# Patient Record
Sex: FEMALE | Race: WHITE | NOT HISPANIC OR LATINO | Employment: OTHER | URBAN - NONMETROPOLITAN AREA
[De-identification: names, ages, dates, MRNs, and addresses within clinical notes are randomized per-mention and may not be internally consistent; named-entity substitution may affect disease eponyms.]

---

## 2017-11-18 ENCOUNTER — HOSPITAL ENCOUNTER (INPATIENT)
Facility: HOSPITAL | Age: 62
LOS: 6 days | Discharge: NURSING, REHAB OR LONG-TERM CARE | End: 2017-11-24
Attending: EMERGENCY MEDICINE | Admitting: FAMILY MEDICINE

## 2017-11-18 ENCOUNTER — APPOINTMENT (OUTPATIENT)
Dept: GENERAL RADIOLOGY | Facility: HOSPITAL | Age: 62
End: 2017-11-18

## 2017-11-18 ENCOUNTER — ANESTHESIA (OUTPATIENT)
Dept: PERIOP | Facility: HOSPITAL | Age: 62
End: 2017-11-18

## 2017-11-18 ENCOUNTER — ANESTHESIA EVENT (OUTPATIENT)
Dept: PERIOP | Facility: HOSPITAL | Age: 62
End: 2017-11-18

## 2017-11-18 DIAGNOSIS — S72.321A CLOSED DISPLACED TRANSVERSE FRACTURE OF SHAFT OF RIGHT FEMUR, INITIAL ENCOUNTER (HCC): Primary | ICD-10-CM

## 2017-11-18 PROBLEM — S72.91XA FEMUR FRACTURE, RIGHT (HCC): Status: ACTIVE | Noted: 2017-11-18

## 2017-11-18 LAB
ALBUMIN SERPL-MCNC: 4.4 G/DL (ref 3.4–4.8)
ALBUMIN/GLOB SERPL: 1.8 G/DL (ref 1.5–2.5)
ALP SERPL-CCNC: 77 U/L (ref 35–104)
ALT SERPL W P-5'-P-CCNC: 17 U/L (ref 10–36)
ANION GAP SERPL CALCULATED.3IONS-SCNC: 6.1 MMOL/L (ref 3.6–11.2)
APTT PPP: 26.8 SECONDS (ref 23.8–36.1)
AST SERPL-CCNC: 25 U/L (ref 10–30)
BASOPHILS # BLD AUTO: 0.02 10*3/MM3 (ref 0–0.3)
BASOPHILS NFR BLD AUTO: 0.2 % (ref 0–2)
BILIRUB SERPL-MCNC: 0.5 MG/DL (ref 0.2–1.8)
BUN BLD-MCNC: 19 MG/DL (ref 7–21)
BUN/CREAT SERPL: 23.8 (ref 7–25)
CALCIUM SPEC-SCNC: 9.4 MG/DL (ref 7.7–10)
CHLORIDE SERPL-SCNC: 107 MMOL/L (ref 99–112)
CO2 SERPL-SCNC: 23.9 MMOL/L (ref 24.3–31.9)
CREAT BLD-MCNC: 0.8 MG/DL (ref 0.43–1.29)
DEPRECATED RDW RBC AUTO: 43.7 FL (ref 37–54)
EOSINOPHIL # BLD AUTO: 0.04 10*3/MM3 (ref 0–0.7)
EOSINOPHIL NFR BLD AUTO: 0.4 % (ref 0–5)
ERYTHROCYTE [DISTWIDTH] IN BLOOD BY AUTOMATED COUNT: 13.3 % (ref 11.5–14.5)
GFR SERPL CREATININE-BSD FRML MDRD: 72 ML/MIN/1.73
GLOBULIN UR ELPH-MCNC: 2.5 GM/DL
GLUCOSE BLD-MCNC: 114 MG/DL (ref 70–110)
HCT VFR BLD AUTO: 42.3 % (ref 37–47)
HGB BLD-MCNC: 13.7 G/DL (ref 12–16)
IMM GRANULOCYTES # BLD: 0.04 10*3/MM3 (ref 0–0.03)
IMM GRANULOCYTES NFR BLD: 0.4 % (ref 0–0.5)
INR PPP: 0.95 (ref 0.9–1.1)
LYMPHOCYTES # BLD AUTO: 1.19 10*3/MM3 (ref 1–3)
LYMPHOCYTES NFR BLD AUTO: 10.7 % (ref 21–51)
MCH RBC QN AUTO: 28.9 PG (ref 27–33)
MCHC RBC AUTO-ENTMCNC: 32.4 G/DL (ref 33–37)
MCV RBC AUTO: 89.2 FL (ref 80–94)
MONOCYTES # BLD AUTO: 0.46 10*3/MM3 (ref 0.1–0.9)
MONOCYTES NFR BLD AUTO: 4.1 % (ref 0–10)
NEUTROPHILS # BLD AUTO: 9.35 10*3/MM3 (ref 1.4–6.5)
NEUTROPHILS NFR BLD AUTO: 84.2 % (ref 30–70)
OSMOLALITY SERPL CALC.SUM OF ELEC: 276.9 MOSM/KG (ref 273–305)
PLATELET # BLD AUTO: 257 10*3/MM3 (ref 130–400)
PMV BLD AUTO: 11.3 FL (ref 6–10)
POTASSIUM BLD-SCNC: 4 MMOL/L (ref 3.5–5.3)
PROT SERPL-MCNC: 6.9 G/DL (ref 6–8)
PROTHROMBIN TIME: 12.8 SECONDS (ref 11–15.4)
RBC # BLD AUTO: 4.74 10*6/MM3 (ref 4.2–5.4)
SODIUM BLD-SCNC: 137 MMOL/L (ref 135–153)
WBC NRBC COR # BLD: 11.1 10*3/MM3 (ref 4.5–12.5)

## 2017-11-18 PROCEDURE — 71010 XR CHEST 1 VW: CPT | Performed by: RADIOLOGY

## 2017-11-18 PROCEDURE — 25010000002 ONDANSETRON PER 1 MG: Performed by: NURSE ANESTHETIST, CERTIFIED REGISTERED

## 2017-11-18 PROCEDURE — 85025 COMPLETE CBC W/AUTO DIFF WBC: CPT | Performed by: PHYSICIAN ASSISTANT

## 2017-11-18 PROCEDURE — C1713 ANCHOR/SCREW BN/BN,TIS/BN: HCPCS | Performed by: ORTHOPAEDIC SURGERY

## 2017-11-18 PROCEDURE — 25010000002 MIDAZOLAM PER 1 MG: Performed by: NURSE ANESTHETIST, CERTIFIED REGISTERED

## 2017-11-18 PROCEDURE — 25010000002 HYDROMORPHONE PER 4 MG: Performed by: EMERGENCY MEDICINE

## 2017-11-18 PROCEDURE — 0QS834Z REPOSITION RIGHT FEMORAL SHAFT WITH INTERNAL FIXATION DEVICE, PERCUTANEOUS APPROACH: ICD-10-PCS | Performed by: ORTHOPAEDIC SURGERY

## 2017-11-18 PROCEDURE — 93005 ELECTROCARDIOGRAM TRACING: CPT | Performed by: PHYSICIAN ASSISTANT

## 2017-11-18 PROCEDURE — 73552 X-RAY EXAM OF FEMUR 2/>: CPT

## 2017-11-18 PROCEDURE — 25010000002 HYDROMORPHONE PER 4 MG: Performed by: FAMILY MEDICINE

## 2017-11-18 PROCEDURE — 73502 X-RAY EXAM HIP UNI 2-3 VIEWS: CPT

## 2017-11-18 PROCEDURE — 76000 FLUOROSCOPY <1 HR PHYS/QHP: CPT | Performed by: RADIOLOGY

## 2017-11-18 PROCEDURE — 76000 FLUOROSCOPY <1 HR PHYS/QHP: CPT

## 2017-11-18 PROCEDURE — 99284 EMERGENCY DEPT VISIT MOD MDM: CPT

## 2017-11-18 PROCEDURE — 25010000002 HYDROMORPHONE PER 4 MG

## 2017-11-18 PROCEDURE — 25010000002 PROPOFOL 10 MG/ML EMULSION: Performed by: NURSE ANESTHETIST, CERTIFIED REGISTERED

## 2017-11-18 PROCEDURE — 87040 BLOOD CULTURE FOR BACTERIA: CPT | Performed by: ORTHOPAEDIC SURGERY

## 2017-11-18 PROCEDURE — 71010 HC CHEST PA OR AP: CPT

## 2017-11-18 PROCEDURE — 25010000002 FENTANYL CITRATE (PF) 100 MCG/2ML SOLUTION: Performed by: NURSE ANESTHETIST, CERTIFIED REGISTERED

## 2017-11-18 PROCEDURE — 73552 X-RAY EXAM OF FEMUR 2/>: CPT | Performed by: RADIOLOGY

## 2017-11-18 PROCEDURE — 85730 THROMBOPLASTIN TIME PARTIAL: CPT | Performed by: FAMILY MEDICINE

## 2017-11-18 PROCEDURE — 25010000003 CEFAZOLIN PER 500 MG: Performed by: ORTHOPAEDIC SURGERY

## 2017-11-18 PROCEDURE — 73502 X-RAY EXAM HIP UNI 2-3 VIEWS: CPT | Performed by: RADIOLOGY

## 2017-11-18 PROCEDURE — 80053 COMPREHEN METABOLIC PANEL: CPT | Performed by: PHYSICIAN ASSISTANT

## 2017-11-18 PROCEDURE — 85610 PROTHROMBIN TIME: CPT | Performed by: FAMILY MEDICINE

## 2017-11-18 PROCEDURE — 25010000003 CEFAZOLIN PER 500 MG: Performed by: NURSE ANESTHETIST, CERTIFIED REGISTERED

## 2017-11-18 PROCEDURE — 25010000002 DEXAMETHASONE PER 1 MG: Performed by: NURSE ANESTHETIST, CERTIFIED REGISTERED

## 2017-11-18 DEVICE — SCRW LK STRDRV TI 5X38M STRL: Type: IMPLANTABLE DEVICE | Status: FUNCTIONAL

## 2017-11-18 DEVICE — SCRW LK STRDRV TI 5X48MM STRL: Type: IMPLANTABLE DEVICE | Status: FUNCTIONAL

## 2017-11-18 DEVICE — BLD FEM FIX HELI TFN ADV 85MM STRL: Type: IMPLANTABLE DEVICE | Status: FUNCTIONAL

## 2017-11-18 DEVICE — IMPLANTABLE DEVICE: Type: IMPLANTABLE DEVICE | Status: FUNCTIONAL

## 2017-11-18 RX ORDER — OXYCODONE HYDROCHLORIDE AND ACETAMINOPHEN 5; 325 MG/1; MG/1
1 TABLET ORAL ONCE AS NEEDED
Status: DISCONTINUED | OUTPATIENT
Start: 2017-11-18 | End: 2017-11-19

## 2017-11-18 RX ORDER — SODIUM CHLORIDE 9 MG/ML
50 INJECTION, SOLUTION INTRAVENOUS CONTINUOUS
Status: DISCONTINUED | OUTPATIENT
Start: 2017-11-18 | End: 2017-11-24 | Stop reason: HOSPADM

## 2017-11-18 RX ORDER — HYDROMORPHONE HYDROCHLORIDE 1 MG/ML
0.5 INJECTION, SOLUTION INTRAMUSCULAR; INTRAVENOUS; SUBCUTANEOUS
Status: DISCONTINUED | OUTPATIENT
Start: 2017-11-18 | End: 2017-11-24 | Stop reason: HOSPADM

## 2017-11-18 RX ORDER — MEPERIDINE HYDROCHLORIDE 25 MG/ML
12.5 INJECTION INTRAMUSCULAR; INTRAVENOUS; SUBCUTANEOUS
Status: DISCONTINUED | OUTPATIENT
Start: 2017-11-18 | End: 2017-11-18 | Stop reason: HOSPADM

## 2017-11-18 RX ORDER — HYDROMORPHONE HCL 110MG/55ML
PATIENT CONTROLLED ANALGESIA SYRINGE INTRAVENOUS
Status: COMPLETED
Start: 2017-11-18 | End: 2017-11-18

## 2017-11-18 RX ORDER — NAPROXEN 250 MG/1
250 TABLET ORAL DAILY
Status: DISCONTINUED | OUTPATIENT
Start: 2017-11-19 | End: 2017-11-24 | Stop reason: HOSPADM

## 2017-11-18 RX ORDER — FENTANYL CITRATE 50 UG/ML
INJECTION, SOLUTION INTRAMUSCULAR; INTRAVENOUS AS NEEDED
Status: DISCONTINUED | OUTPATIENT
Start: 2017-11-18 | End: 2017-11-18 | Stop reason: SURG

## 2017-11-18 RX ORDER — LIDOCAINE HYDROCHLORIDE 20 MG/ML
INJECTION, SOLUTION EPIDURAL; INFILTRATION; INTRACAUDAL; PERINEURAL AS NEEDED
Status: DISCONTINUED | OUTPATIENT
Start: 2017-11-18 | End: 2017-11-18 | Stop reason: SURG

## 2017-11-18 RX ORDER — IPRATROPIUM BROMIDE AND ALBUTEROL SULFATE 2.5; .5 MG/3ML; MG/3ML
3 SOLUTION RESPIRATORY (INHALATION) ONCE AS NEEDED
Status: DISCONTINUED | OUTPATIENT
Start: 2017-11-18 | End: 2017-11-18 | Stop reason: HOSPADM

## 2017-11-18 RX ORDER — LISINOPRIL 10 MG/1
20 TABLET ORAL DAILY
Status: DISCONTINUED | OUTPATIENT
Start: 2017-11-19 | End: 2017-11-24 | Stop reason: HOSPADM

## 2017-11-18 RX ORDER — LISINOPRIL 20 MG/1
20 TABLET ORAL DAILY
COMMUNITY
End: 2017-11-30 | Stop reason: HOSPADM

## 2017-11-18 RX ORDER — SODIUM CHLORIDE, SODIUM LACTATE, POTASSIUM CHLORIDE, CALCIUM CHLORIDE 600; 310; 30; 20 MG/100ML; MG/100ML; MG/100ML; MG/100ML
125 INJECTION, SOLUTION INTRAVENOUS CONTINUOUS
Status: DISCONTINUED | OUTPATIENT
Start: 2017-11-18 | End: 2017-11-24 | Stop reason: HOSPADM

## 2017-11-18 RX ORDER — NAPROXEN SODIUM 220 MG
220 TABLET ORAL DAILY
COMMUNITY
End: 2022-01-01

## 2017-11-18 RX ORDER — LEVOTHYROXINE SODIUM 0.03 MG/1
25 TABLET ORAL DAILY
COMMUNITY

## 2017-11-18 RX ORDER — PROPOFOL 10 MG/ML
VIAL (ML) INTRAVENOUS AS NEEDED
Status: DISCONTINUED | OUTPATIENT
Start: 2017-11-18 | End: 2017-11-18 | Stop reason: SURG

## 2017-11-18 RX ORDER — ONDANSETRON 2 MG/ML
4 INJECTION INTRAMUSCULAR; INTRAVENOUS ONCE AS NEEDED
Status: DISCONTINUED | OUTPATIENT
Start: 2017-11-18 | End: 2017-11-18 | Stop reason: HOSPADM

## 2017-11-18 RX ORDER — MIDAZOLAM HYDROCHLORIDE 1 MG/ML
INJECTION INTRAMUSCULAR; INTRAVENOUS AS NEEDED
Status: DISCONTINUED | OUTPATIENT
Start: 2017-11-18 | End: 2017-11-18 | Stop reason: SURG

## 2017-11-18 RX ORDER — SODIUM CHLORIDE 9 MG/ML
125 INJECTION, SOLUTION INTRAVENOUS CONTINUOUS
Status: DISCONTINUED | OUTPATIENT
Start: 2017-11-18 | End: 2017-11-24 | Stop reason: HOSPADM

## 2017-11-18 RX ORDER — ACETAMINOPHEN 325 MG/1
325 TABLET ORAL EVERY 4 HOURS PRN
Status: DISCONTINUED | OUTPATIENT
Start: 2017-11-18 | End: 2017-11-24 | Stop reason: HOSPADM

## 2017-11-18 RX ORDER — SODIUM CHLORIDE 0.9 % (FLUSH) 0.9 %
1-10 SYRINGE (ML) INJECTION AS NEEDED
Status: DISCONTINUED | OUTPATIENT
Start: 2017-11-18 | End: 2017-11-18 | Stop reason: HOSPADM

## 2017-11-18 RX ORDER — DEXAMETHASONE SODIUM PHOSPHATE 4 MG/ML
INJECTION, SOLUTION INTRA-ARTICULAR; INTRALESIONAL; INTRAMUSCULAR; INTRAVENOUS; SOFT TISSUE AS NEEDED
Status: DISCONTINUED | OUTPATIENT
Start: 2017-11-18 | End: 2017-11-18 | Stop reason: SURG

## 2017-11-18 RX ORDER — LEVOTHYROXINE SODIUM 0.03 MG/1
25 TABLET ORAL DAILY
Status: DISCONTINUED | OUTPATIENT
Start: 2017-11-19 | End: 2017-11-24 | Stop reason: HOSPADM

## 2017-11-18 RX ORDER — FENTANYL CITRATE 50 UG/ML
50 INJECTION, SOLUTION INTRAMUSCULAR; INTRAVENOUS
Status: DISCONTINUED | OUTPATIENT
Start: 2017-11-18 | End: 2017-11-18 | Stop reason: HOSPADM

## 2017-11-18 RX ORDER — ACETAMINOPHEN 325 MG/1
650 TABLET ORAL EVERY 4 HOURS PRN
Status: DISCONTINUED | OUTPATIENT
Start: 2017-11-18 | End: 2017-11-24 | Stop reason: HOSPADM

## 2017-11-18 RX ORDER — ONDANSETRON 2 MG/ML
INJECTION INTRAMUSCULAR; INTRAVENOUS AS NEEDED
Status: DISCONTINUED | OUTPATIENT
Start: 2017-11-18 | End: 2017-11-18 | Stop reason: SURG

## 2017-11-18 RX ADMIN — ONDANSETRON 4 MG: 2 INJECTION, SOLUTION INTRAMUSCULAR; INTRAVENOUS at 16:24

## 2017-11-18 RX ADMIN — SODIUM CHLORIDE 50 ML/HR: 9 INJECTION, SOLUTION INTRAVENOUS at 14:14

## 2017-11-18 RX ADMIN — FENTANYL CITRATE 50 MCG: 50 INJECTION INTRAMUSCULAR; INTRAVENOUS at 15:20

## 2017-11-18 RX ADMIN — HYDROMORPHONE HYDROCHLORIDE 0.5 MG: 1 INJECTION, SOLUTION INTRAMUSCULAR; INTRAVENOUS; SUBCUTANEOUS at 14:14

## 2017-11-18 RX ADMIN — SODIUM CHLORIDE 125 ML/HR: 9 INJECTION, SOLUTION INTRAVENOUS at 11:38

## 2017-11-18 RX ADMIN — HYDROMORPHONE HYDROCHLORIDE 0.5 MG: 1 INJECTION, SOLUTION INTRAMUSCULAR; INTRAVENOUS; SUBCUTANEOUS at 23:04

## 2017-11-18 RX ADMIN — DEXAMETHASONE SODIUM PHOSPHATE 4 MG: 4 INJECTION, SOLUTION INTRAMUSCULAR; INTRAVENOUS at 16:24

## 2017-11-18 RX ADMIN — CEFAZOLIN SODIUM 2 G: 2 SOLUTION INTRAVENOUS at 14:57

## 2017-11-18 RX ADMIN — FENTANYL CITRATE 50 MCG: 50 INJECTION INTRAMUSCULAR; INTRAVENOUS at 16:54

## 2017-11-18 RX ADMIN — HYDROMORPHONE HYDROCHLORIDE 1 MG: 2 INJECTION INTRAMUSCULAR; INTRAVENOUS; SUBCUTANEOUS at 10:11

## 2017-11-18 RX ADMIN — MIDAZOLAM HYDROCHLORIDE 2 MG: 1 INJECTION, SOLUTION INTRAMUSCULAR; INTRAVENOUS at 14:57

## 2017-11-18 RX ADMIN — LIDOCAINE HYDROCHLORIDE 3 ML: 20 INJECTION, SOLUTION EPIDURAL; INFILTRATION; INTRACAUDAL; PERINEURAL at 14:58

## 2017-11-18 RX ADMIN — PROPOFOL 150 MG: 10 INJECTION, EMULSION INTRAVENOUS at 14:59

## 2017-11-18 RX ADMIN — FENTANYL CITRATE 50 MCG: 50 INJECTION INTRAMUSCULAR; INTRAVENOUS at 16:17

## 2017-11-18 RX ADMIN — CEFAZOLIN SODIUM 2 G: 2 SOLUTION INTRAVENOUS at 23:04

## 2017-11-18 RX ADMIN — FENTANYL CITRATE 50 MCG: 50 INJECTION INTRAMUSCULAR; INTRAVENOUS at 15:23

## 2017-11-18 RX ADMIN — HYDROMORPHONE HYDROCHLORIDE 1 MG: 1 INJECTION, SOLUTION INTRAMUSCULAR; INTRAVENOUS; SUBCUTANEOUS at 11:40

## 2017-11-18 RX ADMIN — SODIUM CHLORIDE, POTASSIUM CHLORIDE, SODIUM LACTATE AND CALCIUM CHLORIDE: 600; 310; 30; 20 INJECTION, SOLUTION INTRAVENOUS at 14:48

## 2017-11-18 NOTE — PLAN OF CARE
Problem: Orthopaedic Fracture (Adult)  Goal: Signs and Symptoms of Listed Potential Problems Will be Absent or Manageable (Orthopaedic Fracture)  Outcome: Ongoing (interventions implemented as appropriate)    Problem: Pain, Chronic (Adult)  Goal: Identify Related Risk Factors and Signs and Symptoms  Outcome: Ongoing (interventions implemented as appropriate)  Goal: Acceptable Pain Control/Comfort Level  Outcome: Ongoing (interventions implemented as appropriate)    Problem: Activity Intolerance (Adult)  Goal: Identify Related Risk Factors and Signs and Symptoms  Outcome: Ongoing (interventions implemented as appropriate)  Goal: Activity Tolerance  Outcome: Ongoing (interventions implemented as appropriate)  Goal: Effective Energy Conservation Techniques  Outcome: Ongoing (interventions implemented as appropriate)

## 2017-11-18 NOTE — ANESTHESIA POSTPROCEDURE EVALUATION
Patient: Gilda Galdamez    Procedure Summary     Date Anesthesia Start Anesthesia Stop Room / Location    11/18/17 8154 1236 [facility] OR 04 / [facility]       Procedure Diagnosis Surgeon Provider    FEMUR OPEN REDUCTION INTERNAL FIXATION (Right Thigh) Closed displaced transverse fracture of shaft of right femur, initial encounter  (Closed displaced transverse fracture of shaft of right femur, initial encounter [S72.321A]) MD Daniel Lyons MD          Anesthesia Type: general  Last vitals  BP   133/63 (11/18/17 1735)   Temp   98.6 °F (37 °C) (11/18/17 1721)   Pulse   79 (11/18/17 1735)   Resp   12 (11/18/17 1735)     SpO2   95 % (11/18/17 1735)     Post Anesthesia Care and Evaluation    Patient location during evaluation: PACU  Patient participation: complete - patient participated  Level of consciousness: awake and alert  Pain score: 1  Pain management: adequate  Airway patency: patent  Anesthetic complications: No anesthetic complications  PONV Status: controlled  Cardiovascular status: acceptable  Respiratory status: acceptable  Hydration status: acceptable

## 2017-11-18 NOTE — ANESTHESIA PREPROCEDURE EVALUATION
Anesthesia Evaluation     Patient summary reviewed and Nursing notes reviewed   no history of anesthetic complications:  NPO Solid Status: > 8 hours  NPO Liquid Status: > 8 hours     Airway   Mallampati: III  TM distance: >3 FB  Neck ROM: full  no difficulty expected  Dental - normal exam   (+) lower dentures and upper dentures    Pulmonary - normal exam   (+) a smoker Former, COPD mild,   Cardiovascular - normal exam  Exercise tolerance: good (4-7 METS)    NYHA Classification: II    (+) hypertension,       Neuro/Psych- negative ROS  GI/Hepatic/Renal/Endo    (+)  hypothyroidism,     Musculoskeletal (-) negative ROS    Abdominal  - normal exam    Bowel sounds: normal.   Substance History - negative use     OB/GYN negative ob/gyn ROS         Other - negative ROS                                       Anesthesia Plan    ASA 2     general     intravenous induction   Anesthetic plan and risks discussed with patient.    Plan discussed with CRNA.

## 2017-11-18 NOTE — PROGRESS NOTES
Discharge Planning Assessment  YADIEL Cox     Patient Name: Gilda Galdamez  MRN: [id number]  Today's Date: 11/18/2017    Admit Date: 11/18/2017          Discharge Needs Assessment       11/18/17 1756    Living Environment    Lives With spouse    Living Arrangements house    Transportation Available family or friend will provide    Discharge Needs Assessment    Concerns To Be Addressed discharge planning concerns    Readmission Within The Last 30 Days no previous admission in last 30 days    Equipment Currently Used at Home none    Discharge Planning Comments SOCIAL SERVICE CONSULT PLACED RT DISCHARGE PLANNING. PT IS BEING ADMITTED TO [facility] TO DR FALCON FOR RIGHT FEMUR FRACTURE.  VS: 97.6, 78, 18, 183/75, 97%  CO: FALL AT WORK  ER TX: DILAUDID IV  XRAY RIGHT FEMUR: DISPLACED AND OVERLAPPED MID FEMUR FX   HX: HTN  FLOOR ORDERS: ORTHO CONSULT, PERDUE'S TRACTION. PT TO SURGERY FOR ORIF ON RIGHT FEMUR, BMP/CBC IN AM, DIET AS TOLERATED, ANCEF IV Q 8 HRS, LOVENOX SQ QD, NEURO CHECKS Q 4 HRS,  ML/HR              Discharge Plan             Discharge Placement                     Demographic Summary       11/18/17 1755    Referral Information    Admission Type inpatient    Arrived From home or self-care    Referral Source admission list;emergency department    Reason For Consult discharge planning    Record Reviewed history and physical;medical record;patient profile    Primary Care Physician Information    Name DR FALCON            Functional Status       11/18/17 1756    Functional Status Current    Ambulation 3-->assistive equipment and person    Transferring 3-->assistive equipment and person    Toileting 3-->assistive equipment and person    Bathing 3-->assistive equipment and person    Dressing 3-->assistive equipment and person    Eating 0-->independent    Communication 0-->understands/communicates without difficulty    Functional Status Prior    Ambulation 0-->independent    Transferring 0-->independent    Toileting  0-->independent    Bathing 0-->independent    Dressing 0-->independent    Eating 0-->independent    Communication 0-->understands/communicates without difficulty            Psychosocial                 Abuse/Neglect                 Legal       11/18/17 9529    Legal    Legal Comments PT DOES NOT HAVE POA, LIVING WILL OR ADVANCED DIRECTIVES.            Substance Abuse                 Patient Forms               Aurea Osorio, RN

## 2017-11-18 NOTE — ANESTHESIA PROCEDURE NOTES
Airway  Urgency: elective    Date/Time: 11/18/2017 3:00 PM  End Time:11/18/2017 3:00 PM    General Information and Staff    Patient location during procedure: OR  Anesthesiologist: HOWARD BURTON  CRNA: CHARITO PALACIO    Indications and Patient Condition  Indications for airway management: airway protection    Preoxygenated: yes  MILS maintained throughout  Mask difficulty assessment: 0 - not attempted    Final Airway Details  Final airway type: supraglottic airway      Successful airway: unique  Size 3  Airway Seal Pressure (cm H2O): 20    Number of attempts at approach: 1    Additional Comments  Atraumatic

## 2017-11-18 NOTE — ED NOTES
Pt waiting on transport to floor. Fall precautions maintained.     Scarlet Steward RN  11/18/17 9706

## 2017-11-18 NOTE — OP NOTE
Preoperative diagnostic: Displaced fracture midshaft right femur closed.    Postoperative diagnostic: Same    Procedure: Open reduction and internal fixation with long femoral locking nail.    Patient under general anesthesia lying supine on the fracture table, I first proceeded with a closed reduction under C-arm guidance and then I placed her right foot in traction to maintain the reduction.  Prepping and draping of her right hip and right thigh as usual.  A 6 cm incision above the greater trochanter.  Opening of the fascia ade.  Insertion of a cannulated curved awl in the piriformis fossa that is pushed into the proximal femur under C-arm guidance.  Placement of a long guide pin through that awl that I anchor in the distal femur.  Progressive reaming of the femur up to 12.5 mm.  Insertion of a long femoral locking nail that measured 40 cm.  Diameter 11 mm.  I locked the nail proximally with an helicoidal blade of 85 mm.  Distally tthe nail his locked with 2 bicortical screws.  The proximal wound is closed with Vicryl No. 1 and staples on the skin. The other incisions for locking are closed with staples only.  Sterile dressing with Adaptics, 4 x 4, surgical pad and tape.  Estimated blood loss 150 cc.  Patient is transfer on her back in her bed  in good stable condition.  She tolerated the procedure well.  We saved images with the C-arm.

## 2017-11-19 LAB
ANION GAP SERPL CALCULATED.3IONS-SCNC: 4.8 MMOL/L (ref 3.6–11.2)
BASOPHILS # BLD AUTO: 0.01 10*3/MM3 (ref 0–0.3)
BASOPHILS NFR BLD AUTO: 0.1 % (ref 0–2)
BUN BLD-MCNC: 14 MG/DL (ref 7–21)
BUN/CREAT SERPL: 17.1 (ref 7–25)
CALCIUM SPEC-SCNC: 8.5 MG/DL (ref 7.7–10)
CHLORIDE SERPL-SCNC: 110 MMOL/L (ref 99–112)
CO2 SERPL-SCNC: 23.2 MMOL/L (ref 24.3–31.9)
CREAT BLD-MCNC: 0.82 MG/DL (ref 0.43–1.29)
DEPRECATED RDW RBC AUTO: 43.2 FL (ref 37–54)
EOSINOPHIL # BLD AUTO: 0 10*3/MM3 (ref 0–0.7)
EOSINOPHIL NFR BLD AUTO: 0 % (ref 0–5)
ERYTHROCYTE [DISTWIDTH] IN BLOOD BY AUTOMATED COUNT: 13.3 % (ref 11.5–14.5)
GFR SERPL CREATININE-BSD FRML MDRD: 70 ML/MIN/1.73
GLUCOSE BLD-MCNC: 144 MG/DL (ref 70–110)
HCT VFR BLD AUTO: 36.8 % (ref 37–47)
HGB BLD-MCNC: 11.7 G/DL (ref 12–16)
IMM GRANULOCYTES # BLD: 0.01 10*3/MM3 (ref 0–0.03)
IMM GRANULOCYTES NFR BLD: 0.1 % (ref 0–0.5)
LYMPHOCYTES # BLD AUTO: 0.97 10*3/MM3 (ref 1–3)
LYMPHOCYTES NFR BLD AUTO: 10.9 % (ref 21–51)
MCH RBC QN AUTO: 28.9 PG (ref 27–33)
MCHC RBC AUTO-ENTMCNC: 31.8 G/DL (ref 33–37)
MCV RBC AUTO: 90.9 FL (ref 80–94)
MONOCYTES # BLD AUTO: 0.65 10*3/MM3 (ref 0.1–0.9)
MONOCYTES NFR BLD AUTO: 7.3 % (ref 0–10)
NEUTROPHILS # BLD AUTO: 7.26 10*3/MM3 (ref 1.4–6.5)
NEUTROPHILS NFR BLD AUTO: 81.6 % (ref 30–70)
OSMOLALITY SERPL CALC.SUM OF ELEC: 278.7 MOSM/KG (ref 273–305)
PLATELET # BLD AUTO: 247 10*3/MM3 (ref 130–400)
PMV BLD AUTO: 11.3 FL (ref 6–10)
POTASSIUM BLD-SCNC: 4.5 MMOL/L (ref 3.5–5.3)
RBC # BLD AUTO: 4.05 10*6/MM3 (ref 4.2–5.4)
SODIUM BLD-SCNC: 138 MMOL/L (ref 135–153)
WBC NRBC COR # BLD: 8.9 10*3/MM3 (ref 4.5–12.5)

## 2017-11-19 PROCEDURE — 25010000003 CEFAZOLIN PER 500 MG: Performed by: ORTHOPAEDIC SURGERY

## 2017-11-19 PROCEDURE — 80048 BASIC METABOLIC PNL TOTAL CA: CPT | Performed by: ORTHOPAEDIC SURGERY

## 2017-11-19 PROCEDURE — 25010000002 ENOXAPARIN PER 10 MG: Performed by: ORTHOPAEDIC SURGERY

## 2017-11-19 PROCEDURE — 85025 COMPLETE CBC W/AUTO DIFF WBC: CPT | Performed by: ORTHOPAEDIC SURGERY

## 2017-11-19 PROCEDURE — 25010000002 HYDROMORPHONE PER 4 MG: Performed by: FAMILY MEDICINE

## 2017-11-19 RX ORDER — OXYCODONE HYDROCHLORIDE AND ACETAMINOPHEN 5; 325 MG/1; MG/1
1 TABLET ORAL EVERY 6 HOURS PRN
Status: DISCONTINUED | OUTPATIENT
Start: 2017-11-19 | End: 2017-11-24 | Stop reason: HOSPADM

## 2017-11-19 RX ADMIN — CEFAZOLIN SODIUM 2 G: 2 SOLUTION INTRAVENOUS at 06:20

## 2017-11-19 RX ADMIN — SODIUM CHLORIDE 50 ML/HR: 9 INJECTION, SOLUTION INTRAVENOUS at 21:01

## 2017-11-19 RX ADMIN — OXYCODONE HYDROCHLORIDE AND ACETAMINOPHEN 1 TABLET: 5; 325 TABLET ORAL at 09:36

## 2017-11-19 RX ADMIN — LISINOPRIL 20 MG: 10 TABLET ORAL at 09:36

## 2017-11-19 RX ADMIN — CEFAZOLIN SODIUM 2 G: 2 SOLUTION INTRAVENOUS at 15:01

## 2017-11-19 RX ADMIN — NAPROXEN 250 MG: 250 TABLET ORAL at 09:37

## 2017-11-19 RX ADMIN — ENOXAPARIN SODIUM 40 MG: 40 INJECTION SUBCUTANEOUS at 09:36

## 2017-11-19 RX ADMIN — HYDROMORPHONE HYDROCHLORIDE 0.5 MG: 1 INJECTION, SOLUTION INTRAMUSCULAR; INTRAVENOUS; SUBCUTANEOUS at 03:41

## 2017-11-19 RX ADMIN — LEVOTHYROXINE SODIUM 25 MCG: 75 TABLET ORAL at 09:37

## 2017-11-19 RX ADMIN — OXYCODONE HYDROCHLORIDE AND ACETAMINOPHEN 1 TABLET: 5; 325 TABLET ORAL at 16:53

## 2017-11-19 NOTE — PLAN OF CARE
Problem: Orthopaedic Fracture (Adult)  Goal: Signs and Symptoms of Listed Potential Problems Will be Absent or Manageable (Orthopaedic Fracture)  Outcome: Ongoing (interventions implemented as appropriate)    Problem: Pain, Chronic (Adult)  Goal: Identify Related Risk Factors and Signs and Symptoms  Outcome: Ongoing (interventions implemented as appropriate)  Goal: Acceptable Pain Control/Comfort Level  Outcome: Ongoing (interventions implemented as appropriate)    Problem: Activity Intolerance (Adult)  Goal: Identify Related Risk Factors and Signs and Symptoms  Outcome: Ongoing (interventions implemented as appropriate)  Goal: Activity Tolerance  Outcome: Ongoing (interventions implemented as appropriate)  Goal: Effective Energy Conservation Techniques  Outcome: Ongoing (interventions implemented as appropriate)    Problem: Patient Care Overview (Adult)  Goal: Plan of Care Review  Outcome: Ongoing (interventions implemented as appropriate)    11/19/17 0019 11/19/17 0936   Patient Care Overview   Progress no change --    Coping/Psychosocial Response Interventions   Plan Of Care Reviewed With --  patient

## 2017-11-19 NOTE — PROGRESS NOTES
LOS: 1 day     Chief Complaint:  Right femur fracture    Subjective  lying in bed NAD.     Interval History: POD 1 right femur ORIF due to work related fracture.  Doing well this AM, pain controlled.     Patient Complaints: none  History taken from: patient      Objective POD 1 right femur ORIF, dressing CDI, neuro intact.     Vital Signs  Temp:  [97.6 °F (36.4 °C)-99.5 °F (37.5 °C)] 98.6 °F (37 °C)  Heart Rate:  [61-87] 80  Resp:  [9-24] 16  BP: (130-183)/(53-90) 138/68    Labs & Rads  Lab Results (last 72 hours)     Procedure Component Value Units Date/Time    CBC & Differential [[id number]] Collected:  11/18/17 1125    Specimen:  Blood Updated:  11/18/17 1143    Narrative:       The following orders were created for panel order CBC & Differential.  Procedure                               Abnormality         Status                     ---------                               -----------         ------                     CBC Auto Differential[[id number]]        Abnormal            Final result                 Please view results for these tests on the individual orders.    CBC Auto Differential [[id number]]  (Abnormal) Collected:  11/18/17 1125    Specimen:  Blood from Arm, Right Updated:  11/18/17 1143     WBC 11.10 10*3/mm3      RBC 4.74 10*6/mm3      Hemoglobin 13.7 g/dL      Hematocrit 42.3 %      MCV 89.2 fL      MCH 28.9 pg      MCHC 32.4 (L) g/dL      RDW 13.3 %      RDW-SD 43.7 fl      MPV 11.3 (H) fL      Platelets 257 10*3/mm3      Neutrophil % 84.2 (H) %      Lymphocyte % 10.7 (L) %      Monocyte % 4.1 %      Eosinophil % 0.4 %      Basophil % 0.2 %      Immature Grans % 0.4 %      Neutrophils, Absolute 9.35 (H) 10*3/mm3      Lymphocytes, Absolute 1.19 10*3/mm3      Monocytes, Absolute 0.46 10*3/mm3      Eosinophils, Absolute 0.04 10*3/mm3      Basophils, Absolute 0.02 10*3/mm3      Immature Grans, Absolute 0.04 (H) 10*3/mm3     Comprehensive Metabolic Panel [[id number]]  (Abnormal) Collected:  11/18/17  1125    Specimen:  Blood from Arm, Right Updated:  11/18/17 1210     Glucose 114 (H) mg/dL      BUN 19 mg/dL      Creatinine 0.80 mg/dL      Sodium 137 mmol/L      Potassium 4.0 mmol/L      Chloride 107 mmol/L      CO2 23.9 (L) mmol/L      Calcium 9.4 mg/dL      Total Protein 6.9 g/dL      Albumin 4.40 g/dL      ALT (SGPT) 17 U/L      AST (SGOT) 25 U/L      Alkaline Phosphatase 77 U/L       Note New Reference Ranges        Total Bilirubin 0.5 mg/dL      eGFR Non African Amer 72 mL/min/1.73      Globulin 2.5 gm/dL      A/G Ratio 1.8 g/dL      BUN/Creatinine Ratio 23.8     Anion Gap 6.1 mmol/L     Osmolality, Calculated [[id number]]  (Normal) Collected:  11/18/17 1125    Specimen:  Blood from Arm, Right Updated:  11/18/17 1210     Osmolality Calc 276.9 mOsm/kg     aPTT [[id number]]  (Normal) Collected:  11/18/17 1328    Specimen:  Blood Updated:  11/18/17 1352     PTT 26.8 seconds       Note new Reference Range       Narrative:       PTT Heparin Therapeutic Range:  59 - 95 seconds    Protime-INR [[id number]]  (Normal) Collected:  11/18/17 1328    Specimen:  Blood Updated:  11/18/17 1352     Protime 12.8 Seconds       Note new Reference Range        INR 0.95    Narrative:       Suggested INR therapeutic range for stable oral anticoagulant therapy:    Low Intensity therapy:   1.5-2.0  Moderate Intensity therapy:   2.0-3.0  High Intensity therapy:   2.5-4.0    Blood Culture - Blood, [[id number]] Collected:  11/18/17 1910    Specimen:  Blood from Arm, Left Updated:  11/18/17 1936    CBC & Differential [[id number]] Collected:  11/19/17 0052    Specimen:  Blood Updated:  11/19/17 0141    Narrative:       The following orders were created for panel order CBC & Differential.  Procedure                               Abnormality         Status                     ---------                               -----------         ------                     CBC Auto Differential[[id number]]        Abnormal            Final result                  Please view results for these tests on the individual orders.    CBC Auto Differential [[id number]]  (Abnormal) Collected:  11/19/17 0052    Specimen:  Blood Updated:  11/19/17 0141     WBC 8.90 10*3/mm3      RBC 4.05 (L) 10*6/mm3      Hemoglobin 11.7 (L) g/dL      Hematocrit 36.8 (L) %      MCV 90.9 fL      MCH 28.9 pg      MCHC 31.8 (L) g/dL      RDW 13.3 %      RDW-SD 43.2 fl      MPV 11.3 (H) fL      Platelets 247 10*3/mm3      Neutrophil % 81.6 (H) %      Lymphocyte % 10.9 (L) %      Monocyte % 7.3 %      Eosinophil % 0.0 %      Basophil % 0.1 %      Immature Grans % 0.1 %      Neutrophils, Absolute 7.26 (H) 10*3/mm3      Lymphocytes, Absolute 0.97 (L) 10*3/mm3      Monocytes, Absolute 0.65 10*3/mm3      Eosinophils, Absolute 0.00 10*3/mm3      Basophils, Absolute 0.01 10*3/mm3      Immature Grans, Absolute 0.01 10*3/mm3     Basic Metabolic Panel [[id number]]  (Abnormal) Collected:  11/19/17 0052    Specimen:  Blood Updated:  11/19/17 0155     Glucose 144 (H) mg/dL      BUN 14 mg/dL      Creatinine 0.82 mg/dL      Sodium 138 mmol/L      Potassium 4.5 mmol/L      Chloride 110 mmol/L      CO2 23.2 (L) mmol/L      Calcium 8.5 mg/dL      eGFR Non African Amer 70 mL/min/1.73      BUN/Creatinine Ratio 17.1     Anion Gap 4.8 mmol/L     Narrative:       GFR Normal >60  Chronic Kidney Disease <60  Kidney Failure <15    Osmolality, Calculated [[id number]]  (Normal) Collected:  11/19/17 0052    Specimen:  Blood Updated:  11/19/17 0155     Osmolality Calc 278.7 mOsm/kg         Imaging Results (last 72 hours)     Procedure Component Value Units Date/Time    XR Hip With or Without Pelvis 2 - 3 View Right [[id number]] Collected:  11/18/17 1109     Updated:  11/18/17 1111    Narrative:       EXAMINATION: XR HIP W OR WO PELVIS 2-3 VIEW RIGHT-      TECHNIQUE: XR HIP W OR WO PELVIS 2-3 VIEW RIGHT-        INDICATION: fall      COMPARISON: NONE     FINDINGS:          BONES: No acute fracture. No blastic or lytic lesions.           JOINT: No dislocation.          SOFT TISSUES:  No soft tissue mass.       Impression:       No acute or destructive bony abnormality.     COMMUNICATION: Per this written report.     This report was finalized on 11/18/2017 11:09 AM by Dr. Rufus Washington MD.       XR Femur 2 View Right [[id number]] Collected:  11/18/17 1109     Updated:  11/18/17 1112    Narrative:       EXAMINATION: XR FEMUR 2 VW RIGHT-      TECHNIQUE: XR FEMUR 2 VW RIGHT-        INDICATION: fall      COMPARISON: NONE     FINDINGS:          BONES: Moderately displaced and overlapped mid femur fracture.          JOINT: No dislocation.          SOFT TISSUES:  No soft tissue mass.       Impression:       Moderately displaced and overlapped mid femur fracture.     COMMUNICATION: Per this written report.     This report was finalized on 11/18/2017 11:10 AM by Dr. Rufus Washington MD.       XR Chest 1 View [[id number]] Updated:  11/18/17 1249    FL Surgery Fluoro [[id number]] Updated:  11/18/17 1654          Physical Exam:   Physical Exam   Constitutional: She is oriented to person, place, and time. She appears well-developed and well-nourished. No distress.   HENT:   Head: Normocephalic.   Pulmonary/Chest: Effort normal.   Musculoskeletal:   Right femur dressing CDI, + dorsi/planter flexion, +pp.    Neurological: She is alert and oriented to person, place, and time.   Skin: Skin is warm and dry. She is not diaphoretic.   Psychiatric: She has a normal mood and affect. Her behavior is normal.   Vitals reviewed.       Results Review:     I reviewed the patient's new clinical results.      Assessment/Plan     Principal Problem:    Closed displaced transverse fracture of shaft of right femur  Active Problems:    Femur fracture, right s/p ORIF, begin dressing changes on POD 2, begin P.T.           Plan for disposition:follow up ortho in 2 weeks.     GUERLINE Poe  11/19/17  7:32 AM

## 2017-11-19 NOTE — PLAN OF CARE
Problem: Orthopaedic Fracture (Adult)  Goal: Signs and Symptoms of Listed Potential Problems Will be Absent or Manageable (Orthopaedic Fracture)    11/18/17 7159   Orthopaedic Fracture   Problems Assessed (Orthopaedic Fracture) pain   Problems Present (Orthopaedic Fracture) pain

## 2017-11-19 NOTE — CONSULTS
Referring Provider: GUERLINE Poe  Reason for Consultation: right femur fracture    Patient Care Team:  Sukhdev Ramirez MD as PCP - General (Family Medicine)    Chief complaint right femur fracture    Subjective . 62 year old female s/p fall at work, seen in ED for a right femur fracture.     History of present illness: History of Present Illness 62 year old female s/p work related injury.  Patient states she tripped and fell due to a chair, injuring the right leg.      Review of Systems  Review of Systems   Constitutional: Negative for chills, diaphoresis and fever.   HENT: Negative.    Eyes: Negative.    Respiratory: Negative.    Cardiovascular: Negative.    Endocrine: Negative.    Genitourinary: Negative for difficulty urinating and dysuria.   Musculoskeletal: Negative for neck pain and neck stiffness.        Endorses right leg pain.   Skin: Negative.    Allergic/Immunologic: Negative.    Neurological: Negative.    Hematological: Negative.    Psychiatric/Behavioral: Negative.         History  Past Medical History:   Diagnosis Date   • Disease of thyroid gland    • Hypertension  COPD    , Past Surgical History:   Procedure Laterality Date   • TONSILLECTOMY      Family history  Mother alive 80s no known medical disease  Father  Cancer of unknown type.   Social History:    Substance Use Topics   • Smoking status: Former Smoker 2ppd since [place],  Uses Vapor x 4 years.    • Smokeless tobacco: Never Used   • Alcohol use No   , Prescriptions Prior to Admission   Medication Sig Dispense Refill Last Dose   • levothyroxine (SYNTHROID, LEVOTHROID) 25 MCG tablet Take 25 mcg by mouth Daily.   2017 at AM   • lisinopril (PRINIVIL,ZESTRIL) 20 MG tablet Take 20 mg by mouth Daily.   2017 at AM   • naproxen sodium (ALEVE) 220 MG tablet Take 220 mg by mouth Daily.   2017 at AM   , Scheduled Meds:    ceFAZolin 2 g Intravenous Q8H   enoxaparin 40 mg Subcutaneous Daily   levothyroxine 25 mcg  Oral Daily   lisinopril 20 mg Oral Daily   naproxen 250 mg Oral Daily   , Continuous Infusions:    lactated ringers 125 mL/hr    sodium chloride 125 mL/hr Last Rate: 125 mL/hr (11/18/17 1138)   sodium chloride 50 mL/hr Last Rate: 50 mL/hr (11/18/17 1414)   , PRN Meds:  •  acetaminophen  •  acetaminophen  •  HYDROmorphone  •  oxyCODONE-acetaminophen and Allergies:  Review of patient's allergies indicates no known allergies.    Objective      Vital Signs   Temp:  [97.6 °F (36.4 °C)-99.5 °F (37.5 °C)] 97.9 °F (36.6 °C)  Heart Rate:  [61-87] 67  Resp:  [9-24] 18  BP: (130-183)/(53-90) 161/57    Physical Exam:   Physical Exam   Constitutional: She is oriented to person, place, and time. She appears well-developed and well-nourished. No distress.   HENT:   Head: Normocephalic.   Eyes: Pupils are equal, round, and reactive to light.   Neck: Normal range of motion.   Cardiovascular: Normal rate.    Pulmonary/Chest: Effort normal and breath sounds normal.   Abdominal: Soft. Bowel sounds are normal.   Musculoskeletal: She exhibits tenderness.   Right femur pain with motion.  Dressing CDI currently.   Neurological: She is alert and oriented to person, place, and time.   Skin: Skin is warm and dry. She is not diaphoretic.   Psychiatric: She has a normal mood and affect. Her behavior is normal.   Vitals reviewed.      Results Review:   I reviewed the patient's new clinical results.  X ray:  Displaced transverse right femur shaft fracture.     Assessment/Plan   Patient previously seen and examined per Dr Cifuentes, recommendation for traction and surgical intervention for right femur ORIF scheduled for 11/18/17.     Principal Problem:    Closed displaced transverse fracture of shaft of right femur  Active Problems:    Femur fracture, right        Mary Lou Lopez, GUERLINE  11/19/17  7:43 AM

## 2017-11-19 NOTE — H&P
Patient Identification:  Name:  Gilda Galdamez  Age:  62 y.o.  Sex:  female  :  1955  MRN:  [id number]   Visit Number:  [id number]  Primary Care Physician:  Sukhdev Ramirez MD       Chief complaint: Right femur fracture    History of presenting illness:  62 y.o. female was at work at [employer] and [employer] and fractured her right femur spontaneously.  She is brought to emergency room x-rayed and then sent to the OR where she had disrepair.  ---------------------------------------------------------------------------------------------------------------------   Review Of Systems:  See HPI  ---------------------------------------------------------------------------------------------------------------------   Past Medical History:   Diagnosis Date   • Disease of thyroid gland    • Hypertension      Past Surgical History:   Procedure Laterality Date   • TONSILLECTOMY       History reviewed. No pertinent family history.  Social History     Social History   • Marital status:      Spouse name: N/A   • Number of children: N/A   • Years of education: N/A     Social History Main Topics   • Smoking status: Former Smoker   • Smokeless tobacco: Never Used   • Alcohol use No   • Drug use: No   • Sexual activity: Not Asked     Other Topics Concern   • None     Social History Narrative   • None     ---------------------------------------------------------------------------------------------------------------------   Allergies:  Review of patient's allergies indicates no known allergies.  ---------------------------------------------------------------------------------------------------------------------   Prior to Admission Medications     Prescriptions Last Dose Informant Patient Reported? Taking?    levothyroxine (SYNTHROID, LEVOTHROID) 25 MCG tablet 2017 Self Yes Yes    Take 25 mcg by mouth Daily.    lisinopril (PRINIVIL,ZESTRIL) 20 MG tablet 2017 Self Yes Yes    Take 20 mg by mouth Daily.     naproxen sodium (ALEVE) 220 MG tablet 11/18/2017 Self Yes Yes    Take 220 mg by mouth Daily.        Hospital Scheduled Meds:    ceFAZolin 2 g Intravenous Q8H   enoxaparin 40 mg Subcutaneous Daily   levothyroxine 25 mcg Oral Daily   lisinopril 20 mg Oral Daily   naproxen 250 mg Oral Daily       lactated ringers 125 mL/hr    sodium chloride 125 mL/hr Last Rate: 125 mL/hr (11/18/17 1138)   sodium chloride 50 mL/hr Last Rate: 50 mL/hr (11/18/17 1414)     ---------------------------------------------------------------------------------------------------------------------   Vital Signs:  Temp:  [97.6 °F (36.4 °C)-99.5 °F (37.5 °C)] 97.9 °F (36.6 °C)  Heart Rate:  [61-87] 67  Resp:  [9-24] 18  BP: (130-183)/(53-90) 161/57  Last 3 weights    11/18/17  0921 11/18/17  1243   Weight: 140 lb (63.5 kg) 164 lb 7 oz (74.6 kg)     Body mass index is 25.75 kg/(m^2).  ---------------------------------------------------------------------------------------------------------------------   Physical Exam:  Constitutional: Patient alert oriented no acute distress    HENT:  Pupils equal reactive extra movements intact no jaundice neck supple   Eyes:    Neck:      Cardiovascular:  S1 and S2 without murmur  Pulmonary/Chest:  Lungs clear  Abdominal:  Soft nontender   Musculoskeletal:  Right femur dressed good pulses    Neurological:   Skin:    Psychiatric:      ---------------------------------------------------------------------------------------------------------------------      ---------------------------------------------------------------------------------------------------------------------     Results from last 7 days  Lab Units 11/19/17  0052 11/18/17  1328 11/18/17  1125   WBC 10*3/mm3 8.90  --  11.10   HEMOGLOBIN g/dL 11.7*  --  13.7   HEMATOCRIT % 36.8*  --  42.3   MCV fL 90.9  --  89.2   MCHC g/dL 31.8*  --  32.4*   PLATELETS 10*3/mm3 247  --  257   INR   --  0.95  --            Results from last 7 days  Lab Units 11/19/17 0052  11/18/17  1125   SODIUM mmol/L 138 137   POTASSIUM mmol/L 4.5 4.0   CHLORIDE mmol/L 110 107   CO2 mmol/L 23.2* 23.9*   BUN mg/dL 14 19   CREATININE mg/dL 0.82 0.80   EGFR IF NONAFRICN AM mL/min/1.73 70 72   CALCIUM mg/dL 8.5 9.4   GLUCOSE mg/dL 144* 114*   ALBUMIN g/dL  --  4.40   BILIRUBIN mg/dL  --  0.5   ALK PHOS U/L  --  77   AST (SGOT) U/L  --  25   ALT (SGPT) U/L  --  17   Estimated Creatinine Clearance: 74.1 mL/min (by C-G formula based on Cr of 0.82).  No results found for: AMMONIA          No results found for: HGBA1C  No results found for: TSH, FREET4  No results found for: PREGTESTUR, PREGSERUM, HCG, HCGQUANT  Pain Management Panel     There is no flowsheet data to display.        Blood Culture   Date Value Ref Range Status   11/18/2017 No growth at less than 24 hours  Preliminary                  ---------------------------------------------------------------------------------------------------------------------  Imaging Results (last 7 days)     Procedure Component Value Units Date/Time    XR Hip With or Without Pelvis 2 - 3 View Right [[id number]] Collected:  11/18/17 1109     Updated:  11/18/17 1111    Narrative:       EXAMINATION: XR HIP W OR WO PELVIS 2-3 VIEW RIGHT-      TECHNIQUE: XR HIP W OR WO PELVIS 2-3 VIEW RIGHT-        INDICATION: fall      COMPARISON: NONE     FINDINGS:          BONES: No acute fracture. No blastic or lytic lesions.          JOINT: No dislocation.          SOFT TISSUES:  No soft tissue mass.       Impression:       No acute or destructive bony abnormality.     COMMUNICATION: Per this written report.     This report was finalized on 11/18/2017 11:09 AM by Dr. Rufus Washington MD.       XR Femur 2 View Right [[id number]] Collected:  11/18/17 1109     Updated:  11/18/17 1112    Narrative:       EXAMINATION: XR FEMUR 2 VW RIGHT-      TECHNIQUE: XR FEMUR 2 VW RIGHT-        INDICATION: fall      COMPARISON: NONE     FINDINGS:          BONES: Moderately displaced and overlapped mid  femur fracture.          JOINT: No dislocation.          SOFT TISSUES:  No soft tissue mass.       Impression:       Moderately displaced and overlapped mid femur fracture.     COMMUNICATION: Per this written report.     This report was finalized on 11/18/2017 11:10 AM by Dr. Rufus Washington MD.       FL Surgery Fluoro [[id number]] Collected:  11/19/17 0753     Updated:  11/19/17 0755    Narrative:       EXAMINATION: FL SURGERY FLUORO-      CLINICAL INDICATION:     fx; S72.321A-Displaced transverse fracture of  shaft of right femur, initial encounter for closed fracture     TECHNIQUE: Frontal view of the region of interest..        COMPARISON: NONE      FINDINGS: Fluoroscopy during surgery.       Impression:       As above     This report was finalized on 11/19/2017 7:53 AM by Dr. Rufus Washington MD.       XR Chest 1 View [[id number]] Collected:  11/19/17 0753     Updated:  11/19/17 0756    Narrative:       EXAMINATION: XR CHEST 1 VW-      CLINICAL INDICATION:     femur fracture; S72.321A-Displaced transverse  fracture of shaft of right femur, initial encounter for closed fracture     TECHNIQUE:  XR CHEST 1 VW-      COMPARISON: NONE      FINDINGS:   The lungs remain aerated.  Heart and mediastinum contours are unremarkable.  No pleural effusion.  No pneumothorax.   Bony and soft tissue structures are unremarkable.       Impression:       No radiographic evidence of acute cardiac or pulmonary  disease.     This report was finalized on 11/19/2017 7:54 AM by Dr. Rufus Washington MD.             ---------------------------------------------------------------------------------------------------------------------  Assessment and Plan:  1. Transverse fracture of the right femur with the repair  2.   3.   4.   5.     Manjit Badillo MD  11/19/17  8:51 AM

## 2017-11-20 PROCEDURE — 97116 GAIT TRAINING THERAPY: CPT

## 2017-11-20 PROCEDURE — G8978 MOBILITY CURRENT STATUS: HCPCS

## 2017-11-20 PROCEDURE — 97530 THERAPEUTIC ACTIVITIES: CPT

## 2017-11-20 PROCEDURE — 25010000002 ENOXAPARIN PER 10 MG: Performed by: ORTHOPAEDIC SURGERY

## 2017-11-20 PROCEDURE — 94799 UNLISTED PULMONARY SVC/PX: CPT

## 2017-11-20 PROCEDURE — 97162 PT EVAL MOD COMPLEX 30 MIN: CPT

## 2017-11-20 PROCEDURE — G8979 MOBILITY GOAL STATUS: HCPCS

## 2017-11-20 RX ADMIN — ENOXAPARIN SODIUM 40 MG: 40 INJECTION SUBCUTANEOUS at 08:45

## 2017-11-20 RX ADMIN — LEVOTHYROXINE SODIUM 25 MCG: 75 TABLET ORAL at 08:45

## 2017-11-20 RX ADMIN — OXYCODONE HYDROCHLORIDE AND ACETAMINOPHEN 1 TABLET: 5; 325 TABLET ORAL at 18:57

## 2017-11-20 RX ADMIN — OXYCODONE HYDROCHLORIDE AND ACETAMINOPHEN 1 TABLET: 5; 325 TABLET ORAL at 04:29

## 2017-11-20 RX ADMIN — NAPROXEN 250 MG: 250 TABLET ORAL at 08:45

## 2017-11-20 RX ADMIN — LISINOPRIL 20 MG: 10 TABLET ORAL at 08:44

## 2017-11-20 RX ADMIN — SODIUM CHLORIDE 50 ML/HR: 9 INJECTION, SOLUTION INTRAVENOUS at 18:17

## 2017-11-20 RX ADMIN — OXYCODONE HYDROCHLORIDE AND ACETAMINOPHEN 1 TABLET: 5; 325 TABLET ORAL at 12:57

## 2017-11-20 NOTE — PLAN OF CARE
Problem: Orthopaedic Fracture (Adult)  Goal: Signs and Symptoms of Listed Potential Problems Will be Absent or Manageable (Orthopaedic Fracture)  Outcome: Ongoing (interventions implemented as appropriate)    Problem: Pain, Chronic (Adult)  Goal: Identify Related Risk Factors and Signs and Symptoms  Outcome: Ongoing (interventions implemented as appropriate)  Goal: Acceptable Pain Control/Comfort Level  Outcome: Ongoing (interventions implemented as appropriate)    Problem: Patient Care Overview (Adult)  Goal: Plan of Care Review  Outcome: Ongoing (interventions implemented as appropriate)

## 2017-11-20 NOTE — PLAN OF CARE
Problem: Orthopaedic Fracture (Adult)  Goal: Signs and Symptoms of Listed Potential Problems Will be Absent or Manageable (Orthopaedic Fracture)  Outcome: Ongoing (interventions implemented as appropriate)    11/20/17 1025   Orthopaedic Fracture   Problems Assessed (Orthopaedic Fracture) all   Problems Present (Orthopaedic Fracture) none         Problem: Pain, Chronic (Adult)  Goal: Identify Related Risk Factors and Signs and Symptoms  Outcome: Ongoing (interventions implemented as appropriate)    11/20/17 1025   Pain, Chronic   Related Risk Factors (Chronic Pain) surgery       Goal: Acceptable Pain Control/Comfort Level  Outcome: Ongoing (interventions implemented as appropriate)    11/20/17 1025   Pain, Chronic (Adult)   Acceptable Pain Control/Comfort Level making progress toward outcome         Problem: Activity Intolerance (Adult)  Goal: Identify Related Risk Factors and Signs and Symptoms  Outcome: Ongoing (interventions implemented as appropriate)    11/20/17 1025   Activity Intolerance   Activity Intolerance: Related Risk Factors generalized weakness       Goal: Activity Tolerance  Outcome: Ongoing (interventions implemented as appropriate)    11/20/17 1025   Activity Intolerance (Adult)   Activity Tolerance making progress toward outcome       Goal: Effective Energy Conservation Techniques  Outcome: Ongoing (interventions implemented as appropriate)    11/20/17 1025   Activity Intolerance (Adult)   Effective Energy Conservation Techniques making progress toward outcome         Problem: Patient Care Overview (Adult)  Goal: Plan of Care Review  Outcome: Ongoing (interventions implemented as appropriate)    11/20/17 1025   Patient Care Overview   Progress progress toward functional goals as expected   Coping/Psychosocial Response Interventions   Plan Of Care Reviewed With patient         Problem: Fall Risk (Adult)  Goal: Identify Related Risk Factors and Signs and Symptoms  Outcome: Ongoing (interventions  implemented as appropriate)    11/20/17 1025   Fall Risk   Fall Risk: Related Risk Factors environment unfamiliar   Fall Risk: Signs and Symptoms presence of risk factors       Goal: Absence of Falls  Outcome: Ongoing (interventions implemented as appropriate)    11/20/17 1025   Fall Risk (Adult)   Absence of Falls making progress toward outcome         Problem: Skin Integrity Impairment, Risk/Actual (Adult)  Goal: Identify Related Risk Factors and Signs and Symptoms  Outcome: Ongoing (interventions implemented as appropriate)    11/20/17 1025   Skin Integrity Impairment, Risk/Actual   Skin Integrity Impairment, Risk/Actual: Related Risk Factors surgery/procedure       Goal: Skin Integrity/Wound Healing  Outcome: Ongoing (interventions implemented as appropriate)    11/20/17 1025   Skin Integrity Impairment, Risk/Actual (Adult)   Skin Integrity/Wound Healing making progress toward outcome

## 2017-11-20 NOTE — PROGRESS NOTES
Discharge Planning Assessment   Kenny     Patient Name: Gilda Galdamez  MRN: [id number]  Today's Date: 11/20/2017    Admit Date: 11/18/2017 11/20/17 1418    Case Management/Social Work Plan    Plan SS spoke with pt on this date. Pt lives at home with spouse and plans to return home at discharge. Pt does not have home health services or DME at this time. Pt states she would like to short term rehab. SS spoke with [facility] per admissions, who states they will look at pt's information. SS will follow and assist as needed.     Patient/Family In Agreement With Plan yes        Discharge Placement     No information found        Expected Discharge Date and Time     Expected Discharge Date Expected Discharge Time    Nov 22, 2017               Demographic Summary       11/20/17 1416    Referral Information    Referral Source nursing    Reason For Consult discharge planning          Cristina Barney

## 2017-11-20 NOTE — PROGRESS NOTES
Progress Note   11/20/17      Subjective     Interval History:     Complaints: Resting this am no distress.  Doing fair with PT.  No dizziness, chest pain, no orthopnea.        Objective     Intake & Output (last day)       11/19 0701 - 11/20 0700    P.O. 1080    I.V. (mL/kg) 1000 (13.4)    Total Intake(mL/kg) 2080 (27.9)    Urine (mL/kg/hr) 550 (0.3)    Total Output 550    Net +1530         Unmeasured Urine Occurrence 5 x          Vital Signs  Temp:  [97.9 °F (36.6 °C)] 97.9 °F (36.6 °C)  Heart Rate:  [67-79] 79  Resp:  [16-18] 16  BP: (161-192)/(57-70) 192/70    Physical Exam:   General NAD   Lungs clear to auscultation, respirations regular and respirations unlabored   Heart regular rhythm & normal rate, normal S1, S2 and no murmur, no noemi   Abdomen normal bowel sounds, no masses, no hepatomegaly, soft non-tender   Extremities moves extremities well, no edema, no cyanosis and no redness    Labs:  Lab Results (last 72 hours)     Procedure Component Value Units Date/Time    CBC & Differential [[id number]] Collected:  11/18/17 1125    Specimen:  Blood Updated:  11/18/17 1143    Narrative:       The following orders were created for panel order CBC & Differential.  Procedure                               Abnormality         Status                     ---------                               -----------         ------                     CBC Auto Differential[[id number]]        Abnormal            Final result                 Please view results for these tests on the individual orders.    CBC Auto Differential [[id number]]  (Abnormal) Collected:  11/18/17 1125    Specimen:  Blood from Arm, Right Updated:  11/18/17 1143     WBC 11.10 10*3/mm3      RBC 4.74 10*6/mm3      Hemoglobin 13.7 g/dL      Hematocrit 42.3 %      MCV 89.2 fL      MCH 28.9 pg      MCHC 32.4 (L) g/dL      RDW 13.3 %      RDW-SD 43.7 fl      MPV 11.3 (H) fL      Platelets 257 10*3/mm3      Neutrophil % 84.2 (H) %      Lymphocyte % 10.7 (L) %       Monocyte % 4.1 %      Eosinophil % 0.4 %      Basophil % 0.2 %      Immature Grans % 0.4 %      Neutrophils, Absolute 9.35 (H) 10*3/mm3      Lymphocytes, Absolute 1.19 10*3/mm3      Monocytes, Absolute 0.46 10*3/mm3      Eosinophils, Absolute 0.04 10*3/mm3      Basophils, Absolute 0.02 10*3/mm3      Immature Grans, Absolute 0.04 (H) 10*3/mm3     Comprehensive Metabolic Panel [[id number]]  (Abnormal) Collected:  11/18/17 1125    Specimen:  Blood from Arm, Right Updated:  11/18/17 1210     Glucose 114 (H) mg/dL      BUN 19 mg/dL      Creatinine 0.80 mg/dL      Sodium 137 mmol/L      Potassium 4.0 mmol/L      Chloride 107 mmol/L      CO2 23.9 (L) mmol/L      Calcium 9.4 mg/dL      Total Protein 6.9 g/dL      Albumin 4.40 g/dL      ALT (SGPT) 17 U/L      AST (SGOT) 25 U/L      Alkaline Phosphatase 77 U/L       Note New Reference Ranges        Total Bilirubin 0.5 mg/dL      eGFR Non African Amer 72 mL/min/1.73      Globulin 2.5 gm/dL      A/G Ratio 1.8 g/dL      BUN/Creatinine Ratio 23.8     Anion Gap 6.1 mmol/L     Osmolality, Calculated [[id number]]  (Normal) Collected:  11/18/17 1125    Specimen:  Blood from Arm, Right Updated:  11/18/17 1210     Osmolality Calc 276.9 mOsm/kg     aPTT [[id number]]  (Normal) Collected:  11/18/17 1328    Specimen:  Blood Updated:  11/18/17 1352     PTT 26.8 seconds       Note new Reference Range       Narrative:       PTT Heparin Therapeutic Range:  59 - 95 seconds    Protime-INR [[id number]]  (Normal) Collected:  11/18/17 1328    Specimen:  Blood Updated:  11/18/17 1352     Protime 12.8 Seconds       Note new Reference Range        INR 0.95    Narrative:       Suggested INR therapeutic range for stable oral anticoagulant therapy:    Low Intensity therapy:   1.5-2.0  Moderate Intensity therapy:   2.0-3.0  High Intensity therapy:   2.5-4.0    CBC & Differential [[id number]] Collected:  11/19/17 0052    Specimen:  Blood Updated:  11/19/17 0141    Narrative:       The following orders were  created for panel order CBC & Differential.  Procedure                               Abnormality         Status                     ---------                               -----------         ------                     CBC Auto Differential[[id number]]        Abnormal            Final result                 Please view results for these tests on the individual orders.    CBC Auto Differential [[id number]]  (Abnormal) Collected:  11/19/17 0052    Specimen:  Blood Updated:  11/19/17 0141     WBC 8.90 10*3/mm3      RBC 4.05 (L) 10*6/mm3      Hemoglobin 11.7 (L) g/dL      Hematocrit 36.8 (L) %      MCV 90.9 fL      MCH 28.9 pg      MCHC 31.8 (L) g/dL      RDW 13.3 %      RDW-SD 43.2 fl      MPV 11.3 (H) fL      Platelets 247 10*3/mm3      Neutrophil % 81.6 (H) %      Lymphocyte % 10.9 (L) %      Monocyte % 7.3 %      Eosinophil % 0.0 %      Basophil % 0.1 %      Immature Grans % 0.1 %      Neutrophils, Absolute 7.26 (H) 10*3/mm3      Lymphocytes, Absolute 0.97 (L) 10*3/mm3      Monocytes, Absolute 0.65 10*3/mm3      Eosinophils, Absolute 0.00 10*3/mm3      Basophils, Absolute 0.01 10*3/mm3      Immature Grans, Absolute 0.01 10*3/mm3     Basic Metabolic Panel [[id number]]  (Abnormal) Collected:  11/19/17 0052    Specimen:  Blood Updated:  11/19/17 0155     Glucose 144 (H) mg/dL      BUN 14 mg/dL      Creatinine 0.82 mg/dL      Sodium 138 mmol/L      Potassium 4.5 mmol/L      Chloride 110 mmol/L      CO2 23.2 (L) mmol/L      Calcium 8.5 mg/dL      eGFR Non African Amer 70 mL/min/1.73      BUN/Creatinine Ratio 17.1     Anion Gap 4.8 mmol/L     Narrative:       GFR Normal >60  Chronic Kidney Disease <60  Kidney Failure <15    Osmolality, Calculated [[id number]]  (Normal) Collected:  11/19/17 0052    Specimen:  Blood Updated:  11/19/17 0155     Osmolality Calc 278.7 mOsm/kg     Blood Culture - Blood, [[id number]]  (Normal) Collected:  11/18/17 1910    Specimen:  Blood from Arm, Left Updated:  11/19/17 1946     Blood Culture No  growth at 24 hours          Xray:  Imaging Results (last 24 hours)     Procedure Component Value Units Date/Time    FL Surgery Fluoro [[id number]] Collected:  11/19/17 0753     Updated:  11/19/17 0755    Narrative:       EXAMINATION: FL SURGERY FLUORO-      CLINICAL INDICATION:     fx; S72.321A-Displaced transverse fracture of  shaft of right femur, initial encounter for closed fracture     TECHNIQUE: Frontal view of the region of interest..        COMPARISON: NONE      FINDINGS: Fluoroscopy during surgery.       Impression:       As above     This report was finalized on 11/19/2017 7:53 AM by Dr. Rufus Washington MD.       XR Chest 1 View [[id number]] Collected:  11/19/17 0753     Updated:  11/19/17 0756    Narrative:       EXAMINATION: XR CHEST 1 VW-      CLINICAL INDICATION:     femur fracture; S72.321A-Displaced transverse  fracture of shaft of right femur, initial encounter for closed fracture     TECHNIQUE:  XR CHEST 1 VW-      COMPARISON: NONE      FINDINGS:   The lungs remain aerated.  Heart and mediastinum contours are unremarkable.  No pleural effusion.  No pneumothorax.   Bony and soft tissue structures are unremarkable.       Impression:       No radiographic evidence of acute cardiac or pulmonary  disease.     This report was finalized on 11/19/2017 7:54 AM by Dr. Rufus Washington MD.                Results Review:     I reviewed the patient's new clinical results.    Medication Review:   Hospital Medications (active)       Dose Frequency Start End    acetaminophen (TYLENOL) tablet 325 mg 325 mg Every 4 Hours PRN 11/18/2017     Sig - Route: Take 1 tablet by mouth Every 4 (Four) Hours As Needed for Fever (>101). - Oral    acetaminophen (TYLENOL) tablet 650 mg 650 mg Every 4 Hours PRN 11/18/2017     Sig - Route: Take 2 tablets by mouth Every 4 (Four) Hours As Needed for Mild Pain . - Oral    ceFAZolin (ANCEF) IVPB (duplex) 2 g 2 g Every 8 Hours 11/18/2017 11/19/2017    Sig - Route: Infuse 2,000 mg into a venous  catheter Every 8 (Eight) Hours. - Intravenous    enoxaparin (LOVENOX) syringe 40 mg 40 mg Daily 11/19/2017     Sig - Route: Inject 0.4 mL under the skin Daily. - Subcutaneous    HYDROmorphone (DILAUDID) injection 0.5 mg 0.5 mg Every 3 Hours PRN 11/18/2017 11/28/2017    Sig - Route: Infuse 0.5 mL into a venous catheter Every 3 (Three) Hours As Needed for Moderate Pain  or Severe Pain . - Intravenous    lactated ringers infusion 125 mL/hr Continuous 11/18/2017     Sig - Route: Infuse 125 mL/hr into a venous catheter Continuous. - Intravenous    levothyroxine (SYNTHROID, LEVOTHROID) tablet 25 mcg 25 mcg Daily 11/19/2017     Sig - Route: Take 1 tablet by mouth Daily. - Oral    lisinopril (PRINIVIL,ZESTRIL) tablet 20 mg 20 mg Daily 11/19/2017     Sig - Route: Take 2 tablets by mouth Daily. - Oral    naproxen (NAPROSYN) tablet 250 mg 250 mg Daily 11/19/2017     Sig - Route: Take 1 tablet by mouth Daily. - Oral    oxyCODONE-acetaminophen (PERCOCET) 5-325 MG per tablet 1 tablet 1 tablet Every 6 Hours PRN 11/19/2017     Sig - Route: Take 1 tablet by mouth Every 6 (Six) Hours As Needed (pain). - Oral    sodium chloride 0.9 % infusion 125 mL/hr Continuous 11/18/2017     Sig - Route: Infuse 125 mL/hr into a venous catheter Continuous. - Intravenous    Cosign for Ordering: Accepted by Ramana Bass MD on 11/18/2017 11:32 AM    sodium chloride 0.9 % infusion 50 mL/hr Continuous 11/18/2017     Sig - Route: Infuse 50 mL/hr into a venous catheter Continuous. - Intravenous    oxyCODONE-acetaminophen (PERCOCET) 5-325 MG per tablet 1 tablet (Discontinued) 1 tablet Once As Needed 11/18/2017 11/19/2017    Sig - Route: Take 1 tablet by mouth 1 (One) Time As Needed (pain). - Oral          Assessment/Plan    1.Right Femur Fracture: POD #3 ORIF doing well.  Pt with a wheelchair bound  at home, she will need inpt therapy before she can go home.  Will consult inpt rehab today.     Sukhdev Ramirez MD  11/20/17  5:53 AM

## 2017-11-20 NOTE — THERAPY EVALUATION
Acute Care - Physical Therapy Initial Evaluation/Treatment Note  [facility]     Patient Name: Gilda Galdamez  : 1955  MRN: [id number]  Today's Date: 2017   Onset of Illness/Injury or Date of Surgery Date: 17 (admit date)  Date of Referral to PT: 17  Referring Physician: Vane      Admit Date: 2017     Visit Dx:    ICD-10-CM ICD-9-CM   1. Closed displaced transverse fracture of shaft of right femur, initial encounter S72.321A 821.01     Patient Active Problem List   Diagnosis   • Femur fracture, right   • Closed displaced transverse fracture of shaft of right femur     Past Medical History:   Diagnosis Date   • Disease of thyroid gland    • Hypertension      Past Surgical History:   Procedure Laterality Date   • FEMUR OPEN REDUCTION INTERNAL FIXATION Right 2017    Procedure: FEMUR OPEN REDUCTION INTERNAL FIXATION;  Surgeon: Ian Cifuentes MD;  Location: [facility];  Service:    • TONSILLECTOMY            PT ASSESSMENT (last 72 hours)      PT Evaluation       17 1556 17 1416    Rehab Evaluation    Document Type evaluation;therapy note (daily note)  -CT     Subjective Information agree to therapy;complains of;pain  -CT     Patient Effort, Rehab Treatment excellent  -CT     Symptoms Noted During/After Treatment fatigue;increased pain  -CT     Symptoms Noted Comment Pt tolerated evaluation and treatment session well with rest breaks provided as needed.   -CT     General Information    Patient Profile Review yes  -CT     Onset of Illness/Injury or Date of Surgery Date 17   admit date  -CT     Referring Physician Page  -CT     Precautions/Limitations fall precautions   WBAT RLE   -CT     Prior Level of Function independent:;all household mobility;community mobility  -CT     Equipment Currently Used at Home none  -CT     Plans/Goals Discussed With patient;agreed upon  -CT     Risks Reviewed patient:;LOB;nausea/vomiting;dizziness;increased discomfort;change in vital  signs;increased drainage;lines disloged  -CT     Benefits Reviewed patient:;improve function;increase independence;increase strength;increase balance;decrease pain;decrease risk of DVT;increase knowledge;improve skin integrity  -CT     Barriers to Rehab physical barrier  -CT     Living Environment    Lives With spouse  -CT spouse  -MC    Living Arrangements house  -CT     Home Accessibility stairs to enter home  -CT     Number of Stairs to Enter Home 3  -CT     Stair Railings at Home outside, present at both sides  -CT     Clinical Impression    Date of Referral to PT 11/19/17  -CT     Prognosis Good  -CT     Functional Level At Time Of Evaluation CGA with all functional mobility  -CT     Patient/Family Goals Statement Pt goals are to return to PLOF  -CT     Criteria for Skilled Therapeutic Interventions Met yes;treatment indicated  -CT     Pathology/Pathophysiology Noted (Describe Specifically for Each System) musculoskeletal;neuromuscular  -CT     Impairments Found (describe specific impairments) aerobic capacity/endurance;gait, locomotion, and balance  -CT     Functional Limitations in Following Categories (Describe Specific Limitations) self-care;home management  -CT     Rehab Potential good, to achieve stated therapy goals  -CT     Predicted Duration of Therapy Intervention (days/wks) length of stay  -CT     Pain Assessment    Pain Assessment 0-10  -CT     Pain Score 4  -CT     Pain Type Acute pain  -CT     Pain Location Leg  -CT     Pain Orientation Right  -CT     Pain Intervention(s) Repositioned;Rest  -CT     Cognitive Assessment/Intervention    Current Cognitive/Communication Assessment functional  -CT     Orientation Status oriented x 4  -CT     Follows Commands/Answers Questions able to follow multi-step instructions;100% of the time  -CT     Personal Safety WNL/WFL  -CT     Personal Safety Interventions fall prevention program maintained;gait belt;muscle strengthening facilitated;nonskid shoes/slippers  when out of bed  -CT     ROM (Range of Motion)    General ROM Detail LLE grossly WNL; RLE not tested  -CT     MMT (Manual Muscle Testing)    General MMT Assessment Detail LLE grossly 4/5; RLE not tested  -CT     Mobility Assessment/Training    Extremity Weight-Bearing Status right lower extremity  -CT     Right Lower Extremity Weight-Bearing weight-bearing as tolerated   per MD orders  -CT     Bed Mobility, Assessment/Treatment    Bed Mobility, Assistive Device bed rails  -CT     Bed Mobility, Roll Left, [place] contact guard assist  -CT     Bed Mobility, Roll Right, [place] contact guard assist  -CT     Bed Mobility, Scoot/Bridge, [place] contact guard assist  -CT     Bed Mob, Supine to Sit, [place] contact guard assist  -CT     Bed Mob, Sit to Supine, [place] contact guard assist  -CT     Bed Mobility, Impairments strength decreased;impaired balance  -CT     Transfer Assessment/Treatment    Transfers, Bed-Chair-Bed, Assist Device rolling walker  -CT     Transfers, Sit-Stand [place] contact guard assist  -CT     Transfers, Stand-Sit [place] contact guard assist  -CT     Transfers, Sit-Stand-Sit, Assist Device rolling walker  -CT     Gait Assessment/Treatment    Gait, [place] Level contact guard assist  -CT     Gait, Assistive Device rolling walker  -CT     Gait, Distance (Feet) 100   100 ft in AM; 120 ft in PM  -CT     Gait, Gait Pattern Analysis swing-to gait  -CT     Gait, Gait Deviations antalgic  -CT     Gait, Safety Issues step length decreased  -CT     Gait, Impairments strength decreased;impaired balance  -CT     Therapy Exercises    Bilateral Lower Extremities AROM:;10 reps;sitting  -CT     Positioning and Restraints    Pre-Treatment Position in bed  -CT     Post Treatment Position --  -CT     In Bed supine;call light within reach;encouraged to call for assist   in PM  -CT     In Chair sitting;call light within reach;encouraged to call for assist;notified nsg   in AM   -CT       11/20/17 0830 11/19/17 1930    Muscle Tone Assessment    Muscle Tone Assessment RLE  -SD RLE  -EM    RLE Muscle Tone Assessment mildly decreased tone   right femur fx  -SD mildly decreased tone  -EM      11/18/17 1756 11/18/17 1300    General Information    Equipment Currently Used at Home none  -PL none  -JM    Living Environment    Lives With spouse  -PL spouse  -JM    Living Arrangements house  -PL house  -JM    Home Accessibility  stairs (1 railing present)  -JM    Stair Railings at Home  inside, present at both sides;outside, present at both sides  -JM    Type of Financial/Environmental Concern  none  -    Transportation Available family or friend will provide  -PL family or friend will provide  -      User Key  (r) = Recorded By, (t) = Taken By, (c) = Cosigned By    Initials Name Provider Type    SD Octavia Spears, RN Registered Nurse    CT Melani Estrada, PT Physical Therapist    PL Aurea Osorio, RN Case Manager     Cristina Barney     ARELI Blood, RN Registered Nurse    EM Lani Hi, RN Registered Nurse          Physical Therapy Education     Title: PT OT SLP Therapies (Done)     Topic: Physical Therapy (Done)     Point: Mobility training (Done)    Learning Progress Summary    Learner Readiness Method Response Comment Documented by Status   Patient Acceptance E [facility] 11/20/17 1629 Done               Point: Home exercise program (Done)    Learning Progress Summary    Learner Readiness Method Response Comment Documented by Status   Patient Acceptance E   CT 11/20/17 1629 Done               Point: Body mechanics (Done)    Learning Progress Summary    Learner Readiness Method Response Comment Documented by Status   Patient Acceptance E   CT 11/20/17 1629 Done               Point: Precautions (Done)    Learning Progress Summary    Learner Readiness Method Response Comment Documented by Status   Patient Acceptance E [facility] 11/20/17 1629 Done                       User Key     Initials Effective Dates Name Provider Type Discipline    CT 03/14/16 -  Melani Estrada, PT Physical Therapist PT                PT Recommendation and Plan  Anticipated Equipment Needs At Discharge: front wheeled walker, bedside commode  Anticipated Discharge Disposition:  (to be determined based on progress)  Planned Therapy Interventions: balance training, bed mobility training, gait training, home exercise program, neuromuscular re-education, joint mobilization, patient/family education, ROM (Range of Motion), stair training, postural re-education, strengthening, transfer training  PT Frequency: 2 times/day, 5 times/wk, per priority policy             IP PT Goals       11/20/17 1626          Transfer Training PT LTG    Transfer Training PT LTG, Date Established 11/20/17  -CT      Transfer Training PT LTG, Time to Achieve by discharge  -CT      Transfer Training PT LTG, Activity Type bed to chair /chair to bed;sit to stand/stand to sit  -CT      Transfer Training PT LTG, [place] Level conditional independence  -CT      Transfer Training PT LTG, Assist Device other (see comments)   with appropriate AD  -CT      Gait Training PT LTG    Gait Training Goal PT LTG, Date Established 11/20/17  -CT      Gait Training Goal PT LTG, Time to Achieve by discharge  -CT      Gait Training Goal PT LTG, [place] Level conditional independence  -CT      Gait Training Goal PT LTG, Assist Device other (see comments)   with appropriate AD  -CT      Gait Training Goal PT LTG, Distance to Achieve 120  -CT        User Key  (r) = Recorded By, (t) = Taken By, (c) = Cosigned By    Initials Name Provider Type    CT Melani Etsrada, PT Physical Therapist                Outcome Measures       11/20/17 1600          How much help from another person do you currently need...    Turning from your back to your side while in flat bed without using bedrails? 4  -CT      Moving from lying on back to sitting on  the side of a flat bed without bedrails? 4  -CT      Moving to and from a bed to a chair (including a wheelchair)? 3  -CT      Standing up from a chair using your arms (e.g., wheelchair, bedside chair)? 3  -CT      Climbing 3-5 steps with a railing? 2  -CT      To walk in hospital room? 3  -CT      AM-PAC 6 Clicks Score 19  -CT      Functional Assessment    Outcome Measure Options AM-PAC 6 Clicks Basic Mobility (PT)  -CT        User Key  (r) = Recorded By, (t) = Taken By, (c) = Cosigned By    Initials Name Provider Type    CT Melani Estrada PT Physical Therapist           Time Calculation:         PT Charges       11/20/17 1636 11/20/17 1635       Time Calculation    PT Received On 11/20/17  -CT 11/20/17  -CT     PT - Next Appointment 11/21/17  -CT 11/21/17  -CT     PT Goal Re-Cert Due Date 12/04/17  -CT 12/04/17  -CT     Time Calculation- PT    Total Timed Code Minutes- PT 26 minute(s)   PM  -CT 45 minute(s)   AM  -CT       User Key  (r) = Recorded By, (t) = Taken By, (c) = Cosigned By    Initials Name Provider Type    CT Melani Estrada PT Physical Therapist          Therapy Charges for Today     Code Description Service Date Service Provider Modifiers Qty    26720231719 HC PT MOBILITY CURRENT 11/20/2017 Melani Estrada, PT GP, CJ 1    98414837223 HC PT MOBILITY PROJECTED 11/20/2017 Melani Estrada, PT GP, CI 1    26937946938 HC PT EVAL MOD COMPLEXITY 2 11/20/2017 Melani Estrada, PT GP 1    66376708519 HC GAIT TRAINING EA 15 MIN 11/20/2017 Melani Estrada, PT GP 1    57566996036 HC PT THER SUPP EA 15 MIN 11/20/2017 Melani Estrada PT GP 3    53379696736 HC GAIT TRAINING EA 15 MIN 11/20/2017 Melani Estrada, PT GP 1    92358089831 HC PT THERAPEUTIC ACT EA 15 MIN 11/20/2017 Melani Estrada, PT GP 1    19659877251 HC PT THER SUPP EA 15 MIN 11/20/2017 Melani Estrada, PT GP 2          PT G-Codes  Outcome Measure Options: AM-PAC 6 Clicks Basic Mobility (PT)  Score: 19  Functional Limitation: Mobility: Walking and moving  around  Mobility: Walking and Moving Around Current Status (): At least 20 percent but less than 40 percent impaired, limited or restricted  Mobility: Walking and Moving Around Goal Status (): At least 1 percent but less than 20 percent impaired, limited or restricted      Melani Estrada, PT  11/20/2017

## 2017-11-20 NOTE — PLAN OF CARE
Problem: Inpatient Physical Therapy  Goal: Transfer Training Goal 1 LTG- PT    11/20/17 1626   Transfer Training PT LTG   Transfer Training PT LTG, Date Established 11/20/17   Transfer Training PT LTG, Time to Achieve by discharge   Transfer Training PT LTG, Activity Type bed to chair /chair to bed;sit to stand/stand to sit   Transfer Training PT LTG, [place] Level conditional independence   Transfer Training PT LTG, Assist Device other (see comments)  (with appropriate AD)       Goal: Gait Training Goal LTG- PT    11/20/17 1626   Gait Training PT LTG   Gait Training Goal PT LTG, Date Established 11/20/17   Gait Training Goal PT LTG, Time to Achieve by discharge   Gait Training Goal PT LTG, [place] Level conditional independence   Gait Training Goal PT LTG, Assist Device other (see comments)  (with appropriate AD)   Gait Training Goal PT LTG, Distance to Achieve 120

## 2017-11-20 NOTE — PAYOR COMM NOTE
"[facility]   NIKI JARAMILLO  PHONE  [phone]  FAX  [phone]    CLAIM# [id number]    Chetna Hsu (62 y.o. Female)     Date of Birth Social Security Number Address Home Phone MRN    1955  [address] [id number] [phone] [id number]    Church Marital Status          [employer]        Admission Date Admission Type Admitting Provider Attending Provider Department, Room/Bed    17 Emergency Sukhdev Ramirez MD Perry, Kelvin D, MD [address], 3330/1P    Discharge Date Discharge Disposition Discharge Destination                      Attending Provider: Sukhdev Ramirez MD     Allergies:  No Known Allergies    Isolation:  None   Infection:  None   Code Status:  FULL    Ht:  67\" (170.2 cm)   Wt:  164 lb 7 oz (74.6 kg)    Admission Cmt:  None   Principal Problem:  Closed displaced transverse fracture of shaft of right femur [S72.321A] More...                 Active Insurance as of 2017     Primary Coverage     Payor Plan Insurance Group Employer/Plan Group    WORKERS COMPENSATION MISC WORKERS COMPENSATION      Coverage Address Coverage Phone Number Effective From Effective To    [address] [address] [phone] 2017     [place] [address]       Subscriber Name Subscriber Birth Date Member ID       CHETNA HSU [id number]                 Emergency Contacts      (Rel.) Home Phone Work Phone Mobile Phone    George Hsu (Friend) [phone] -- --               History & Physical      Manjit Badillo MD at 2017  8:51 AM          Patient Identification:  Name:  Chetna Hsu  Age:  62 y.o.  Sex:  female  :  1955  MRN:  [id number]   Visit Number:  [id number]  Primary Care Physician:  Sukhdev Ramirez MD       Chief complaint: Right femur fracture    History of presenting illness:  62 y.o. female was at work at [employer] and fractured her right femur spontaneously.  She is brought to emergency " room x-rayed and then sent to the OR where she had disrepair.  ---------------------------------------------------------------------------------------------------------------------   Review Of Systems:  See HPI  ---------------------------------------------------------------------------------------------------------------------   Past Medical History:   Diagnosis Date   • Disease of thyroid gland    • Hypertension      Past Surgical History:   Procedure Laterality Date   • TONSILLECTOMY       History reviewed. No pertinent family history.  Social History     Social History   • Marital status:      Spouse name: N/A   • Number of children: N/A   • Years of education: N/A     Social History Main Topics   • Smoking status: Former Smoker   • Smokeless tobacco: Never Used   • Alcohol use No   • Drug use: No   • Sexual activity: Not Asked     Other Topics Concern   • None     Social History Narrative   • None     ---------------------------------------------------------------------------------------------------------------------   Allergies:  Review of patient's allergies indicates no known allergies.  ---------------------------------------------------------------------------------------------------------------------   Prior to Admission Medications     Prescriptions Last Dose Informant Patient Reported? Taking?    levothyroxine (SYNTHROID, LEVOTHROID) 25 MCG tablet 11/18/2017 Self Yes Yes    Take 25 mcg by mouth Daily.    lisinopril (PRINIVIL,ZESTRIL) 20 MG tablet 11/18/2017 Self Yes Yes    Take 20 mg by mouth Daily.    naproxen sodium (ALEVE) 220 MG tablet 11/18/2017 Self Yes Yes    Take 220 mg by mouth Daily.        Hospital Scheduled Meds:    ceFAZolin 2 g Intravenous Q8H   enoxaparin 40 mg Subcutaneous Daily   levothyroxine 25 mcg Oral Daily   lisinopril 20 mg Oral Daily   naproxen 250 mg Oral Daily       lactated ringers 125 mL/hr    sodium chloride 125 mL/hr Last Rate: 125 mL/hr (11/18/17 1138)   sodium  chloride 50 mL/hr Last Rate: 50 mL/hr (11/18/17 1414)     ---------------------------------------------------------------------------------------------------------------------   Vital Signs:  Temp:  [97.6 °F (36.4 °C)-99.5 °F (37.5 °C)] 97.9 °F (36.6 °C)  Heart Rate:  [61-87] 67  Resp:  [9-24] 18  BP: (130-183)/(53-90) 161/57  Last 3 weights    11/18/17  0921 11/18/17  1243   Weight: 140 lb (63.5 kg) 164 lb 7 oz (74.6 kg)     Body mass index is 25.75 kg/(m^2).  ---------------------------------------------------------------------------------------------------------------------   Physical Exam:  Constitutional: Patient alert oriented no acute distress    HENT:  Pupils equal reactive extra movements intact no jaundice neck supple   Eyes:    Neck:      Cardiovascular:  S1 and S2 without murmur  Pulmonary/Chest:  Lungs clear  Abdominal:  Soft nontender   Musculoskeletal:  Right femur dressed good pulses    Neurological:   Skin:    Psychiatric:      ---------------------------------------------------------------------------------------------------------------------      ---------------------------------------------------------------------------------------------------------------------     Results from last 7 days  Lab Units 11/19/17  0052 11/18/17  1328 11/18/17  1125   WBC 10*3/mm3 8.90  --  11.10   HEMOGLOBIN g/dL 11.7*  --  13.7   HEMATOCRIT % 36.8*  --  42.3   MCV fL 90.9  --  89.2   MCHC g/dL 31.8*  --  32.4*   PLATELETS 10*3/mm3 247  --  257   INR   --  0.95  --            Results from last 7 days  Lab Units 11/19/17 0052 11/18/17  1125   SODIUM mmol/L 138 137   POTASSIUM mmol/L 4.5 4.0   CHLORIDE mmol/L 110 107   CO2 mmol/L 23.2* 23.9*   BUN mg/dL 14 19   CREATININE mg/dL 0.82 0.80   EGFR IF NONAFRICN AM mL/min/1.73 70 72   CALCIUM mg/dL 8.5 9.4   GLUCOSE mg/dL 144* 114*   ALBUMIN g/dL  --  4.40   BILIRUBIN mg/dL  --  0.5   ALK PHOS U/L  --  77   AST (SGOT) U/L  --  25   ALT (SGPT) U/L  --  17   Estimated  Creatinine Clearance: 74.1 mL/min (by C-G formula based on Cr of 0.82).  No results found for: AMMONIA          No results found for: HGBA1C  No results found for: TSH, FREET4  No results found for: PREGTESTUR, PREGSERUM, HCG, HCGQUANT  Pain Management Panel     There is no flowsheet data to display.        Blood Culture   Date Value Ref Range Status   11/18/2017 No growth at less than 24 hours  Preliminary                  ---------------------------------------------------------------------------------------------------------------------  Imaging Results (last 7 days)     Procedure Component Value Units Date/Time    XR Hip With or Without Pelvis 2 - 3 View Right [[id number]] Collected:  11/18/17 1109     Updated:  11/18/17 1111    Narrative:       EXAMINATION: XR HIP W OR WO PELVIS 2-3 VIEW RIGHT-      TECHNIQUE: XR HIP W OR WO PELVIS 2-3 VIEW RIGHT-        INDICATION: fall      COMPARISON: NONE     FINDINGS:          BONES: No acute fracture. No blastic or lytic lesions.          JOINT: No dislocation.          SOFT TISSUES:  No soft tissue mass.       Impression:       No acute or destructive bony abnormality.     COMMUNICATION: Per this written report.     This report was finalized on 11/18/2017 11:09 AM by Dr. Rufus Washington MD.       XR Femur 2 View Right [[id number]] Collected:  11/18/17 1109     Updated:  11/18/17 1112    Narrative:       EXAMINATION: XR FEMUR 2 VW RIGHT-      TECHNIQUE: XR FEMUR 2 VW RIGHT-        INDICATION: fall      COMPARISON: NONE     FINDINGS:          BONES: Moderately displaced and overlapped mid femur fracture.          JOINT: No dislocation.          SOFT TISSUES:  No soft tissue mass.       Impression:       Moderately displaced and overlapped mid femur fracture.     COMMUNICATION: Per this written report.     This report was finalized on 11/18/2017 11:10 AM by Dr. Rufus Washington MD.       FL Surgery Fluoro [[id number]] Collected:  11/19/17 0753     Updated:  11/19/17 0755     Narrative:       EXAMINATION: FL SURGERY FLUORO-      CLINICAL INDICATION:     fx; S72.321A-Displaced transverse fracture of  shaft of right femur, initial encounter for closed fracture     TECHNIQUE: Frontal view of the region of interest..        COMPARISON: NONE      FINDINGS: Fluoroscopy during surgery.       Impression:       As above     This report was finalized on 11/19/2017 7:53 AM by Dr. Rufus Washington MD.       XR Chest 1 View [[id number]] Collected:  11/19/17 0753     Updated:  11/19/17 0756    Narrative:       EXAMINATION: XR CHEST 1 VW-      CLINICAL INDICATION:     femur fracture; S72.321A-Displaced transverse  fracture of shaft of right femur, initial encounter for closed fracture     TECHNIQUE:  XR CHEST 1 VW-      COMPARISON: NONE      FINDINGS:   The lungs remain aerated.  Heart and mediastinum contours are unremarkable.  No pleural effusion.  No pneumothorax.   Bony and soft tissue structures are unremarkable.       Impression:       No radiographic evidence of acute cardiac or pulmonary  disease.     This report was finalized on 11/19/2017 7:54 AM by Dr. Rufus Washington MD.             ---------------------------------------------------------------------------------------------------------------------  Assessment and Plan:  1. Transverse fracture of the right femur with the repair  2.   3.   4.   5.     Manjit Badillo MD  11/19/17  8:51 AM     Electronically signed by Manjit Badillo MD at 11/19/2017  8:53 AM           Emergency Department Notes      CAROLYN Francisco at 11/18/2017  9:32 AM     Attestation signed by Ramana Bass MD at 11/18/2017 12:58 PM              For this patient encounter, I reviewed the NP or PA documentation, treatment plan, and medical decision making. Ramana Bass MD 11/18/2017 12:58 PM                               Subjective   Patient is a 62 y.o. female presenting with fall.   Fall   Mechanism of injury: fall    Injury location:  Leg  Leg injury  location:  R lower leg  Incident location:  Work  Time since incident:  1 hour  Arrived directly from scene: yes    Fall:     Fall occurred:  Tripped    Impact surface:  Hard floor    Entrapped after fall: no    Suspicion of alcohol use: no    Suspicion of drug use: no    Associated symptoms: no abdominal pain, no back pain, no chest pain, no loss of consciousness, no nausea and no vomiting    Associated symptoms comment:  Patient reports she was walking at work when she tripped and fell.  She denies head injury or neck injury.  No back pain or chest pain.  No preceding events.      Review of Systems   Constitutional: Negative.  Negative for fever.   HENT: Negative.    Respiratory: Negative.    Cardiovascular: Negative.  Negative for chest pain.   Gastrointestinal: Negative.  Negative for abdominal pain, nausea and vomiting.   Endocrine: Negative.    Genitourinary: Negative.  Negative for dysuria.   Musculoskeletal: Negative for back pain.   Skin: Negative.    Neurological: Negative.  Negative for loss of consciousness.   Psychiatric/Behavioral: Negative.    All other systems reviewed and are negative.      Past Medical History:   Diagnosis Date   • Disease of thyroid gland    • Hypertension        No Known Allergies    Past Surgical History:   Procedure Laterality Date   • TONSILLECTOMY         History reviewed. No pertinent family history.    Social History     Social History   • Marital status:      Spouse name: N/A   • Number of children: N/A   • Years of education: N/A     Social History Main Topics   • Smoking status: Former Smoker   • Smokeless tobacco: Never Used   • Alcohol use No   • Drug use: No   • Sexual activity: Not Asked     Other Topics Concern   • None     Social History Narrative   • None           Objective   Physical Exam   Constitutional: She is oriented to person, place, and time. She appears well-developed and well-nourished. No distress.   HENT:   Head: Normocephalic and atraumatic.    Right Ear: External ear normal.   Left Ear: External ear normal.   Nose: Nose normal.   Eyes: Conjunctivae and EOM are normal. Pupils are equal, round, and reactive to light.   Neck: Normal range of motion. Neck supple. No JVD present. No tracheal deviation present.   Cardiovascular: Normal rate, regular rhythm and normal heart sounds.    No murmur heard.  Pulmonary/Chest: Effort normal and breath sounds normal. No respiratory distress. She has no wheezes.   Abdominal: Soft. Bowel sounds are normal. There is no tenderness.   Musculoskeletal: Normal range of motion. She exhibits tenderness (mid femur.  Patient is neurovascularly intact distally.). She exhibits no edema or deformity.   Neurological: She is alert and oriented to person, place, and time. No cranial nerve deficit.   Skin: Skin is warm and dry. No rash noted. She is not diaphoretic. No erythema. No pallor.   Psychiatric: She has a normal mood and affect. Her behavior is normal. Thought content normal.   Nursing note and vitals reviewed.      Procedures        ED Course  ED Course   Comment By Time   Discussed with Dr. Badillo and Dr. Cifuentes.  Patient is admitted.  She will remain nothing by mouth and is to have surgery.  Dr. Cifuentes recommends 5 pounds of Holden traction. No splint CAROLYN Francisco 11/18 1110                  MDM  Number of Diagnoses or Management Options  new and requires workup     Amount and/or Complexity of Data Reviewed  Tests in the radiology section of CPT®:  ordered and reviewed  Independent visualization of images, tracings, or specimens: yes        Final diagnoses:   Closed displaced transverse fracture of shaft of right femur, initial encounter            CAROYLN Francisco  11/18/17 1259       Electronically signed by Ramana Bass MD at 11/18/2017 12:58 PM      Scarlet Steward RN at 11/18/2017 12:07 PM          Pt undressed and gowned for preparation to floor.     Scarlet Steward RN  11/18/17 1293        Electronically signed by Scarlet Steward RN at 11/18/2017 12:07 PM      Scarlet Steward RN at 11/18/2017 12:12 PM          Pt has been npo while in ed.     Scarlet Steward RN  11/18/17 1212       Electronically signed by Scarlet Steward RN at 11/18/2017 12:12 PM      Scarlet Steward RN at 11/18/2017 12:31 PM          Pt waiting on transport to floor. Fall precautions maintained.     Scarlet Steward RN  11/18/17 1232       Electronically signed by Scarlet Steward RN at 11/18/2017 12:32 PM      Scarlet Steward RN at 11/18/2017 12:34 PM          Xray at bedside.     Scarlet Steward RN  11/18/17 1234       Electronically signed by Scarlet Steward RN at 11/18/2017 12:34 PM        Hospital Medications (active)       Dose Frequency Start End    acetaminophen (TYLENOL) tablet 325 mg 325 mg Every 4 Hours PRN 11/18/2017     Sig - Route: Take 1 tablet by mouth Every 4 (Four) Hours As Needed for Fever (>101). - Oral    acetaminophen (TYLENOL) tablet 650 mg 650 mg Every 4 Hours PRN 11/18/2017     Sig - Route: Take 2 tablets by mouth Every 4 (Four) Hours As Needed for Mild Pain . - Oral    ceFAZolin (ANCEF) IVPB (duplex) 2 g 2 g Every 8 Hours 11/18/2017 11/19/2017    Sig - Route: Infuse 2,000 mg into a venous catheter Every 8 (Eight) Hours. - Intravenous    enoxaparin (LOVENOX) syringe 40 mg 40 mg Daily 11/19/2017     Sig - Route: Inject 0.4 mL under the skin Daily. - Subcutaneous    HYDROmorphone (DILAUDID) injection 0.5 mg 0.5 mg Every 3 Hours PRN 11/18/2017 11/28/2017    Sig - Route: Infuse 0.5 mL into a venous catheter Every 3 (Three) Hours As Needed for Moderate Pain  or Severe Pain . - Intravenous    lactated ringers infusion 125 mL/hr Continuous 11/18/2017     Sig - Route: Infuse 125 mL/hr into a venous catheter Continuous. - Intravenous    levothyroxine (SYNTHROID, LEVOTHROID) tablet 25 mcg 25 mcg Daily 11/19/2017     Sig - Route: Take 1 tablet by mouth  Daily. - Oral    lisinopril (PRINIVIL,ZESTRIL) tablet 20 mg 20 mg Daily 11/19/2017     Sig - Route: Take 2 tablets by mouth Daily. - Oral    naproxen (NAPROSYN) tablet 250 mg 250 mg Daily 11/19/2017     Sig - Route: Take 1 tablet by mouth Daily. - Oral    oxyCODONE-acetaminophen (PERCOCET) 5-325 MG per tablet 1 tablet 1 tablet Every 6 Hours PRN 11/19/2017     Sig - Route: Take 1 tablet by mouth Every 6 (Six) Hours As Needed (pain). - Oral    sodium chloride 0.9 % infusion 125 mL/hr Continuous 11/18/2017     Sig - Route: Infuse 125 mL/hr into a venous catheter Continuous. - Intravenous    Cosign for Ordering: Accepted by Ramana Bass MD on 11/18/2017 11:32 AM    sodium chloride 0.9 % infusion 50 mL/hr Continuous 11/18/2017     Sig - Route: Infuse 50 mL/hr into a venous catheter Continuous. - Intravenous    oxyCODONE-acetaminophen (PERCOCET) 5-325 MG per tablet 1 tablet (Discontinued) 1 tablet Once As Needed 11/18/2017 11/19/2017    Sig - Route: Take 1 tablet by mouth 1 (One) Time As Needed (pain). - Oral             Operative/Procedure Notes (last 72 hours) (Notes from 11/17/2017 10:52 AM through 11/20/2017 10:52 AM)      Ian Cifuentes MD at 11/18/2017  5:08 PM  Version 1 of 1         Preoperative diagnostic: Displaced fracture midshaft right femur closed.    Postoperative diagnostic: Same    Procedure: Open reduction and internal fixation with long femoral locking nail.    Patient under general anesthesia lying supine on the fracture table, I first proceeded with a closed reduction under C-arm guidance and then I placed her right foot in traction to maintain the reduction.  Prepping and draping of her right hip and right thigh as usual.  A 6 cm incision above the greater trochanter.  Opening of the fascia ade.  Insertion of a cannulated curved awl in the piriformis fossa that is pushed into the proximal femur under C-arm guidance.  Placement of a long guide pin through that awl that I anchor in the distal  femur.  Progressive reaming of the femur up to 12.5 mm.  Insertion of a long femoral locking nail that measured 40 cm.  Diameter 11 mm.  I locked the nail proximally with an helicoidal blade of 85 mm.  Distally tthe nail his locked with 2 bicortical screws.  The proximal wound is closed with Vicryl No. 1 and staples on the skin. The other incisions for locking are closed with staples only.  Sterile dressing with Adaptics, 4 x 4, surgical pad and tape.  Estimated blood loss 150 cc.  Patient is transfer on her back in her bed  in good stable condition.  She tolerated the procedure well.  We saved images with the C-arm.       Electronically signed by Ian Cifuentes MD at 11/18/2017  5:19 PM           Physician Progress Notes (last 72 hours) (Notes from 11/17/2017 10:52 AM through 11/20/2017 10:52 AM)      GUERLINE Poe at 11/19/2017  7:32 AM  Version 1 of 1              LOS: 1 day     Chief Complaint:  Right femur fracture    Subjective  lying in bed NAD.     Interval History: POD 1 right femur ORIF due to work related fracture.  Doing well this AM, pain controlled.     Patient Complaints: none  History taken from: patient      Objective POD 1 right femur ORIF, dressing CDI, neuro intact.     Vital Signs  Temp:  [97.6 °F (36.4 °C)-99.5 °F (37.5 °C)] 98.6 °F (37 °C)  Heart Rate:  [61-87] 80  Resp:  [9-24] 16  BP: (130-183)/(53-90) 138/68    Labs & Rads  Lab Results (last 72 hours)     Procedure Component Value Units Date/Time    CBC & Differential [[id number]] Collected:  11/18/17 1125    Specimen:  Blood Updated:  11/18/17 1143    Narrative:       The following orders were created for panel order CBC & Differential.  Procedure                               Abnormality         Status                     ---------                               -----------         ------                     CBC Auto Differential[[id number]]        Abnormal            Final result                 Please view results for these  tests on the individual orders.    CBC Auto Differential [[id number]]  (Abnormal) Collected:  11/18/17 1125    Specimen:  Blood from Arm, Right Updated:  11/18/17 1143     WBC 11.10 10*3/mm3      RBC 4.74 10*6/mm3      Hemoglobin 13.7 g/dL      Hematocrit 42.3 %      MCV 89.2 fL      MCH 28.9 pg      MCHC 32.4 (L) g/dL      RDW 13.3 %      RDW-SD 43.7 fl      MPV 11.3 (H) fL      Platelets 257 10*3/mm3      Neutrophil % 84.2 (H) %      Lymphocyte % 10.7 (L) %      Monocyte % 4.1 %      Eosinophil % 0.4 %      Basophil % 0.2 %      Immature Grans % 0.4 %      Neutrophils, Absolute 9.35 (H) 10*3/mm3      Lymphocytes, Absolute 1.19 10*3/mm3      Monocytes, Absolute 0.46 10*3/mm3      Eosinophils, Absolute 0.04 10*3/mm3      Basophils, Absolute 0.02 10*3/mm3      Immature Grans, Absolute 0.04 (H) 10*3/mm3     Comprehensive Metabolic Panel [[id number]]  (Abnormal) Collected:  11/18/17 1125    Specimen:  Blood from Arm, Right Updated:  11/18/17 1210     Glucose 114 (H) mg/dL      BUN 19 mg/dL      Creatinine 0.80 mg/dL      Sodium 137 mmol/L      Potassium 4.0 mmol/L      Chloride 107 mmol/L      CO2 23.9 (L) mmol/L      Calcium 9.4 mg/dL      Total Protein 6.9 g/dL      Albumin 4.40 g/dL      ALT (SGPT) 17 U/L      AST (SGOT) 25 U/L      Alkaline Phosphatase 77 U/L       Note New Reference Ranges        Total Bilirubin 0.5 mg/dL      eGFR Non African Amer 72 mL/min/1.73      Globulin 2.5 gm/dL      A/G Ratio 1.8 g/dL      BUN/Creatinine Ratio 23.8     Anion Gap 6.1 mmol/L     Osmolality, Calculated [[id number]]  (Normal) Collected:  11/18/17 1125    Specimen:  Blood from Arm, Right Updated:  11/18/17 1210     Osmolality Calc 276.9 mOsm/kg     aPTT [[id number]]  (Normal) Collected:  11/18/17 1328    Specimen:  Blood Updated:  11/18/17 1352     PTT 26.8 seconds       Note new Reference Range       Narrative:       PTT Heparin Therapeutic Range:  59 - 95 seconds    Protime-INR [[id number]]  (Normal) Collected:  11/18/17 8211     Specimen:  Blood Updated:  11/18/17 1352     Protime 12.8 Seconds       Note new Reference Range        INR 0.95    Narrative:       Suggested INR therapeutic range for stable oral anticoagulant therapy:    Low Intensity therapy:   1.5-2.0  Moderate Intensity therapy:   2.0-3.0  High Intensity therapy:   2.5-4.0    Blood Culture - Blood, [[id number]] Collected:  11/18/17 1910    Specimen:  Blood from Arm, Left Updated:  11/18/17 1936    CBC & Differential [[id number]] Collected:  11/19/17 0052    Specimen:  Blood Updated:  11/19/17 0141    Narrative:       The following orders were created for panel order CBC & Differential.  Procedure                               Abnormality         Status                     ---------                               -----------         ------                     CBC Auto Differential[[id number]]        Abnormal            Final result                 Please view results for these tests on the individual orders.    CBC Auto Differential [[id number]]  (Abnormal) Collected:  11/19/17 0052    Specimen:  Blood Updated:  11/19/17 0141     WBC 8.90 10*3/mm3      RBC 4.05 (L) 10*6/mm3      Hemoglobin 11.7 (L) g/dL      Hematocrit 36.8 (L) %      MCV 90.9 fL      MCH 28.9 pg      MCHC 31.8 (L) g/dL      RDW 13.3 %      RDW-SD 43.2 fl      MPV 11.3 (H) fL      Platelets 247 10*3/mm3      Neutrophil % 81.6 (H) %      Lymphocyte % 10.9 (L) %      Monocyte % 7.3 %      Eosinophil % 0.0 %      Basophil % 0.1 %      Immature Grans % 0.1 %      Neutrophils, Absolute 7.26 (H) 10*3/mm3      Lymphocytes, Absolute 0.97 (L) 10*3/mm3      Monocytes, Absolute 0.65 10*3/mm3      Eosinophils, Absolute 0.00 10*3/mm3      Basophils, Absolute 0.01 10*3/mm3      Immature Grans, Absolute 0.01 10*3/mm3     Basic Metabolic Panel [[id number]]  (Abnormal) Collected:  11/19/17 0052    Specimen:  Blood Updated:  11/19/17 0155     Glucose 144 (H) mg/dL      BUN 14 mg/dL      Creatinine 0.82 mg/dL      Sodium 138 mmol/L       Potassium 4.5 mmol/L      Chloride 110 mmol/L      CO2 23.2 (L) mmol/L      Calcium 8.5 mg/dL      eGFR Non African Amer 70 mL/min/1.73      BUN/Creatinine Ratio 17.1     Anion Gap 4.8 mmol/L     Narrative:       GFR Normal >60  Chronic Kidney Disease <60  Kidney Failure <15    Osmolality, Calculated [[id number]]  (Normal) Collected:  11/19/17 0052    Specimen:  Blood Updated:  11/19/17 0155     Osmolality Calc 278.7 mOsm/kg         Imaging Results (last 72 hours)     Procedure Component Value Units Date/Time    XR Hip With or Without Pelvis 2 - 3 View Right [[id number]] Collected:  11/18/17 1109     Updated:  11/18/17 1111    Narrative:       EXAMINATION: XR HIP W OR WO PELVIS 2-3 VIEW RIGHT-      TECHNIQUE: XR HIP W OR WO PELVIS 2-3 VIEW RIGHT-        INDICATION: fall      COMPARISON: NONE     FINDINGS:          BONES: No acute fracture. No blastic or lytic lesions.          JOINT: No dislocation.          SOFT TISSUES:  No soft tissue mass.       Impression:       No acute or destructive bony abnormality.     COMMUNICATION: Per this written report.     This report was finalized on 11/18/2017 11:09 AM by Dr. Rufus Washington MD.       XR Femur 2 View Right [[id number]] Collected:  11/18/17 1109     Updated:  11/18/17 1112    Narrative:       EXAMINATION: XR FEMUR 2 VW RIGHT-      TECHNIQUE: XR FEMUR 2 VW RIGHT-        INDICATION: fall      COMPARISON: NONE     FINDINGS:          BONES: Moderately displaced and overlapped mid femur fracture.          JOINT: No dislocation.          SOFT TISSUES:  No soft tissue mass.       Impression:       Moderately displaced and overlapped mid femur fracture.     COMMUNICATION: Per this written report.     This report was finalized on 11/18/2017 11:10 AM by Dr. Rufus Washington MD.       XR Chest 1 View [[id number]] Updated:  11/18/17 1249    FL Surgery Fluoro [[id number]] Updated:  11/18/17 1657          Physical Exam:   Physical Exam   Constitutional: She is oriented to person, place,  and time. She appears well-developed and well-nourished. No distress.   HENT:   Head: Normocephalic.   Pulmonary/Chest: Effort normal.   Musculoskeletal:   Right femur dressing CDI, + dorsi/planter flexion, +pp.    Neurological: She is alert and oriented to person, place, and time.   Skin: Skin is warm and dry. She is not diaphoretic.   Psychiatric: She has a normal mood and affect. Her behavior is normal.   Vitals reviewed.       Results Review:     I reviewed the patient's new clinical results.      Assessment/Plan     Principal Problem:    Closed displaced transverse fracture of shaft of right femur  Active Problems:    Femur fracture, right s/p ORIF, begin dressing changes on POD 2, begin P.T.           Plan for disposition:follow up ortho in 2 weeks.     GUERLINE Poe  11/19/17  7:32 AM           Electronically signed by GUERLINE Poe at 11/19/2017  7:41 AM      Sukhdev Ramirez MD at 11/20/2017  5:53 AM  Version 1 of 1           Progress Note   11/20/17      Subjective     Interval History:     Complaints: Resting this am no distress.  Doing fair with PT.  No dizziness, chest pain, no orthopnea.        Objective     Intake & Output (last day)       11/19 0701 - 11/20 0700    P.O. 1080    I.V. (mL/kg) 1000 (13.4)    Total Intake(mL/kg) 2080 (27.9)    Urine (mL/kg/hr) 550 (0.3)    Total Output 550    Net +1530         Unmeasured Urine Occurrence 5 x          Vital Signs  Temp:  [97.9 °F (36.6 °C)] 97.9 °F (36.6 °C)  Heart Rate:  [67-79] 79  Resp:  [16-18] 16  BP: (161-192)/(57-70) 192/70    Physical Exam:   General NAD   Lungs clear to auscultation, respirations regular and respirations unlabored   Heart regular rhythm & normal rate, normal S1, S2 and no murmur, no noemi   Abdomen normal bowel sounds, no masses, no hepatomegaly, soft non-tender   Extremities moves extremities well, no edema, no cyanosis and no redness    Labs:  Lab Results (last 72 hours)     Procedure Component Value Units  Date/Time    CBC & Differential [[id number]] Collected:  11/18/17 1125    Specimen:  Blood Updated:  11/18/17 1143    Narrative:       The following orders were created for panel order CBC & Differential.  Procedure                               Abnormality         Status                     ---------                               -----------         ------                     CBC Auto Differential[[id number]]        Abnormal            Final result                 Please view results for these tests on the individual orders.    CBC Auto Differential [[id number]]  (Abnormal) Collected:  11/18/17 1125    Specimen:  Blood from Arm, Right Updated:  11/18/17 1143     WBC 11.10 10*3/mm3      RBC 4.74 10*6/mm3      Hemoglobin 13.7 g/dL      Hematocrit 42.3 %      MCV 89.2 fL      MCH 28.9 pg      MCHC 32.4 (L) g/dL      RDW 13.3 %      RDW-SD 43.7 fl      MPV 11.3 (H) fL      Platelets 257 10*3/mm3      Neutrophil % 84.2 (H) %      Lymphocyte % 10.7 (L) %      Monocyte % 4.1 %      Eosinophil % 0.4 %      Basophil % 0.2 %      Immature Grans % 0.4 %      Neutrophils, Absolute 9.35 (H) 10*3/mm3      Lymphocytes, Absolute 1.19 10*3/mm3      Monocytes, Absolute 0.46 10*3/mm3      Eosinophils, Absolute 0.04 10*3/mm3      Basophils, Absolute 0.02 10*3/mm3      Immature Grans, Absolute 0.04 (H) 10*3/mm3     Comprehensive Metabolic Panel [[id number]]  (Abnormal) Collected:  11/18/17 1125    Specimen:  Blood from Arm, Right Updated:  11/18/17 1210     Glucose 114 (H) mg/dL      BUN 19 mg/dL      Creatinine 0.80 mg/dL      Sodium 137 mmol/L      Potassium 4.0 mmol/L      Chloride 107 mmol/L      CO2 23.9 (L) mmol/L      Calcium 9.4 mg/dL      Total Protein 6.9 g/dL      Albumin 4.40 g/dL      ALT (SGPT) 17 U/L      AST (SGOT) 25 U/L      Alkaline Phosphatase 77 U/L       Note New Reference Ranges        Total Bilirubin 0.5 mg/dL      eGFR Non African Amer 72 mL/min/1.73      Globulin 2.5 gm/dL      A/G Ratio 1.8 g/dL       BUN/Creatinine Ratio 23.8     Anion Gap 6.1 mmol/L     Osmolality, Calculated [[id number]]  (Normal) Collected:  11/18/17 1125    Specimen:  Blood from Arm, Right Updated:  11/18/17 1210     Osmolality Calc 276.9 mOsm/kg     aPTT [[id number]]  (Normal) Collected:  11/18/17 1328    Specimen:  Blood Updated:  11/18/17 1352     PTT 26.8 seconds       Note new Reference Range       Narrative:       PTT Heparin Therapeutic Range:  59 - 95 seconds    Protime-INR [[id number]]  (Normal) Collected:  11/18/17 1328    Specimen:  Blood Updated:  11/18/17 1352     Protime 12.8 Seconds       Note new Reference Range        INR 0.95    Narrative:       Suggested INR therapeutic range for stable oral anticoagulant therapy:    Low Intensity therapy:   1.5-2.0  Moderate Intensity therapy:   2.0-3.0  High Intensity therapy:   2.5-4.0    CBC & Differential [[id number]] Collected:  11/19/17 0052    Specimen:  Blood Updated:  11/19/17 0141    Narrative:       The following orders were created for panel order CBC & Differential.  Procedure                               Abnormality         Status                     ---------                               -----------         ------                     CBC Auto Differential[[id number]]        Abnormal            Final result                 Please view results for these tests on the individual orders.    CBC Auto Differential [[id number]]  (Abnormal) Collected:  11/19/17 0052    Specimen:  Blood Updated:  11/19/17 0141     WBC 8.90 10*3/mm3      RBC 4.05 (L) 10*6/mm3      Hemoglobin 11.7 (L) g/dL      Hematocrit 36.8 (L) %      MCV 90.9 fL      MCH 28.9 pg      MCHC 31.8 (L) g/dL      RDW 13.3 %      RDW-SD 43.2 fl      MPV 11.3 (H) fL      Platelets 247 10*3/mm3      Neutrophil % 81.6 (H) %      Lymphocyte % 10.9 (L) %      Monocyte % 7.3 %      Eosinophil % 0.0 %      Basophil % 0.1 %      Immature Grans % 0.1 %      Neutrophils, Absolute 7.26 (H) 10*3/mm3      Lymphocytes, Absolute 0.97 (L)  10*3/mm3      Monocytes, Absolute 0.65 10*3/mm3      Eosinophils, Absolute 0.00 10*3/mm3      Basophils, Absolute 0.01 10*3/mm3      Immature Grans, Absolute 0.01 10*3/mm3     Basic Metabolic Panel [[id number]]  (Abnormal) Collected:  11/19/17 0052    Specimen:  Blood Updated:  11/19/17 0155     Glucose 144 (H) mg/dL      BUN 14 mg/dL      Creatinine 0.82 mg/dL      Sodium 138 mmol/L      Potassium 4.5 mmol/L      Chloride 110 mmol/L      CO2 23.2 (L) mmol/L      Calcium 8.5 mg/dL      eGFR Non African Amer 70 mL/min/1.73      BUN/Creatinine Ratio 17.1     Anion Gap 4.8 mmol/L     Narrative:       GFR Normal >60  Chronic Kidney Disease <60  Kidney Failure <15    Osmolality, Calculated [[id number]]  (Normal) Collected:  11/19/17 0052    Specimen:  Blood Updated:  11/19/17 0155     Osmolality Calc 278.7 mOsm/kg     Blood Culture - Blood, [[id number]]  (Normal) Collected:  11/18/17 1910    Specimen:  Blood from Arm, Left Updated:  11/19/17 1946     Blood Culture No growth at 24 hours          Xray:  Imaging Results (last 24 hours)     Procedure Component Value Units Date/Time    FL Surgery Fluoro [[id number]] Collected:  11/19/17 0753     Updated:  11/19/17 0755    Narrative:       EXAMINATION: FL SURGERY FLUORO-      CLINICAL INDICATION:     fx; S72.321A-Displaced transverse fracture of  shaft of right femur, initial encounter for closed fracture     TECHNIQUE: Frontal view of the region of interest..        COMPARISON: NONE      FINDINGS: Fluoroscopy during surgery.       Impression:       As above     This report was finalized on 11/19/2017 7:53 AM by Dr. Rufus Washington MD.       XR Chest 1 View [[id number]] Collected:  11/19/17 0753     Updated:  11/19/17 0756    Narrative:       EXAMINATION: XR CHEST 1 VW-      CLINICAL INDICATION:     femur fracture; S72.321A-Displaced transverse  fracture of shaft of right femur, initial encounter for closed fracture     TECHNIQUE:  XR CHEST 1 VW-      COMPARISON: NONE      FINDINGS:    The lungs remain aerated.  Heart and mediastinum contours are unremarkable.  No pleural effusion.  No pneumothorax.   Bony and soft tissue structures are unremarkable.       Impression:       No radiographic evidence of acute cardiac or pulmonary  disease.     This report was finalized on 11/19/2017 7:54 AM by Dr. Rufus Washington MD.                Results Review:     I reviewed the patient's new clinical results.    Medication Review:   Hospital Medications (active)       Dose Frequency Start End    acetaminophen (TYLENOL) tablet 325 mg 325 mg Every 4 Hours PRN 11/18/2017     Sig - Route: Take 1 tablet by mouth Every 4 (Four) Hours As Needed for Fever (>101). - Oral    acetaminophen (TYLENOL) tablet 650 mg 650 mg Every 4 Hours PRN 11/18/2017     Sig - Route: Take 2 tablets by mouth Every 4 (Four) Hours As Needed for Mild Pain . - Oral    ceFAZolin (ANCEF) IVPB (duplex) 2 g 2 g Every 8 Hours 11/18/2017 11/19/2017    Sig - Route: Infuse 2,000 mg into a venous catheter Every 8 (Eight) Hours. - Intravenous    enoxaparin (LOVENOX) syringe 40 mg 40 mg Daily 11/19/2017     Sig - Route: Inject 0.4 mL under the skin Daily. - Subcutaneous    HYDROmorphone (DILAUDID) injection 0.5 mg 0.5 mg Every 3 Hours PRN 11/18/2017 11/28/2017    Sig - Route: Infuse 0.5 mL into a venous catheter Every 3 (Three) Hours As Needed for Moderate Pain  or Severe Pain . - Intravenous    lactated ringers infusion 125 mL/hr Continuous 11/18/2017     Sig - Route: Infuse 125 mL/hr into a venous catheter Continuous. - Intravenous    levothyroxine (SYNTHROID, LEVOTHROID) tablet 25 mcg 25 mcg Daily 11/19/2017     Sig - Route: Take 1 tablet by mouth Daily. - Oral    lisinopril (PRINIVIL,ZESTRIL) tablet 20 mg 20 mg Daily 11/19/2017     Sig - Route: Take 2 tablets by mouth Daily. - Oral    naproxen (NAPROSYN) tablet 250 mg 250 mg Daily 11/19/2017     Sig - Route: Take 1 tablet by mouth Daily. - Oral    oxyCODONE-acetaminophen (PERCOCET) 5-325 MG per tablet  1 tablet 1 tablet Every 6 Hours PRN 11/19/2017     Sig - Route: Take 1 tablet by mouth Every 6 (Six) Hours As Needed (pain). - Oral    sodium chloride 0.9 % infusion 125 mL/hr Continuous 11/18/2017     Sig - Route: Infuse 125 mL/hr into a venous catheter Continuous. - Intravenous    Cosign for Ordering: Accepted by Ramana Bass MD on 11/18/2017 11:32 AM    sodium chloride 0.9 % infusion 50 mL/hr Continuous 11/18/2017     Sig - Route: Infuse 50 mL/hr into a venous catheter Continuous. - Intravenous    oxyCODONE-acetaminophen (PERCOCET) 5-325 MG per tablet 1 tablet (Discontinued) 1 tablet Once As Needed 11/18/2017 11/19/2017    Sig - Route: Take 1 tablet by mouth 1 (One) Time As Needed (pain). - Oral          Assessment/Plan    1.Right Femur Fracture: POD #3 ORIF doing well.  Pt with a wheelchair bound  at home, she will need inpt therapy before she can go home.  Will consult inpt rehab today.     Sukhdev Ramirez MD  11/20/17  5:53 AM         Electronically signed by Sukhdev Ramirez MD at 11/20/2017  5:56 AM           Consult Notes (last 72 hours) (Notes from 11/17/2017 10:52 AM through 11/20/2017 10:52 AM)      GUERLINE Poe at 11/19/2017  7:43 AM  Version 1 of 1           Referring Provider: GUERLINE Poe  Reason for Consultation: right femur fracture    Patient Care Team:  Sukhdev Ramirez MD as PCP - General (Family Medicine)    Chief complaint right femur fracture    Subjective . 62 year old female s/p fall at work, seen in ED for a right femur fracture.     History of present illness: History of Present Illness 62 year old female s/p work related injury.  Patient states she tripped and fell due to a chair, injuring the right leg.      Review of Systems  Review of Systems   Constitutional: Negative for chills, diaphoresis and fever.   HENT: Negative.    Eyes: Negative.    Respiratory: Negative.    Cardiovascular: Negative.    Endocrine: Negative.    Genitourinary: Negative for  difficulty urinating and dysuria.   Musculoskeletal: Negative for neck pain and neck stiffness.        Endorses right leg pain.   Skin: Negative.    Allergic/Immunologic: Negative.    Neurological: Negative.    Hematological: Negative.    Psychiatric/Behavioral: Negative.         History  Past Medical History:   Diagnosis Date   • Disease of thyroid gland    • Hypertension  COPD    , Past Surgical History:   Procedure Laterality Date   • TONSILLECTOMY      Family history  Mother alive 80s no known medical disease  Father  Cancer of unknown type.   Social History:    Substance Use Topics   • Smoking status: Former Smoker 2ppd since Highschool,  Uses Vapor x 4 years.    • Smokeless tobacco: Never Used   • Alcohol use No   , Prescriptions Prior to Admission   Medication Sig Dispense Refill Last Dose   • levothyroxine (SYNTHROID, LEVOTHROID) 25 MCG tablet Take 25 mcg by mouth Daily.   2017 at AM   • lisinopril (PRINIVIL,ZESTRIL) 20 MG tablet Take 20 mg by mouth Daily.   2017 at AM   • naproxen sodium (ALEVE) 220 MG tablet Take 220 mg by mouth Daily.   2017 at AM   , Scheduled Meds:    ceFAZolin 2 g Intravenous Q8H   enoxaparin 40 mg Subcutaneous Daily   levothyroxine 25 mcg Oral Daily   lisinopril 20 mg Oral Daily   naproxen 250 mg Oral Daily   , Continuous Infusions:    lactated ringers 125 mL/hr    sodium chloride 125 mL/hr Last Rate: 125 mL/hr (17 1138)   sodium chloride 50 mL/hr Last Rate: 50 mL/hr (17 1414)   , PRN Meds:  •  acetaminophen  •  acetaminophen  •  HYDROmorphone  •  oxyCODONE-acetaminophen and Allergies:  Review of patient's allergies indicates no known allergies.    Objective      Vital Signs   Temp:  [97.6 °F (36.4 °C)-99.5 °F (37.5 °C)] 97.9 °F (36.6 °C)  Heart Rate:  [61-87] 67  Resp:  [9-24] 18  BP: (130-183)/(53-90) 161/57    Physical Exam:   Physical Exam   Constitutional: She is oriented to person, place, and time. She appears well-developed and  well-nourished. No distress.   HENT:   Head: Normocephalic.   Eyes: Pupils are equal, round, and reactive to light.   Neck: Normal range of motion.   Cardiovascular: Normal rate.    Pulmonary/Chest: Effort normal and breath sounds normal.   Abdominal: Soft. Bowel sounds are normal.   Musculoskeletal: She exhibits tenderness.   Right femur pain with motion.  Dressing CDI currently.   Neurological: She is alert and oriented to person, place, and time.   Skin: Skin is warm and dry. She is not diaphoretic.   Psychiatric: She has a normal mood and affect. Her behavior is normal.   Vitals reviewed.      Results Review:   I reviewed the patient's new clinical results.  X ray:  Displaced transverse right femur shaft fracture.     Assessment/Plan   Patient previously seen and examined per Dr Cifuentes, recommendation for traction and surgical intervention for right femur ORIF scheduled for 11/18/17.     Principal Problem:    Closed displaced transverse fracture of shaft of right femur  Active Problems:    Femur fracture, right        GUERLINE Poe  11/19/17  7:43 AM             Electronically signed by GUERLINE Poe at 11/19/2017  7:54 AM

## 2017-11-20 NOTE — PROGRESS NOTES
LOS: 2 days   Patient Care Team:  Sukhdev Ramirez MD as PCP - General (Family Medicine)    Post Op Day 2      Procedure(s):  FEMUR OPEN REDUCTION INTERNAL FIXATION     Subjective     Interval History:     Patient Complaints: none  Patient Denies:  N/v fever or chills  History taken from: patient    Review of Systems:    All systems were reviewed and negative except for:  Musculoskeletal: positive for  bone pain and joint pain    Objective     Vital Signs  Temp:  [97.8 °F (36.6 °C)] 97.8 °F (36.6 °C)  Heart Rate:  [74-79] 75  Resp:  [16] 16  BP: (180-192)/(61-70) 189/69    Physical Exam:   General Appearance: alert, appears stated age and cooperative  Lungs: clear to auscultation, respirations regular, respirations even and respirations unlabored  Heart: regular rhythm & normal rate, normal S1, S2, no murmur, no noemi, no rub and no click  Abdomen: normal bowel sounds, no masses, no hepatomegaly, no splenomegaly, soft non-tender, no guarding and no rebound tenderness  Extremities: edema trace lower right . Dressing cdi. Positive pedal pulses distally. Active dorsiflexion and plantarflexion of the toes.   Results Review:     I reviewed the patient's new clinical results.    Medication Review: yes    Assessment/Plan     Principal Problem:    Closed displaced transverse fracture of shaft of right femur  Active Problems:    Femur fracture, right      Plan: Continue post op care as written. Encourage mobilization. Continue DVT prophylaxis. Follow up with Dr. Cifuentes in 2 weeks.     Plan for disposition:Where: home and SNF    Betty Truong, APRN  11/20/17  6:25 PM      Time: 15 mins

## 2017-11-21 PROCEDURE — 97110 THERAPEUTIC EXERCISES: CPT

## 2017-11-21 PROCEDURE — G8987 SELF CARE CURRENT STATUS: HCPCS

## 2017-11-21 PROCEDURE — G8988 SELF CARE GOAL STATUS: HCPCS

## 2017-11-21 PROCEDURE — 97116 GAIT TRAINING THERAPY: CPT

## 2017-11-21 PROCEDURE — 25010000002 ONDANSETRON PER 1 MG: Performed by: FAMILY MEDICINE

## 2017-11-21 PROCEDURE — 25010000002 HYDROMORPHONE PER 4 MG: Performed by: FAMILY MEDICINE

## 2017-11-21 PROCEDURE — 25010000002 ENOXAPARIN PER 10 MG: Performed by: ORTHOPAEDIC SURGERY

## 2017-11-21 PROCEDURE — 97530 THERAPEUTIC ACTIVITIES: CPT

## 2017-11-21 PROCEDURE — 97167 OT EVAL HIGH COMPLEX 60 MIN: CPT

## 2017-11-21 PROCEDURE — 94799 UNLISTED PULMONARY SVC/PX: CPT

## 2017-11-21 RX ORDER — ONDANSETRON 2 MG/ML
4 INJECTION INTRAMUSCULAR; INTRAVENOUS EVERY 8 HOURS PRN
Status: DISCONTINUED | OUTPATIENT
Start: 2017-11-21 | End: 2017-11-24 | Stop reason: HOSPADM

## 2017-11-21 RX ADMIN — OXYCODONE HYDROCHLORIDE AND ACETAMINOPHEN 1 TABLET: 5; 325 TABLET ORAL at 20:29

## 2017-11-21 RX ADMIN — ONDANSETRON 4 MG: 2 INJECTION, SOLUTION INTRAMUSCULAR; INTRAVENOUS at 11:24

## 2017-11-21 RX ADMIN — HYDROMORPHONE HYDROCHLORIDE 0.5 MG: 1 INJECTION, SOLUTION INTRAMUSCULAR; INTRAVENOUS; SUBCUTANEOUS at 09:15

## 2017-11-21 RX ADMIN — LISINOPRIL 20 MG: 10 TABLET ORAL at 14:44

## 2017-11-21 RX ADMIN — SODIUM CHLORIDE 50 ML/HR: 9 INJECTION, SOLUTION INTRAVENOUS at 13:45

## 2017-11-21 RX ADMIN — NAPROXEN 250 MG: 250 TABLET ORAL at 14:44

## 2017-11-21 RX ADMIN — LEVOTHYROXINE SODIUM 25 MCG: 75 TABLET ORAL at 18:12

## 2017-11-21 RX ADMIN — OXYCODONE HYDROCHLORIDE AND ACETAMINOPHEN 1 TABLET: 5; 325 TABLET ORAL at 14:42

## 2017-11-21 RX ADMIN — OXYCODONE HYDROCHLORIDE AND ACETAMINOPHEN 1 TABLET: 5; 325 TABLET ORAL at 03:42

## 2017-11-21 RX ADMIN — ENOXAPARIN SODIUM 40 MG: 40 INJECTION SUBCUTANEOUS at 09:00

## 2017-11-21 NOTE — PROGRESS NOTES
Discharge Planning Assessment  [facility]     Patient Name: Gilda Galdamez  MRN: [id number]  Today's Date: 11/21/2017    Admit Date: 11/18/2017       Discharge Plan       11/21/17 1109    Case Management/Social Work Plan    Plan SS contacted [facility] acute rehab per Brenda who states pt will be reviewed. SS to follow.             Expected Discharge Date and Time     Expected Discharge Date Expected Discharge Time    Nov 22, 2017         Marlene Glasgow

## 2017-11-21 NOTE — PLAN OF CARE
Problem: Pain, Chronic (Adult)  Goal: Identify Related Risk Factors and Signs and Symptoms  Outcome: Ongoing (interventions implemented as appropriate)  Goal: Acceptable Pain Control/Comfort Level  Outcome: Ongoing (interventions implemented as appropriate)    Problem: Activity Intolerance (Adult)  Goal: Identify Related Risk Factors and Signs and Symptoms  Outcome: Ongoing (interventions implemented as appropriate)  Goal: Activity Tolerance  Outcome: Ongoing (interventions implemented as appropriate)  Goal: Effective Energy Conservation Techniques  Outcome: Ongoing (interventions implemented as appropriate)    Problem: Patient Care Overview (Adult)  Goal: Plan of Care Review  Outcome: Ongoing (interventions implemented as appropriate)

## 2017-11-21 NOTE — PAYOR COMM NOTE
"[facility]   NIKI JARAMILLO  PHONE  [phone]  FAX  [phone]    CLINICAL UPDATE    Chetna Hsu (62 y.o. Female)     Date of Birth Social Security Number Address Home Phone MRN    1955  [address] [place], [place] [id number] [phone] [id number]    Ad[employer]ism Marital Status          Cheondoism        Admission Date Admission Type Admitting Provider Attending Provider Department, Room/Bed    11/18/17 Emergency Sukhdev Ramirez MD Perry, Kelvin D, MD [address], 3330/1P    Discharge Date Discharge Disposition Discharge Destination                      Attending Provider: Sukhdev Ramirez MD     Allergies:  No Known Allergies    Isolation:  None   Infection:  None   Code Status:  FULL    Ht:  67\" (170.2 cm)   Wt:  164 lb 7 oz (74.6 kg)    Admission Cmt:  None   Principal Problem:  Closed displaced transverse fracture of shaft of right femur [S72.321A] More...                 Active Insurance as of 11/18/2017     Primary Coverage     Payor Plan Insurance Group Employer/Plan Group    WORKERS COMPENSATION MISC WORKERS COMPENSATION      Coverage Address Coverage Phone Number Effective From Effective To    [address] [address] [phone] 11/18/2017     [place] [address]       Subscriber Name Subscriber Birth Date Member ID       CHETNA HSU [id number]                 Emergency Contacts      (Rel.) Home Phone Work Phone Mobile Phone    George Hsu (Friend) [phone] -- --            Hospital Medications (active)       Dose Frequency Start End    acetaminophen (TYLENOL) tablet 325 mg 325 mg Every 4 Hours PRN 11/18/2017     Sig - Route: Take 1 tablet by mouth Every 4 (Four) Hours As Needed for Fever (>101). - Oral    acetaminophen (TYLENOL) tablet 650 mg 650 mg Every 4 Hours PRN 11/18/2017     Sig - Route: Take 2 tablets by mouth Every 4 (Four) Hours As Needed for Mild Pain . - Oral    enoxaparin (LOVENOX) syringe 40 mg 40 mg Daily 11/19/2017     Sig - " Route: Inject 0.4 mL under the skin Daily. - Subcutaneous    HYDROmorphone (DILAUDID) injection 0.5 mg 0.5 mg Every 3 Hours PRN 11/18/2017 11/28/2017    Sig - Route: Infuse 0.5 mL into a venous catheter Every 3 (Three) Hours As Needed for Moderate Pain  or Severe Pain . - Intravenous    lactated ringers infusion 125 mL/hr Continuous 11/18/2017     Sig - Route: Infuse 125 mL/hr into a venous catheter Continuous. - Intravenous    levothyroxine (SYNTHROID, LEVOTHROID) tablet 25 mcg 25 mcg Daily 11/19/2017     Sig - Route: Take 1 tablet by mouth Daily. - Oral    lisinopril (PRINIVIL,ZESTRIL) tablet 20 mg 20 mg Daily 11/19/2017     Sig - Route: Take 2 tablets by mouth Daily. - Oral    naproxen (NAPROSYN) tablet 250 mg 250 mg Daily 11/19/2017     Sig - Route: Take 1 tablet by mouth Daily. - Oral    oxyCODONE-acetaminophen (PERCOCET) 5-325 MG per tablet 1 tablet 1 tablet Every 6 Hours PRN 11/19/2017     Sig - Route: Take 1 tablet by mouth Every 6 (Six) Hours As Needed (pain). - Oral    sodium chloride 0.9 % infusion 125 mL/hr Continuous 11/18/2017     Sig - Route: Infuse 125 mL/hr into a venous catheter Continuous. - Intravenous    Cosign for Ordering: Accepted by Ramana Bass MD on 11/18/2017 11:32 AM    sodium chloride 0.9 % infusion 50 mL/hr Continuous 11/18/2017     Sig - Route: Infuse 50 mL/hr into a venous catheter Continuous. - Intravenous             Physician Progress Notes (last 24 hours) (Notes from 11/20/2017 10:39 AM through 11/21/2017 10:39 AM)      Sukhdev Ramirez MD at 11/21/2017  5:15 AM  Version 1 of 1           Progress Note   11/21/17      Subjective     Interval History:     Complaints: Resting no distress, working with PT.  No fevers no chills.        Objective     Intake & Output (last day)       11/20 0701 - 11/21 0700    P.O. 1180    I.V. (mL/kg) 975 (13.1)    Total Intake(mL/kg) 2155 (28.9)    Net +2155         Unmeasured Urine Occurrence 4 x          Vital Signs  Temp:  [97.8 °F (36.6  °C)-98.6 °F (37 °C)] 98.6 °F (37 °C)  Heart Rate:  [64-80] 64  Resp:  [16] 16  BP: (148-189)/(52-69) 156/59    Physical Exam:   General Appearance alert, appears stated age and cooperative   Lungs clear to auscultation, respirations regular and respirations unlabored   Heart regular rhythm & normal rate, normal S1, S2 and no murmur, no noemi   Abdomen normal bowel sounds, no masses, no hepatomegaly, soft non-tender   Extremities No edema    Labs:  Lab Results (last 72 hours)     Procedure Component Value Units Date/Time    CBC & Differential [[id number]] Collected:  11/18/17 1125    Specimen:  Blood Updated:  11/18/17 1143    Narrative:       The following orders were created for panel order CBC & Differential.  Procedure                               Abnormality         Status                     ---------                               -----------         ------                     CBC Auto Differential[[id number]]        Abnormal            Final result                 Please view results for these tests on the individual orders.    CBC Auto Differential [[id number]]  (Abnormal) Collected:  11/18/17 1125    Specimen:  Blood from Arm, Right Updated:  11/18/17 1143     WBC 11.10 10*3/mm3      RBC 4.74 10*6/mm3      Hemoglobin 13.7 g/dL      Hematocrit 42.3 %      MCV 89.2 fL      MCH 28.9 pg      MCHC 32.4 (L) g/dL      RDW 13.3 %      RDW-SD 43.7 fl      MPV 11.3 (H) fL      Platelets 257 10*3/mm3      Neutrophil % 84.2 (H) %      Lymphocyte % 10.7 (L) %      Monocyte % 4.1 %      Eosinophil % 0.4 %      Basophil % 0.2 %      Immature Grans % 0.4 %      Neutrophils, Absolute 9.35 (H) 10*3/mm3      Lymphocytes, Absolute 1.19 10*3/mm3      Monocytes, Absolute 0.46 10*3/mm3      Eosinophils, Absolute 0.04 10*3/mm3      Basophils, Absolute 0.02 10*3/mm3      Immature Grans, Absolute 0.04 (H) 10*3/mm3     Comprehensive Metabolic Panel [[id number]]  (Abnormal) Collected:  11/18/17 1125    Specimen:  Blood from Arm, Right  Updated:  11/18/17 1210     Glucose 114 (H) mg/dL      BUN 19 mg/dL      Creatinine 0.80 mg/dL      Sodium 137 mmol/L      Potassium 4.0 mmol/L      Chloride 107 mmol/L      CO2 23.9 (L) mmol/L      Calcium 9.4 mg/dL      Total Protein 6.9 g/dL      Albumin 4.40 g/dL      ALT (SGPT) 17 U/L      AST (SGOT) 25 U/L      Alkaline Phosphatase 77 U/L       Note New Reference Ranges        Total Bilirubin 0.5 mg/dL      eGFR Non African Amer 72 mL/min/1.73      Globulin 2.5 gm/dL      A/G Ratio 1.8 g/dL      BUN/Creatinine Ratio 23.8     Anion Gap 6.1 mmol/L     Osmolality, Calculated [[id number]]  (Normal) Collected:  11/18/17 1125    Specimen:  Blood from Arm, Right Updated:  11/18/17 1210     Osmolality Calc 276.9 mOsm/kg     aPTT [[id number]]  (Normal) Collected:  11/18/17 1328    Specimen:  Blood Updated:  11/18/17 1352     PTT 26.8 seconds       Note new Reference Range       Narrative:       PTT Heparin Therapeutic Range:  59 - 95 seconds    Protime-INR [[id number]]  (Normal) Collected:  11/18/17 1328    Specimen:  Blood Updated:  11/18/17 1352     Protime 12.8 Seconds       Note new Reference Range        INR 0.95    Narrative:       Suggested INR therapeutic range for stable oral anticoagulant therapy:    Low Intensity therapy:   1.5-2.0  Moderate Intensity therapy:   2.0-3.0  High Intensity therapy:   2.5-4.0    CBC & Differential [[id number]] Collected:  11/19/17 0052    Specimen:  Blood Updated:  11/19/17 0141    Narrative:       The following orders were created for panel order CBC & Differential.  Procedure                               Abnormality         Status                     ---------                               -----------         ------                     CBC Auto Differential[[id number]]        Abnormal            Final result                 Please view results for these tests on the individual orders.    CBC Auto Differential [[id number]]  (Abnormal) Collected:  11/19/17 0052    Specimen:  Blood  Updated:  11/19/17 0141     WBC 8.90 10*3/mm3      RBC 4.05 (L) 10*6/mm3      Hemoglobin 11.7 (L) g/dL      Hematocrit 36.8 (L) %      MCV 90.9 fL      MCH 28.9 pg      MCHC 31.8 (L) g/dL      RDW 13.3 %      RDW-SD 43.2 fl      MPV 11.3 (H) fL      Platelets 247 10*3/mm3      Neutrophil % 81.6 (H) %      Lymphocyte % 10.9 (L) %      Monocyte % 7.3 %      Eosinophil % 0.0 %      Basophil % 0.1 %      Immature Grans % 0.1 %      Neutrophils, Absolute 7.26 (H) 10*3/mm3      Lymphocytes, Absolute 0.97 (L) 10*3/mm3      Monocytes, Absolute 0.65 10*3/mm3      Eosinophils, Absolute 0.00 10*3/mm3      Basophils, Absolute 0.01 10*3/mm3      Immature Grans, Absolute 0.01 10*3/mm3     Basic Metabolic Panel [[id number]]  (Abnormal) Collected:  11/19/17 0052    Specimen:  Blood Updated:  11/19/17 0155     Glucose 144 (H) mg/dL      BUN 14 mg/dL      Creatinine 0.82 mg/dL      Sodium 138 mmol/L      Potassium 4.5 mmol/L      Chloride 110 mmol/L      CO2 23.2 (L) mmol/L      Calcium 8.5 mg/dL      eGFR Non African Amer 70 mL/min/1.73      BUN/Creatinine Ratio 17.1     Anion Gap 4.8 mmol/L     Narrative:       GFR Normal >60  Chronic Kidney Disease <60  Kidney Failure <15    Osmolality, Calculated [[id number]]  (Normal) Collected:  11/19/17 0052    Specimen:  Blood Updated:  11/19/17 0155     Osmolality Calc 278.7 mOsm/kg     Blood Culture - Blood, [[id number]]  (Normal) Collected:  11/18/17 1910    Specimen:  Blood from Arm, Left Updated:  11/20/17 1946     Blood Culture No growth at 2 days          Xray:  Imaging Results (last 24 hours)     ** No results found for the last 24 hours. **             Results Review:     I reviewed the patient's new clinical results.    Medication Review:   Hospital Medications (active)       Dose Frequency Start End    acetaminophen (TYLENOL) tablet 325 mg 325 mg Every 4 Hours PRN 11/18/2017     Sig - Route: Take 1 tablet by mouth Every 4 (Four) Hours As Needed for Fever (>101). - Oral     acetaminophen (TYLENOL) tablet 650 mg 650 mg Every 4 Hours PRN 11/18/2017     Sig - Route: Take 2 tablets by mouth Every 4 (Four) Hours As Needed for Mild Pain . - Oral    enoxaparin (LOVENOX) syringe 40 mg 40 mg Daily 11/19/2017     Sig - Route: Inject 0.4 mL under the skin Daily. - Subcutaneous    HYDROmorphone (DILAUDID) injection 0.5 mg 0.5 mg Every 3 Hours PRN 11/18/2017 11/28/2017    Sig - Route: Infuse 0.5 mL into a venous catheter Every 3 (Three) Hours As Needed for Moderate Pain  or Severe Pain . - Intravenous    lactated ringers infusion 125 mL/hr Continuous 11/18/2017     Sig - Route: Infuse 125 mL/hr into a venous catheter Continuous. - Intravenous    levothyroxine (SYNTHROID, LEVOTHROID) tablet 25 mcg 25 mcg Daily 11/19/2017     Sig - Route: Take 1 tablet by mouth Daily. - Oral    lisinopril (PRINIVIL,ZESTRIL) tablet 20 mg 20 mg Daily 11/19/2017     Sig - Route: Take 2 tablets by mouth Daily. - Oral    naproxen (NAPROSYN) tablet 250 mg 250 mg Daily 11/19/2017     Sig - Route: Take 1 tablet by mouth Daily. - Oral    oxyCODONE-acetaminophen (PERCOCET) 5-325 MG per tablet 1 tablet 1 tablet Every 6 Hours PRN 11/19/2017     Sig - Route: Take 1 tablet by mouth Every 6 (Six) Hours As Needed (pain). - Oral    sodium chloride 0.9 % infusion 125 mL/hr Continuous 11/18/2017     Sig - Route: Infuse 125 mL/hr into a venous catheter Continuous. - Intravenous    Cosign for Ordering: Accepted by Ramana Bass MD on 11/18/2017 11:32 AM    sodium chloride 0.9 % infusion 50 mL/hr Continuous 11/18/2017     Sig - Route: Infuse 50 mL/hr into a venous catheter Continuous. - Intravenous          Assessment/Plan    1.Right Femur Fracture: POD #4 ORIF doing well.   ?inpt rehab who is in process of evaluation.  On lovenox  2.HTN: elevated some this am monitor due to pt in pain when checked      Sukhdev Ramirez MD  11/21/17  5:15 AM         Electronically signed by Sukhdev Ramirez MD at 11/21/2017  5:17 AM        Consult  Notes (last 24 hours) (Notes from 11/20/2017 10:39 AM through 11/21/2017 10:39 AM)     No notes of this type exist for this encounter.

## 2017-11-21 NOTE — PLAN OF CARE
Problem: Pain, Chronic (Adult)  Goal: Identify Related Risk Factors and Signs and Symptoms  Outcome: Ongoing (interventions implemented as appropriate)  Goal: Acceptable Pain Control/Comfort Level  Outcome: Ongoing (interventions implemented as appropriate)    Problem: Patient Care Overview (Adult)  Goal: Plan of Care Review  Outcome: Ongoing (interventions implemented as appropriate)    Problem: Fall Risk (Adult)  Goal: Identify Related Risk Factors and Signs and Symptoms  Outcome: Ongoing (interventions implemented as appropriate)  Goal: Absence of Falls  Outcome: Ongoing (interventions implemented as appropriate)    Problem: Skin Integrity Impairment, Risk/Actual (Adult)  Goal: Identify Related Risk Factors and Signs and Symptoms  Outcome: Ongoing (interventions implemented as appropriate)  Goal: Skin Integrity/Wound Healing  Outcome: Ongoing (interventions implemented as appropriate)

## 2017-11-21 NOTE — PROGRESS NOTES
Progress Note   11/21/17      Subjective     Interval History:     Complaints: Resting no distress, working with PT.  No fevers no chills.        Objective     Intake & Output (last day)       11/20 0701 - 11/21 0700    P.O. 1180    I.V. (mL/kg) 975 (13.1)    Total Intake(mL/kg) 2155 (28.9)    Net +2155         Unmeasured Urine Occurrence 4 x          Vital Signs  Temp:  [97.8 °F (36.6 °C)-98.6 °F (37 °C)] 98.6 °F (37 °C)  Heart Rate:  [64-80] 64  Resp:  [16] 16  BP: (148-189)/(52-69) 156/59    Physical Exam:   General Appearance alert, appears stated age and cooperative   Lungs clear to auscultation, respirations regular and respirations unlabored   Heart regular rhythm & normal rate, normal S1, S2 and no murmur, no noemi   Abdomen normal bowel sounds, no masses, no hepatomegaly, soft non-tender   Extremities No edema    Labs:  Lab Results (last 72 hours)     Procedure Component Value Units Date/Time    CBC & Differential [[id number]] Collected:  11/18/17 1125    Specimen:  Blood Updated:  11/18/17 1143    Narrative:       The following orders were created for panel order CBC & Differential.  Procedure                               Abnormality         Status                     ---------                               -----------         ------                     CBC Auto Differential[[id number]]        Abnormal            Final result                 Please view results for these tests on the individual orders.    CBC Auto Differential [[id number]]  (Abnormal) Collected:  11/18/17 1125    Specimen:  Blood from Arm, Right Updated:  11/18/17 1143     WBC 11.10 10*3/mm3      RBC 4.74 10*6/mm3      Hemoglobin 13.7 g/dL      Hematocrit 42.3 %      MCV 89.2 fL      MCH 28.9 pg      MCHC 32.4 (L) g/dL      RDW 13.3 %      RDW-SD 43.7 fl      MPV 11.3 (H) fL      Platelets 257 10*3/mm3      Neutrophil % 84.2 (H) %      Lymphocyte % 10.7 (L) %      Monocyte % 4.1 %      Eosinophil % 0.4 %      Basophil % 0.2 %       Immature Grans % 0.4 %      Neutrophils, Absolute 9.35 (H) 10*3/mm3      Lymphocytes, Absolute 1.19 10*3/mm3      Monocytes, Absolute 0.46 10*3/mm3      Eosinophils, Absolute 0.04 10*3/mm3      Basophils, Absolute 0.02 10*3/mm3      Immature Grans, Absolute 0.04 (H) 10*3/mm3     Comprehensive Metabolic Panel [[id number]]  (Abnormal) Collected:  11/18/17 1125    Specimen:  Blood from Arm, Right Updated:  11/18/17 1210     Glucose 114 (H) mg/dL      BUN 19 mg/dL      Creatinine 0.80 mg/dL      Sodium 137 mmol/L      Potassium 4.0 mmol/L      Chloride 107 mmol/L      CO2 23.9 (L) mmol/L      Calcium 9.4 mg/dL      Total Protein 6.9 g/dL      Albumin 4.40 g/dL      ALT (SGPT) 17 U/L      AST (SGOT) 25 U/L      Alkaline Phosphatase 77 U/L       Note New Reference Ranges        Total Bilirubin 0.5 mg/dL      eGFR Non African Amer 72 mL/min/1.73      Globulin 2.5 gm/dL      A/G Ratio 1.8 g/dL      BUN/Creatinine Ratio 23.8     Anion Gap 6.1 mmol/L     Osmolality, Calculated [[id number]]  (Normal) Collected:  11/18/17 1125    Specimen:  Blood from Arm, Right Updated:  11/18/17 1210     Osmolality Calc 276.9 mOsm/kg     aPTT [[id number]]  (Normal) Collected:  11/18/17 1328    Specimen:  Blood Updated:  11/18/17 1352     PTT 26.8 seconds       Note new Reference Range       Narrative:       PTT Heparin Therapeutic Range:  59 - 95 seconds    Protime-INR [[id number]]  (Normal) Collected:  11/18/17 1328    Specimen:  Blood Updated:  11/18/17 1352     Protime 12.8 Seconds       Note new Reference Range        INR 0.95    Narrative:       Suggested INR therapeutic range for stable oral anticoagulant therapy:    Low Intensity therapy:   1.5-2.0  Moderate Intensity therapy:   2.0-3.0  High Intensity therapy:   2.5-4.0    CBC & Differential [[id number]] Collected:  11/19/17 0052    Specimen:  Blood Updated:  11/19/17 0141    Narrative:       The following orders were created for panel order CBC & Differential.  Procedure                                Abnormality         Status                     ---------                               -----------         ------                     CBC Auto Differential[[id number]]        Abnormal            Final result                 Please view results for these tests on the individual orders.    CBC Auto Differential [[id number]]  (Abnormal) Collected:  11/19/17 0052    Specimen:  Blood Updated:  11/19/17 0141     WBC 8.90 10*3/mm3      RBC 4.05 (L) 10*6/mm3      Hemoglobin 11.7 (L) g/dL      Hematocrit 36.8 (L) %      MCV 90.9 fL      MCH 28.9 pg      MCHC 31.8 (L) g/dL      RDW 13.3 %      RDW-SD 43.2 fl      MPV 11.3 (H) fL      Platelets 247 10*3/mm3      Neutrophil % 81.6 (H) %      Lymphocyte % 10.9 (L) %      Monocyte % 7.3 %      Eosinophil % 0.0 %      Basophil % 0.1 %      Immature Grans % 0.1 %      Neutrophils, Absolute 7.26 (H) 10*3/mm3      Lymphocytes, Absolute 0.97 (L) 10*3/mm3      Monocytes, Absolute 0.65 10*3/mm3      Eosinophils, Absolute 0.00 10*3/mm3      Basophils, Absolute 0.01 10*3/mm3      Immature Grans, Absolute 0.01 10*3/mm3     Basic Metabolic Panel [[id number]]  (Abnormal) Collected:  11/19/17 0052    Specimen:  Blood Updated:  11/19/17 0155     Glucose 144 (H) mg/dL      BUN 14 mg/dL      Creatinine 0.82 mg/dL      Sodium 138 mmol/L      Potassium 4.5 mmol/L      Chloride 110 mmol/L      CO2 23.2 (L) mmol/L      Calcium 8.5 mg/dL      eGFR Non African Amer 70 mL/min/1.73      BUN/Creatinine Ratio 17.1     Anion Gap 4.8 mmol/L     Narrative:       GFR Normal >60  Chronic Kidney Disease <60  Kidney Failure <15    Osmolality, Calculated [[id number]]  (Normal) Collected:  11/19/17 0052    Specimen:  Blood Updated:  11/19/17 0155     Osmolality Calc 278.7 mOsm/kg     Blood Culture - Blood, [[id number]]  (Normal) Collected:  11/18/17 1910    Specimen:  Blood from Arm, Left Updated:  11/20/17 1946     Blood Culture No growth at 2 days          Xray:  Imaging Results (last 24 hours)     ** No  results found for the last 24 hours. **             Results Review:     I reviewed the patient's new clinical results.    Medication Review:   Hospital Medications (active)       Dose Frequency Start End    acetaminophen (TYLENOL) tablet 325 mg 325 mg Every 4 Hours PRN 11/18/2017     Sig - Route: Take 1 tablet by mouth Every 4 (Four) Hours As Needed for Fever (>101). - Oral    acetaminophen (TYLENOL) tablet 650 mg 650 mg Every 4 Hours PRN 11/18/2017     Sig - Route: Take 2 tablets by mouth Every 4 (Four) Hours As Needed for Mild Pain . - Oral    enoxaparin (LOVENOX) syringe 40 mg 40 mg Daily 11/19/2017     Sig - Route: Inject 0.4 mL under the skin Daily. - Subcutaneous    HYDROmorphone (DILAUDID) injection 0.5 mg 0.5 mg Every 3 Hours PRN 11/18/2017 11/28/2017    Sig - Route: Infuse 0.5 mL into a venous catheter Every 3 (Three) Hours As Needed for Moderate Pain  or Severe Pain . - Intravenous    lactated ringers infusion 125 mL/hr Continuous 11/18/2017     Sig - Route: Infuse 125 mL/hr into a venous catheter Continuous. - Intravenous    levothyroxine (SYNTHROID, LEVOTHROID) tablet 25 mcg 25 mcg Daily 11/19/2017     Sig - Route: Take 1 tablet by mouth Daily. - Oral    lisinopril (PRINIVIL,ZESTRIL) tablet 20 mg 20 mg Daily 11/19/2017     Sig - Route: Take 2 tablets by mouth Daily. - Oral    naproxen (NAPROSYN) tablet 250 mg 250 mg Daily 11/19/2017     Sig - Route: Take 1 tablet by mouth Daily. - Oral    oxyCODONE-acetaminophen (PERCOCET) 5-325 MG per tablet 1 tablet 1 tablet Every 6 Hours PRN 11/19/2017     Sig - Route: Take 1 tablet by mouth Every 6 (Six) Hours As Needed (pain). - Oral    sodium chloride 0.9 % infusion 125 mL/hr Continuous 11/18/2017     Sig - Route: Infuse 125 mL/hr into a venous catheter Continuous. - Intravenous    Cosign for Ordering: Accepted by Ramana Bass MD on 11/18/2017 11:32 AM    sodium chloride 0.9 % infusion 50 mL/hr Continuous 11/18/2017     Sig - Route: Infuse 50 mL/hr into a  venous catheter Continuous. - Intravenous          Assessment/Plan    1.Right Femur Fracture: POD #4 ORIF doing well.  ?inpt rehab who is in process of evaluation.  On lovenox  2.HTN: elevated some this am monitor due to pt in pain when checked      Sukhdev Ramirez MD  11/21/17  5:15 AM

## 2017-11-21 NOTE — THERAPY TREATMENT NOTE
Acute Care - Physical Therapy Treatment Note    K[facility]     Patient Name: Gilda Galdamez  : 1955  MRN: [id number]  Today's Date: 2017  Onset of Illness/Injury or Date of Surgery Date: 17 (admit date)  Date of Referral to PT: 17  Referring Physician: Vane    Admit Date: 2017    Visit Dx:    ICD-10-CM ICD-9-CM   1. Closed displaced transverse fracture of shaft of right femur, initial encounter S72.321A 821.01     Patient Active Problem List   Diagnosis   • Femur fracture, right   • Closed displaced transverse fracture of shaft of right femur               Adult Rehabilitation Note       17 1702          Rehab Assessment/Intervention    Discipline physical therapist  -CT      Document Type therapy note (daily note)  -CT      Subjective Information agree to therapy  -CT      Patient Effort, Rehab Treatment good  -CT      Precautions/Limitations fall precautions   WBAT RLE  -CT      Patient Response to Treatment Pt tolerated both AM and PM treatment sessions well with rest breaks provided as needed.   -CT      Recorded by [CT] Melani Estrada PT      Pain Assessment    Pain Assessment 0-10  -CT      Pain Score 2  -CT      Pain Location Leg  -CT      Pain Orientation Right  -CT      Recorded by [CT] Melani Estrada PT      Cognitive Assessment/Intervention    Current Cognitive/Communication Assessment functional  -CT      Orientation Status oriented x 4  -CT      Personal Safety Interventions fall prevention program maintained;gait belt;muscle strengthening facilitated;nonskid shoes/slippers when out of bed  -CT      Recorded by [CT] Melani Estrada PT      Mobility Assessment/Training    Right Lower Extremity Weight-Bearing weight-bearing as tolerated   per MD orders  -CT      Recorded by [CT] Melani Estrada PT      Bed Mobility, Assessment/Treatment    Bed Mobility, Assistive Device bed rails  -CT      Bed Mobility, Roll Left, [place] --  -CT      Bed Mobility, Roll Right,  [place] --  -CT      Bed Mobility, Scoot/Bridge, [place] contact guard assist  -CT      Bed Mob, Supine to Sit, [place] contact guard assist  -CT      Bed Mob, Sit to Supine, [place] --  -CT      Bed Mobility, Impairments strength decreased;impaired balance  -CT      Recorded by [CT] Melani Estrada, DREW      Transfer Assessment/Treatment    Transfers, Bed-Chair-Bed, Assist Device --  -CT      Transfers, Sit-Stand [place] contact guard assist  -CT      Transfers, Stand-Sit [place] contact guard assist  -CT      Transfers, Sit-Stand-Sit, Assist Device rolling walker  -CT      Recorded by [CT] Melani Estrada, PT      Gait Assessment/Treatment    Gait, [place] Level contact guard assist  -CT      Gait, Assistive Device rolling walker  -CT      Gait, Distance (Feet) 100   100 ft in AM and 100 ft in PM  -CT      Gait, Gait Pattern Analysis swing-to gait  -CT      Gait, Gait Deviations antalgic  -CT      Gait, Impairments strength decreased;impaired balance  -CT      Recorded by [CT] Melani Estrada, PT      Therapy Exercises    Bilateral Lower Extremities AROM:;10 reps;sitting  -CT      Recorded by [CT] Melani Estrada, PT      Positioning and Restraints    In Chair sitting;call light within reach;encouraged to call for assist   in AM and PM  -CT      Recorded by [CT] Melani Estrada, PT        User Key  (r) = Recorded By, (t) = Taken By, (c) = Cosigned By    Initials Name Effective Dates    CT Melani Estrada, PT 03/14/16 -                 IP PT Goals       11/20/17 1626          Transfer Training PT LTG    Transfer Training PT LTG, Date Established 11/20/17  -CT      Transfer Training PT LTG, Time to Achieve by discharge  -CT      Transfer Training PT LTG, Activity Type bed to chair /chair to bed;sit to stand/stand to sit  -CT      Transfer Training PT LTG, [place] Level conditional independence  -CT      Transfer Training PT LTG, Assist Device other (see comments)   with appropriate  AD  -CT      Gait Training PT LTG    Gait Training Goal PT LTG, Date Established 11/20/17  -CT      Gait Training Goal PT LTG, Time to Achieve by discharge  -CT      Gait Training Goal PT LTG, [place] Level conditional independence  -CT      Gait Training Goal PT LTG, Assist Device other (see comments)   with appropriate AD  -CT      Gait Training Goal PT LTG, Distance to Achieve 120  -CT        User Key  (r) = Recorded By, (t) = Taken By, (c) = Cosigned By    Initials Name Provider Type    CT Melani Estrada PT Physical Therapist          Physical Therapy Education     Title: PT OT SLP Therapies (Done)     Topic: Physical Therapy (Done)     Point: Mobility training (Done)    Learning Progress Summary    Learner Readiness Method Response Comment Documented by Status   Patient Acceptance E VU  CT 11/21/17 1705 Done    Acceptance E VU  CT 11/20/17 1629 Done               Point: Home exercise program (Done)    Learning Progress Summary    Learner Readiness Method Response Comment Documented by Status   Patient Acceptance E VU  CT 11/21/17 1705 Done    Acceptance E VU  CT 11/20/17 1629 Done               Point: Body mechanics (Done)    Learning Progress Summary    Learner Readiness Method Response Comment Documented by Status   Patient Acceptance E VU  CT 11/21/17 1705 Done    Acceptance E VU  CT 11/20/17 1629 Done               Point: Precautions (Done)    Learning Progress Summary    Learner Readiness Method Response Comment Documented by Status   Patient Acceptance E VU  CT 11/21/17 1705 Done    Acceptance E VU  CT 11/20/17 1629 Done                      User Key     Initials Effective Dates Name Provider Type Discipline    CT 03/14/16 -  Melani Estrada PT Physical Therapist PT                    PT Recommendation and Plan  Anticipated Equipment Needs At Discharge: front wheeled walker, bedside commode  Anticipated Discharge Disposition:  (to be determined based on progress)  Planned Therapy Interventions:  balance training, bed mobility training, gait training, home exercise program, neuromuscular re-education, joint mobilization, patient/family education, ROM (Range of Motion), stair training, postural re-education, strengthening, transfer training  PT Frequency: 2 times/day, 5 times/wk, per priority policy             Outcome Measures       11/21/17 1700 11/20/17 1600       How much help from another person do you currently need...    Turning from your back to your side while in flat bed without using bedrails? 4  -CT 4  -CT     Moving from lying on back to sitting on the side of a flat bed without bedrails? 4  -CT 4  -CT     Moving to and from a bed to a chair (including a wheelchair)? 3  -CT 3  -CT     Standing up from a chair using your arms (e.g., wheelchair, bedside chair)? 3  -CT 3  -CT     Climbing 3-5 steps with a railing? 2  -CT 2  -CT     To walk in hospital room? 3  -CT 3  -CT     AM-PAC 6 Clicks Score 19  -CT 19  -CT     Functional Assessment    Outcome Measure Options AM-PAC 6 Clicks Basic Mobility (PT)  -CT AM-PAC 6 Clicks Basic Mobility (PT)  -CT       User Key  (r) = Recorded By, (t) = Taken By, (c) = Cosigned By    Initials Name Provider Type    CT Melani Estrada PT Physical Therapist           Time Calculation:         PT Charges       11/21/17 1706 11/21/17 1705       Time Calculation    PT Received On 11/21/17  -CT 11/21/17  -CT     PT - Next Appointment  11/22/17  -CT     PT Goal Re-Cert Due Date  12/04/17  -CT     Time Calculation- PT    Total Timed Code Minutes- PT 29 minute(s)   PM  -CT 32 minute(s)   AM  -CT       User Key  (r) = Recorded By, (t) = Taken By, (c) = Cosigned By    Initials Name Provider Type    CT Melani Estrada PT Physical Therapist          Therapy Charges for Today     Code Description Service Date Service Provider Modifiers Qty    53896788798 HC PT MOBILITY CURRENT 11/20/2017 Melani Estrada PT GP, CJ 1    15014168560 HC PT MOBILITY PROJECTED 11/20/2017 Melani Estrada  PT GP, CI 1    05789294986 HC PT EVAL MOD COMPLEXITY 2 11/20/2017 Melani Estrada, PT GP 1    98376181374 HC GAIT TRAINING EA 15 MIN 11/20/2017 Melani Estrada, PT GP 1    15556152036 HC PT THER SUPP EA 15 MIN 11/20/2017 Melani Estrada, PT GP 3    98752183306 HC GAIT TRAINING EA 15 MIN 11/20/2017 Melani Shannonell, PT GP 1    15727288435 HC PT THERAPEUTIC ACT EA 15 MIN 11/20/2017 Melani Shannonell, PT GP 1    66286924708 HC PT THER SUPP EA 15 MIN 11/20/2017 Melani Estrada, PT GP 2    03919309171 HC GAIT TRAINING EA 15 MIN 11/21/2017 Melani Estrada, PT GP 1    72246610640 HC PT THER PROC EA 15 MIN 11/21/2017 Melani Estrada, PT GP 1    67630092911 HC PT THER SUPP EA 15 MIN 11/21/2017 Melani Estrada, PT GP 2    93855092026 HC GAIT TRAINING EA 15 MIN 11/21/2017 Melani Estrada, PT GP 1    38177734110 HC PT THERAPEUTIC ACT EA 15 MIN 11/21/2017 Melani Estrada, PT GP 1    41057446090 HC PT THER SUPP EA 15 MIN 11/21/2017 Melani Estrada, PT GP 2          PT G-Codes  Outcome Measure Options: AM-PAC 6 Clicks Basic Mobility (PT)  Score: 19  Functional Limitation: Mobility: Walking and moving around  Mobility: Walking and Moving Around Current Status (): At least 20 percent but less than 40 percent impaired, limited or restricted  Mobility: Walking and Moving Around Goal Status (): At least 1 percent but less than 20 percent impaired, limited or restricted    Melani Sean, PT  11/21/2017

## 2017-11-22 PROCEDURE — 94799 UNLISTED PULMONARY SVC/PX: CPT

## 2017-11-22 PROCEDURE — 97116 GAIT TRAINING THERAPY: CPT

## 2017-11-22 PROCEDURE — 97110 THERAPEUTIC EXERCISES: CPT

## 2017-11-22 PROCEDURE — 25010000002 ENOXAPARIN PER 10 MG: Performed by: ORTHOPAEDIC SURGERY

## 2017-11-22 PROCEDURE — 97530 THERAPEUTIC ACTIVITIES: CPT

## 2017-11-22 RX ADMIN — ENOXAPARIN SODIUM 40 MG: 40 INJECTION SUBCUTANEOUS at 09:18

## 2017-11-22 RX ADMIN — LEVOTHYROXINE SODIUM 25 MCG: 75 TABLET ORAL at 09:18

## 2017-11-22 RX ADMIN — NAPROXEN 250 MG: 250 TABLET ORAL at 09:18

## 2017-11-22 RX ADMIN — SODIUM CHLORIDE 50 ML/HR: 9 INJECTION, SOLUTION INTRAVENOUS at 10:33

## 2017-11-22 RX ADMIN — OXYCODONE HYDROCHLORIDE AND ACETAMINOPHEN 1 TABLET: 5; 325 TABLET ORAL at 02:35

## 2017-11-22 RX ADMIN — OXYCODONE HYDROCHLORIDE AND ACETAMINOPHEN 1 TABLET: 5; 325 TABLET ORAL at 15:39

## 2017-11-22 RX ADMIN — OXYCODONE HYDROCHLORIDE AND ACETAMINOPHEN 1 TABLET: 5; 325 TABLET ORAL at 21:14

## 2017-11-22 RX ADMIN — LISINOPRIL 20 MG: 10 TABLET ORAL at 09:18

## 2017-11-22 NOTE — PROGRESS NOTES
Progress Note   11/22/17      Subjective     Interval History:     Complaints: Doing well, working with physical therapy.  Pain controlled still awaiting inpt rehab eval.        Objective     Intake & Output (last day)       11/21 0701 - 11/22 0700    P.O. 1200    Total Intake(mL/kg) 1200 (16.1)    Net +1200         Unmeasured Urine Occurrence 5 x          Vital Signs  Temp:  [97.6 °F (36.4 °C)-98.2 °F (36.8 °C)] 97.6 °F (36.4 °C)  Heart Rate:  [70-71] 71  Resp:  [18-20] 20  BP: (148-178)/(46-50) 148/46    Physical Exam:   General Appearance alert, appears stated age and cooperative   Lungs clear to auscultation, respirations regular and respirations unlabored   Heart regular rhythm & normal rate, normal S1, S2 and no murmur, no noemi   Abdomen normal bowel sounds, no masses, no hepatomegaly, soft non-tender   Extremities moves extremities well, no edema, no cyanosis and no redness    Labs:  Lab Results (last 72 hours)     Procedure Component Value Units Date/Time    Blood Culture - Blood, [[id number]]  (Normal) Collected:  11/18/17 1910    Specimen:  Blood from Arm, Left Updated:  11/21/17 1946     Blood Culture No growth at 3 days          Xray:  Imaging Results (last 24 hours)     ** No results found for the last 24 hours. **             Results Review:     I reviewed the patient's new clinical results.    Medication Review:   Hospital Medications (active)       Dose Frequency Start End    acetaminophen (TYLENOL) tablet 325 mg 325 mg Every 4 Hours PRN 11/18/2017     Sig - Route: Take 1 tablet by mouth Every 4 (Four) Hours As Needed for Fever (>101). - Oral    acetaminophen (TYLENOL) tablet 650 mg 650 mg Every 4 Hours PRN 11/18/2017     Sig - Route: Take 2 tablets by mouth Every 4 (Four) Hours As Needed for Mild Pain . - Oral    enoxaparin (LOVENOX) syringe 40 mg 40 mg Daily 11/19/2017     Sig - Route: Inject 0.4 mL under the skin Daily. - Subcutaneous    HYDROmorphone (DILAUDID) injection 0.5 mg 0.5 mg Every 3  Hours PRN 11/18/2017 11/28/2017    Sig - Route: Infuse 0.5 mL into a venous catheter Every 3 (Three) Hours As Needed for Moderate Pain  or Severe Pain . - Intravenous    lactated ringers infusion 125 mL/hr Continuous 11/18/2017     Sig - Route: Infuse 125 mL/hr into a venous catheter Continuous. - Intravenous    levothyroxine (SYNTHROID, LEVOTHROID) tablet 25 mcg 25 mcg Daily 11/19/2017     Sig - Route: Take 1 tablet by mouth Daily. - Oral    lisinopril (PRINIVIL,ZESTRIL) tablet 20 mg 20 mg Daily 11/19/2017     Sig - Route: Take 2 tablets by mouth Daily. - Oral    naproxen (NAPROSYN) tablet 250 mg 250 mg Daily 11/19/2017     Sig - Route: Take 1 tablet by mouth Daily. - Oral    ondansetron (ZOFRAN) injection 4 mg 4 mg Every 8 Hours PRN 11/21/2017     Sig - Route: Infuse 2 mL into a venous catheter Every 8 (Eight) Hours As Needed for Nausea or Vomiting. - Intravenous    oxyCODONE-acetaminophen (PERCOCET) 5-325 MG per tablet 1 tablet 1 tablet Every 6 Hours PRN 11/19/2017     Sig - Route: Take 1 tablet by mouth Every 6 (Six) Hours As Needed (pain). - Oral    sodium chloride 0.9 % infusion 125 mL/hr Continuous 11/18/2017     Sig - Route: Infuse 125 mL/hr into a venous catheter Continuous. - Intravenous    Cosign for Ordering: Accepted by Ramana Bass MD on 11/18/2017 11:32 AM    sodium chloride 0.9 % infusion 50 mL/hr Continuous 11/18/2017     Sig - Route: Infuse 50 mL/hr into a venous catheter Continuous. - Intravenous          Assessment/Plan    1.Right Femur Fracture: POD #5 ORIF doing well.  ?inpt rehab to determine today?.  On lovenox5  2.HTN: better controlled this am      Sukhdev Ramirez MD  11/22/17  5:45 AM

## 2017-11-22 NOTE — SIGNIFICANT NOTE
11/22/17 1318   Case Management/Social Work Plan   Plan Called and left a message for Kindra at [facility]'s Comp ([phone]) to obtain authorization for Inpatient Rehab admission.

## 2017-11-22 NOTE — THERAPY EVALUATION
Acute Care - Occupational Therapy Initial Evaluation   [place]     Patient Name: Gilda Galdamez  : 1955  MRN: [id number]  Today's Date: 2017  Onset of Illness/Injury or Date of Surgery Date: 17 (admit date)     Referring Physician: Vane    Admit Date: 2017       ICD-10-CM ICD-9-CM   1. Closed displaced transverse fracture of shaft of right femur, initial encounter S72.321A 821.01     Patient Active Problem List   Diagnosis   • Femur fracture, right   • Closed displaced transverse fracture of shaft of right femur     Past Medical History:   Diagnosis Date   • Disease of thyroid gland    • Hypertension      Past Surgical History:   Procedure Laterality Date   • FEMUR OPEN REDUCTION INTERNAL FIXATION Right 2017    Procedure: FEMUR OPEN REDUCTION INTERNAL FIXATION;  Surgeon: Ian Cifuentes MD;  Location: [facility];  Service:    • TONSILLECTOMY            OT ASSESSMENT FLOWSHEET (last 72 hours)      OT Evaluation       17 2029 17 1702 17 1108 17 0812 17    Rehab Evaluation    Document Type  therapy note (daily note)  -CT evaluation  -KR      Subjective Information  agree to therapy  -CT agree to therapy  -KR      Patient Effort, Rehab Treatment  good  -CT good  -KR      General Information    Precautions/Limitations  fall precautions   WBAT RLE  -CT       Living Environment    Lives With   spouse  -KR      Clinical Impression    Criteria for Skilled Therapeutic Interventions Met   yes  -KR      Rehab Potential   good, to achieve stated therapy goals  -KR      Therapy Frequency   3-5 times/wk  -KR      Pain Assessment    Pain Assessment  0-10  -CT       Pain Score  2  -CT       Pain Location  Leg  -CT       Pain Orientation  Right  -CT       Cognitive Assessment/Intervention    Current Cognitive/Communication Assessment  functional  -CT functional  -KR      Orientation Status  oriented x 4  -CT oriented x 4  -KR      Follows Commands/Answers Questions    100% of the time  -KR      Personal Safety Interventions  fall prevention program maintained;gait belt;muscle strengthening facilitated;nonskid shoes/slippers when out of bed  -CT       ROM (Range of Motion)    General ROM Detail   BUE WFL  -KR      MMT (Manual Muscle Testing)    General MMT Assessment Detail   BUE 3/5  -KR      Muscle Tone Assessment    Muscle Tone Assessment RLE  -CH   RLE  -BW RLE  -EM    RLE Muscle Tone Assessment mildly decreased tone  -CH   mildly decreased tone   R femur fracture  -BW mildly decreased tone   R femur fracture  -EM    Mobility Assessment/Training    Right Lower Extremity Weight-Bearing  weight-bearing as tolerated   per MD orders  -CT       Bed Mobility, Assessment/Treatment    Bed Mobility, Assistive Device  bed rails  -CT       Bed Mobility, Roll Left, [place]  --  -CT       Bed Mobility, Roll Right, [place]  --  -CT       Bed Mobility, Scoot/Bridge, [place]  contact guard assist  -CT       Bed Mob, Supine to Sit, [place]  contact guard assist  -CT       Bed Mob, Sit to Supine, [place]  --  -CT       Bed Mobility, Impairments  strength decreased;impaired balance  -CT       Transfer Assessment/Treatment    Transfers, Bed-Chair-Bed, Assist Device  --  -CT       Transfers, Sit-Stand [place]  contact guard assist  -CT       Transfers, Stand-Sit [place]  contact guard assist  -CT       Transfers, Sit-Stand-Sit, Assist Device  rolling walker  -CT       Upper Body Bathing Assessment/Training    UB Bathing Assess/Train, [place] Level   minimum assist (75% patient effort)  -KR      Lower Body Bathing Assessment/Training    LB Bathing Assess/Train, [place] Level   maximum assist (25% patient effort)  -KR      Upper Body Dressing Assessment/Training    UB Dressing Assess/Train, [place]   minimum assist (75% patient effort)  -KR      Lower Body Dressing Assessment/Training    LB Dressing Assess/Train, [place]   maximum assist (25%  patient effort)  -KR      Toileting Assessment/Training    Toileting Assess/Train, Indepen Level   maximum assist (25% patient effort)  -KR      Grooming Assessment/Training    Grooming Assess/Train, Indepen Level   minimum assist (75% patient effort)  -KR      Therapy Exercises    Bilateral Lower Extremities  AROM:;10 reps;sitting  -CT       General Therapy Interventions    Planned Therapy Interventions   adaptive equipment training;strengthening  -KR      Positioning and Restraints    In Chair  sitting;call light within reach;encouraged to call for assist   in AM and PM  -CT         11/20/17 1556 11/20/17 1416 11/20/17 0830 11/19/17 1930       Rehab Evaluation    Document Type evaluation;therapy note (daily note)  -CT        Subjective Information agree to therapy;complains of;pain  -CT        Patient Effort, Rehab Treatment excellent  -CT        Symptoms Noted During/After Treatment fatigue;increased pain  -CT        Symptoms Noted Comment Pt tolerated evaluation and treatment session well with rest breaks provided as needed.   -CT        General Information    Patient Profile Review yes  -CT        Onset of Illness/Injury or Date of Surgery Date 11/18/17   admit date  -CT        Referring Physician Page  -CT        Precautions/Limitations fall precautions   WBAT RLE   -CT        Prior Level of Function independent:;all household mobility;community mobility  -CT        Equipment Currently Used at Home none  -CT        Plans/Goals Discussed With patient;agreed upon  -CT        Risks Reviewed patient:;LOB;nausea/vomiting;dizziness;increased discomfort;change in vital signs;increased drainage;lines disloged  -CT        Benefits Reviewed patient:;improve function;increase independence;increase strength;increase balance;decrease pain;decrease risk of DVT;increase knowledge;improve skin integrity  -CT        Barriers to Rehab physical barrier  -CT        Living Environment    Lives With spouse  -CT spouse  -        Living Arrangements house  -CT        Home Accessibility stairs to enter home  -CT        Number of Stairs to Enter Home 3  -CT        Stair Railings at Home outside, present at both sides  -CT        Pain Assessment    Pain Assessment 0-10  -CT        Pain Score 4  -CT        Pain Type Acute pain  -CT        Pain Location Leg  -CT        Pain Orientation Right  -CT        Pain Intervention(s) Repositioned;Rest  -CT        Cognitive Assessment/Intervention    Current Cognitive/Communication Assessment functional  -CT        Orientation Status oriented x 4  -CT        Follows Commands/Answers Questions able to follow multi-step instructions;100% of the time  -CT        Personal Safety WNL/WFL  -CT        Personal Safety Interventions fall prevention program maintained;gait belt;muscle strengthening facilitated;nonskid shoes/slippers when out of bed  -CT        ROM (Range of Motion)    General ROM Detail LLE grossly WNL; RLE not tested  -CT        MMT (Manual Muscle Testing)    General MMT Assessment Detail LLE grossly 4/5; RLE not tested  -CT        Muscle Tone Assessment    Muscle Tone Assessment   RLE  -SD RLE  -EM     RLE Muscle Tone Assessment   mildly decreased tone   right femur fx  -SD mildly decreased tone  -EM     Mobility Assessment/Training    Extremity Weight-Bearing Status right lower extremity  -CT        Right Lower Extremity Weight-Bearing weight-bearing as tolerated   per MD orders  -CT        Bed Mobility, Assessment/Treatment    Bed Mobility, Assistive Device bed rails  -CT        Bed Mobility, Roll Left, [place] contact guard assist  -CT        Bed Mobility, Roll Right, [place] contact guard assist  -CT        Bed Mobility, Scoot/Bridge, [place] contact guard assist  -CT        Bed Mob, Supine to Sit, [place] contact guard assist  -CT        Bed Mob, Sit to Supine, [place] contact guard assist  -CT        Bed Mobility, Impairments strength decreased;impaired balance  -CT         Transfer Assessment/Treatment    Transfers, Bed-Chair-Bed, Assist Device rolling walker  -CT        Transfers, Sit-Stand [place] contact guard assist  -CT        Transfers, Stand-Sit [place] contact guard assist  -CT        Transfers, Sit-Stand-Sit, Assist Device rolling walker  -CT        Therapy Exercises    Bilateral Lower Extremities AROM:;10 reps;sitting  -CT        Positioning and Restraints    Pre-Treatment Position in bed  -CT        Post Treatment Position --  -CT        In Bed supine;call light within reach;encouraged to call for assist   in PM  -CT        In Chair sitting;call light within reach;encouraged to call for assist;notified nsg   in AM  -CT          User Key  (r) = Recorded By, (t) = Taken By, (c) = Cosigned By    Initials Name Effective Dates    SD Octavia Spears, RN 06/16/16 -     BW Rosa Butts, JACOB 06/16/16 -     KR Nasir Christie, OT 03/14/16 -     CT Melani Sean, PT 03/14/16 -     SAWYER Barney 03/14/16 -      Heidy Curiel RN 01/03/17 -     EM Lani Hi RN 05/11/17 -                  OT Recommendation and Plan  Planned Therapy Interventions: adaptive equipment training, strengthening  Therapy Frequency: 3-5 times/wk                 Outcome Measures       11/22/17 1100 11/21/17 1700 11/21/17 1113    How much help from another person do you currently need...    Turning from your back to your side while in flat bed without using bedrails?  4  -CT     Moving from lying on back to sitting on the side of a flat bed without bedrails?  4  -CT     Moving to and from a bed to a chair (including a wheelchair)?  3  -CT     Standing up from a chair using your arms (e.g., wheelchair, bedside chair)?  3  -CT     Climbing 3-5 steps with a railing?  2  -CT     To walk in hospital room?  3  -CT     AM-PAC 6 Clicks Score  19  -CT     How much help from another is currently needed...    Putting on and taking off regular lower body clothing?   2  -KR     Bathing (including washing, rinsing, and drying)   3  -KR    Toileting (which includes using toilet bed pan or urinal)   3  -KR    Putting on and taking off regular upper body clothing   3  -KR    Taking care of personal grooming (such as brushing teeth)   3  -KR    Eating meals   4  -KR    Score   18  -KR    Functional Assessment    Outcome Measure Options AM-PAC 6 Clicks Daily Activity (OT)  -KR AM-PAC 6 Clicks Basic Mobility (PT)  -CT       11/20/17 1600          How much help from another person do you currently need...    Turning from your back to your side while in flat bed without using bedrails? 4  -CT      Moving from lying on back to sitting on the side of a flat bed without bedrails? 4  -CT      Moving to and from a bed to a chair (including a wheelchair)? 3  -CT      Standing up from a chair using your arms (e.g., wheelchair, bedside chair)? 3  -CT      Climbing 3-5 steps with a railing? 2  -CT      To walk in hospital room? 3  -CT      AM-PAC 6 Clicks Score 19  -CT      Functional Assessment    Outcome Measure Options AM-PAC 6 Clicks Basic Mobility (PT)  -CT        User Key  (r) = Recorded By, (t) = Taken By, (c) = Cosigned By    Initials Name Provider Type    KR Nasir Christie OT Occupational Therapist    CT Melani Estrada PT Physical Therapist          Time Calculation:        Therapy Charges for Today     Code Description Service Date Service Provider Modifiers Qty    26589981960  OT SELFCARE CURRENT 11/21/2017 BEKAH Nuñez, CK 1    [id number]  OT SELFCARE PROJECTED 11/21/2017 BEKAH Nuñez, CI 1    [id number]  OT EVAL HIGH COMPLEXITY 2 11/21/2017 Nasir Christie OT GO 1          OT G-codes  Functional Limitation: Self care  Self Care Current Status (): At least 40 percent but less than 60 percent impaired, limited or restricted  Self Care Goal Status (): At least 1 percent but less than 20 percent impaired, limited or restricted    Nasir Christie OT  11/22/2017

## 2017-11-22 NOTE — PROGRESS NOTES
Discharge Planning Assessment   [place]     Patient Name: Gilda Galdamez  MRN: [id number]  Today's Date: 11/22/2017    Admit Date: 11/18/2017       Discharge Plan       11/22/17 1349    Case Management/Social Work Plan    Additional Comments SS spoke with pt on this date. Pt states she is agreeable to [facility] Rehab transfer on Friday. SS will follow and assist as needed.       11/22/17 7676     Cristina Barney

## 2017-11-22 NOTE — SIGNIFICANT NOTE
11/22/17 1337   Case Management/Social Work Plan   Plan Spoke to Kindra buchanan [facility] ([phone] ext [phone]) who approved Inpatient Rehab admission on 11/24/17 for 7 days.  Spoke to [address] [place] [address]  Cristina Marie about Rehab admission approval for 11/24/17.

## 2017-11-22 NOTE — THERAPY TREATMENT NOTE
Acute Care - Physical Therapy Treatment Note   [place]     Patient Name: Gilda Galdamez  : 1955  MRN: [id number]  Today's Date: 2017  Onset of Illness/Injury or Date of Surgery Date: 17 (admit date)  Date of Referral to PT: 17  Referring Physician: Vane    Admit Date: 2017    Visit Dx:    ICD-10-CM ICD-9-CM   1. Closed displaced transverse fracture of shaft of right femur, initial encounter S72.321A 821.01     Patient Active Problem List   Diagnosis   • Femur fracture, right   • Closed displaced transverse fracture of shaft of right femur               Adult Rehabilitation Note       17 1634 17 1702       Rehab Assessment/Intervention    Discipline physical therapist  -CT physical therapist  -CT     Document Type therapy note (daily note)  -CT therapy note (daily note)  -CT     Subjective Information agree to therapy  -CT agree to therapy  -CT     Patient Effort, Rehab Treatment good  -CT good  -CT     Precautions/Limitations fall precautions   WBAT RLE  -CT fall precautions   WBAT RLE  -CT     Patient Response to Treatment Pt tolerated both AM and PM treatment sessions well with rest breaks provided as needed.   -CT Pt tolerated both AM and PM treatment sessions well with rest breaks provided as needed.   -CT     Recorded by [CT] Melani Estrada PT [CT] Melani Estrada PT     Pain Assessment    Pain Assessment  0-10  -CT     Pain Score  2  -CT     Pain Location  Leg  -CT     Pain Orientation  Right  -CT     Recorded by  [CT] Melani Estrada PT     Cognitive Assessment/Intervention    Current Cognitive/Communication Assessment functional  -CT functional  -CT     Orientation Status oriented x 4  -CT oriented x 4  -CT     Personal Safety Interventions fall prevention program maintained;gait belt;muscle strengthening facilitated;nonskid shoes/slippers when out of bed  -CT fall prevention program maintained;gait belt;muscle strengthening facilitated;nonskid shoes/slippers when  out of bed  -CT     Recorded by [CT] Melani Estrada, PT [CT] Melani Estrada, PT     Mobility Assessment/Training    Right Lower Extremity Weight-Bearing weight-bearing as tolerated   per MD orders  -CT weight-bearing as tolerated   per MD orders  -CT     Recorded by [CT] Melani Estrada PT [CT] Melani Estrada, PT     Bed Mobility, Assessment/Treatment    Bed Mobility, Assistive Device bed rails  -CT bed rails  -CT     Bed Mobility, Roll Left, [place]  --  -CT     Bed Mobility, Roll Right, [place]  --  -CT     Bed Mobility, Scoot/Bridge, [place] contact guard assist  -CT contact guard assist  -CT     Bed Mob, Supine to Sit, [place] contact guard assist  -CT contact guard assist  -CT     Bed Mob, Sit to Supine, [place]  --  -CT     Bed Mobility, Impairments strength decreased;impaired balance  -CT strength decreased;impaired balance  -CT     Recorded by [CT] Melani Estrada, PT [CT] Melani Estrada PT     Transfer Assessment/Treatment    Transfers, Bed-Chair-Bed, Assist Device  --  -CT     Transfers, Sit-Stand [place] contact guard assist  -CT contact guard assist  -CT     Transfers, Stand-Sit [place] contact guard assist  -CT contact guard assist  -CT     Transfers, Sit-Stand-Sit, Assist Device rolling walker  -CT rolling walker  -CT     Transfer, Comment pt had LOB episode during stand pivot transfer to [facility] today.   -CT      Recorded by [CT] Melani Estrada, PT [CT] Melani Estrada, PT     Gait Assessment/Treatment    Gait, [place] Level contact guard assist  -CT contact guard assist  -CT     Gait, Assistive Device rolling walker  -CT rolling walker  -CT     Gait, Distance (Feet) 120   120 ft in AM; 120 ft in PM  -   100 ft in AM and 100 ft in PM  -CT     Gait, Gait Pattern Analysis swing-to gait  -CT swing-to gait  -CT     Gait, Gait Deviations antalgic  -CT antalgic  -CT     Gait, Impairments strength decreased;impaired balance  -CT strength decreased;impaired balance   -CT     Recorded by [CT] Melani Estrada, PT [CT] Melani Estrada PT     Therapy Exercises    Bilateral Lower Extremities AROM:;10 reps;sitting  -CT AROM:;10 reps;sitting  -CT     Recorded by [CT] Melani Estrada, PT [CT] Melani Estrada PT     Positioning and Restraints    In Bed supine;call light within reach;encouraged to call for assist;side rails up x3   in PM  -CT      In Chair sitting;call light within reach;encouraged to call for assist   in AM  -CT sitting;call light within reach;encouraged to call for assist   in AM and PM  -CT     Recorded by [CT] Melani Estrada, PT [CT] Melani Estrada, PT       User Key  (r) = Recorded By, (t) = Taken By, (c) = Cosigned By    Initials Name Effective Dates    CT Melani Estrada PT 03/14/16 -                 IP PT Goals       11/20/17 1626          Transfer Training PT LTG    Transfer Training PT LTG, Date Established 11/20/17  -CT      Transfer Training PT LTG, Time to Achieve by discharge  -CT      Transfer Training PT LTG, Activity Type bed to chair /chair to bed;sit to stand/stand to sit  -CT      Transfer Training PT LTG, [place] Level conditional independence  -CT      Transfer Training PT LTG, Assist Device other (see comments)   with appropriate AD  -CT      Gait Training PT LTG    Gait Training Goal PT LTG, Date Established 11/20/17  -CT      Gait Training Goal PT LTG, Time to Achieve by discharge  -CT      Gait Training Goal PT LTG, [place] Level conditional independence  -CT      Gait Training Goal PT LTG, Assist Device other (see comments)   with appropriate AD  -CT      Gait Training Goal PT LTG, Distance to Achieve 120  -CT        User Key  (r) = Recorded By, (t) = Taken By, (c) = Cosigned By    Initials Name Provider Type    CT Melani Estrada PT Physical Therapist          Physical Therapy Education     Title: PT OT SLP Therapies (Done)     Topic: Physical Therapy (Done)     Point: Mobility training (Done)    Learning Progress Summary    Learner  Readiness Method Response Comment Documented by Status   Patient Acceptance E VU  CT 11/22/17 1637 Done    Acceptance E VU  CT 11/21/17 1705 Done    Acceptance E VU  CT 11/20/17 1629 Done               Point: Home exercise program (Done)    Learning Progress Summary    Learner Readiness Method Response Comment Documented by Status   Patient Acceptance E VU  CT 11/22/17 1637 Done    Acceptance E VU  CT 11/21/17 1705 Done    Acceptance E VU  CT 11/20/17 1629 Done               Point: Body mechanics (Done)    Learning Progress Summary    Learner Readiness Method Response Comment Documented by Status   Patient Acceptance E VU  CT 11/22/17 1637 Done    Acceptance E VU  CT 11/21/17 1705 Done    Acceptance E VU  CT 11/20/17 1629 Done               Point: Precautions (Done)    Learning Progress Summary    Learner Readiness Method Response Comment Documented by Status   Patient Acceptance E VU  CT 11/22/17 1637 Done    Acceptance E VU  CT 11/21/17 1705 Done    Acceptance E VU  CT 11/20/17 1629 Done                      User Key     Initials Effective Dates Name Provider Type Discipline    CT 03/14/16 -  Melani Estrada, PT Physical Therapist PT                    PT Recommendation and Plan  Anticipated Equipment Needs At Discharge: front wheeled walker, bedside commode  Anticipated Discharge Disposition:  (to be determined based on progress)  Planned Therapy Interventions: balance training, bed mobility training, gait training, home exercise program, neuromuscular re-education, joint mobilization, patient/family education, ROM (Range of Motion), stair training, postural re-education, strengthening, transfer training  PT Frequency: 2 times/day, 5 times/wk, per priority policy             Outcome Measures       11/22/17 1600 11/22/17 1100 11/21/17 1700    How much help from another person do you currently need...    Turning from your back to your side while in flat bed without using bedrails? 4  -CT  4  -CT    Moving from lying  on back to sitting on the side of a flat bed without bedrails? 4  -CT  4  -CT    Moving to and from a bed to a chair (including a wheelchair)? 3  -CT  3  -CT    Standing up from a chair using your arms (e.g., wheelchair, bedside chair)? 3  -CT  3  -CT    Climbing 3-5 steps with a railing? 2  -CT  2  -CT    To walk in hospital room? 3  -CT  3  -CT    AM-PAC 6 Clicks Score 19  -CT  19  -CT    Functional Assessment    Outcome Measure Options AM-PAC 6 Clicks Basic Mobility (PT)  -CT AM-PAC 6 Clicks Daily Activity (OT)  -KR AM-PAC 6 Clicks Basic Mobility (PT)  -CT      11/21/17 1113 11/20/17 1600       How much help from another person do you currently need...    Turning from your back to your side while in flat bed without using bedrails?  4  -CT     Moving from lying on back to sitting on the side of a flat bed without bedrails?  4  -CT     Moving to and from a bed to a chair (including a wheelchair)?  3  -CT     Standing up from a chair using your arms (e.g., wheelchair, bedside chair)?  3  -CT     Climbing 3-5 steps with a railing?  2  -CT     To walk in hospital room?  3  -CT     AM-PAC 6 Clicks Score  19  -CT     How much help from another is currently needed...    Putting on and taking off regular lower body clothing? 2  -KR      Bathing (including washing, rinsing, and drying) 3  -KR      Toileting (which includes using toilet bed pan or urinal) 3  -KR      Putting on and taking off regular upper body clothing 3  -KR      Taking care of personal grooming (such as brushing teeth) 3  -KR      Eating meals 4  -KR      Score 18  -KR      Functional Assessment    Outcome Measure Options  AM-PAC 6 Clicks Basic Mobility (PT)  -CT       User Key  (r) = Recorded By, (t) = Taken By, (c) = Cosigned By    Initials Name Provider Type    KR Nasir Christie, OT Occupational Therapist    CT Melani Estrada, DREW Physical Therapist           Time Calculation:         PT Charges       11/22/17 1645 11/22/17 1638       Time Calculation     PT Received On 11/22/17  -CT 11/22/17  -CT     PT - Next Appointment --  -CT --  -CT     PT Goal Re-Cert Due Date  12/04/17  -CT     Time Calculation- PT    Total Timed Code Minutes- PT 31 minute(s)   PM  -CT 28 minute(s)   AM  -CT       User Key  (r) = Recorded By, (t) = Taken By, (c) = Cosigned By    Initials Name Provider Type    CT Melani Estrada, PT Physical Therapist          Therapy Charges for Today     Code Description Service Date Service Provider Modifiers Qty    72648900840 HC GAIT TRAINING EA 15 MIN 11/21/2017 Melani Sean, PT GP 1    08202333411 HC PT THER PROC EA 15 MIN 11/21/2017 Melani Sean, PT GP 1    28584683944 HC PT THER SUPP EA 15 MIN 11/21/2017 Melani Sean, PT GP 2    19296351775 HC GAIT TRAINING EA 15 MIN 11/21/2017 Melani Sean, PT GP 1    23104251547 HC PT THERAPEUTIC ACT EA 15 MIN 11/21/2017 Melani Estrada, PT GP 1    69969526357 HC PT THER SUPP EA 15 MIN 11/21/2017 Melani Sean, PT GP 2    74207683505 HC GAIT TRAINING EA 15 MIN 11/22/2017 Melani Sean, PT GP 1    07363945608 HC PT THERAPEUTIC ACT EA 15 MIN 11/22/2017 Melani Sean, PT GP 1    04487332847 HC PT THER SUPP EA 15 MIN 11/22/2017 Melani Sean, PT GP 2    53319350327 HC GAIT TRAINING EA 15 MIN 11/22/2017 Melani Sean, PT GP 1    78775917975 HC PT THER PROC EA 15 MIN 11/22/2017 Melani Sean, PT GP 1    29276182099 HC PT THER SUPP EA 15 MIN 11/22/2017 Melani Estrada, PT GP 2          PT G-Codes  Outcome Measure Options: AM-PAC 6 Clicks Basic Mobility (PT)  Score: 19  Functional Limitation: Mobility: Walking and moving around  Mobility: Walking and Moving Around Current Status (): At least 20 percent but less than 40 percent impaired, limited or restricted  Mobility: Walking and Moving Around Goal Status (): At least 1 percent but less than 20 percent impaired, limited or restricted    Melani Estrada, PT  11/22/2017

## 2017-11-22 NOTE — SIGNIFICANT NOTE
11/22/17 1337   Case Management/Social Work Plan   Plan Spoke to Kindra buchanan [employer] [facility] ([phone] ext [phone]) who approved Inpatient Rehab admission on 11/24/17 for 7 days.  Spoke to [address] [place] [address]  Cristina Marie about Rehab admission approval for 11/24/17.  Authorization # for inpatient Rehab will be the claim #[id number].

## 2017-11-22 NOTE — PLAN OF CARE
Problem: Orthopaedic Fracture (Adult)  Goal: Signs and Symptoms of Listed Potential Problems Will be Absent or Manageable (Orthopaedic Fracture)  Outcome: Ongoing (interventions implemented as appropriate)    11/20/17 1025   Orthopaedic Fracture   Problems Assessed (Orthopaedic Fracture) all   Problems Present (Orthopaedic Fracture) none         Problem: Pain, Chronic (Adult)  Goal: Acceptable Pain Control/Comfort Level  Outcome: Ongoing (interventions implemented as appropriate)    11/21/17 2252   Pain, Chronic (Adult)   Acceptable Pain Control/Comfort Level making progress toward outcome         Problem: Activity Intolerance (Adult)  Goal: Activity Tolerance  Outcome: Ongoing (interventions implemented as appropriate)    11/21/17 2252   Activity Intolerance (Adult)   Activity Tolerance making progress toward outcome         Problem: Fall Risk (Adult)  Goal: Absence of Falls  Outcome: Ongoing (interventions implemented as appropriate)    11/21/17 2252   Fall Risk (Adult)   Absence of Falls making progress toward outcome         Problem: Skin Integrity Impairment, Risk/Actual (Adult)  Goal: Identify Related Risk Factors and Signs and Symptoms  Outcome: Ongoing (interventions implemented as appropriate)    11/20/17 1025   Skin Integrity Impairment, Risk/Actual   Skin Integrity Impairment, Risk/Actual: Related Risk Factors surgery/procedure       Goal: Skin Integrity/Wound Healing  Outcome: Ongoing (interventions implemented as appropriate)    11/21/17 2252   Skin Integrity Impairment, Risk/Actual (Adult)   Skin Integrity/Wound Healing making progress toward outcome

## 2017-11-22 NOTE — PAYOR COMM NOTE
"  Contact: Miracle Church RN @ [facility]  Phone: [phone]  Fax: [phone]    Clinical update        Chetna Galdamez (62 y.o. Female)     Date of Birth Social Security Number Address Home Phone MRN    1955  [address] [address] [phone] [id number]    Mandaeism Marital Status          Caodaism        Admission Date Admission Type Admitting Provider Attending Provider Department, Room/Bed    11/18/17 Emergency Sukhdev Ramirez MD Perry, Kelvin D, MD [address], 3330/1P    Discharge Date Discharge Disposition Discharge Destination                      Attending Provider: Sukhdev Ramirez MD     Allergies:  No Known Allergies    Isolation:  None   Infection:  None   Code Status:  FULL    Ht:  67\" (170.2 cm)   Wt:  164 lb 7 oz (74.6 kg)    Admission Cmt:  None   Principal Problem:  Closed displaced transverse fracture of shaft of right femur [S72.321A] More...                 Active Insurance as of 11/18/2017     Primary Coverage     Payor Plan Insurance Group Employer/Plan Group    WORKERS COMPENSATION MISC WORKERS COMPENSATION      Coverage Address Coverage Phone Number Effective From Effective To    [address] [address] [phone] 11/18/2017     [place] [address]       Subscriber Name Subscriber Birth Date Member ID       CHETNA GALDAMEZ [id number]                 Emergency Contacts      (Rel.) Home Phone Work Phone Mobile Phone    George Galdamez (Friend) [phone] -- --               Physician Progress Notes (last 24 hours) (Notes from 11/21/2017  8:37 AM through 11/22/2017  8:37 AM)      Sukhdev Ramirez MD at 11/22/2017  5:45 AM  Version 1 of 1           Progress Note   11/22/17      Subjective     Interval History:     Complaints: Doing well, working with physical therapy.  Pain controlled still awaiting inpt rehab eval.        Objective     Intake & Output (last day)       11/21 0701 - 11/22 0700    P.O. 1200    Total " Intake(mL/kg) 1200 (16.1)    Net +1200         Unmeasured Urine Occurrence 5 x          Vital Signs  Temp:  [97.6 °F (36.4 °C)-98.2 °F (36.8 °C)] 97.6 °F (36.4 °C)  Heart Rate:  [70-71] 71  Resp:  [18-20] 20  BP: (148-178)/(46-50) 148/46    Physical Exam:   General Appearance alert, appears stated age and cooperative   Lungs clear to auscultation, respirations regular and respirations unlabored   Heart regular rhythm & normal rate, normal S1, S2 and no murmur, no noemi   Abdomen normal bowel sounds, no masses, no hepatomegaly, soft non-tender   Extremities moves extremities well, no edema, no cyanosis and no redness    Labs:  Lab Results (last 72 hours)     Procedure Component Value Units Date/Time    Blood Culture - Blood, [[id number]]  (Normal) Collected:  11/18/17 1910    Specimen:  Blood from Arm, Left Updated:  11/21/17 1946     Blood Culture No growth at 3 days          Xray:  Imaging Results (last 24 hours)     ** No results found for the last 24 hours. **             Results Review:     I reviewed the patient's new clinical results.    Medication Review:   Hospital Medications (active)       Dose Frequency Start End    acetaminophen (TYLENOL) tablet 325 mg 325 mg Every 4 Hours PRN 11/18/2017     Sig - Route: Take 1 tablet by mouth Every 4 (Four) Hours As Needed for Fever (>101). - Oral    acetaminophen (TYLENOL) tablet 650 mg 650 mg Every 4 Hours PRN 11/18/2017     Sig - Route: Take 2 tablets by mouth Every 4 (Four) Hours As Needed for Mild Pain . - Oral    enoxaparin (LOVENOX) syringe 40 mg 40 mg Daily 11/19/2017     Sig - Route: Inject 0.4 mL under the skin Daily. - Subcutaneous    HYDROmorphone (DILAUDID) injection 0.5 mg 0.5 mg Every 3 Hours PRN 11/18/2017 11/28/2017    Sig - Route: Infuse 0.5 mL into a venous catheter Every 3 (Three) Hours As Needed for Moderate Pain  or Severe Pain . - Intravenous    lactated ringers infusion 125 mL/hr Continuous 11/18/2017     Sig - Route: Infuse 125 mL/hr into a  venous catheter Continuous. - Intravenous    levothyroxine (SYNTHROID, LEVOTHROID) tablet 25 mcg 25 mcg Daily 11/19/2017     Sig - Route: Take 1 tablet by mouth Daily. - Oral    lisinopril (PRINIVIL,ZESTRIL) tablet 20 mg 20 mg Daily 11/19/2017     Sig - Route: Take 2 tablets by mouth Daily. - Oral    naproxen (NAPROSYN) tablet 250 mg 250 mg Daily 11/19/2017     Sig - Route: Take 1 tablet by mouth Daily. - Oral    ondansetron (ZOFRAN) injection 4 mg 4 mg Every 8 Hours PRN 11/21/2017     Sig - Route: Infuse 2 mL into a venous catheter Every 8 (Eight) Hours As Needed for Nausea or Vomiting. - Intravenous    oxyCODONE-acetaminophen (PERCOCET) 5-325 MG per tablet 1 tablet 1 tablet Every 6 Hours PRN 11/19/2017     Sig - Route: Take 1 tablet by mouth Every 6 (Six) Hours As Needed (pain). - Oral    sodium chloride 0.9 % infusion 125 mL/hr Continuous 11/18/2017     Sig - Route: Infuse 125 mL/hr into a venous catheter Continuous. - Intravenous    Cosign for Ordering: Accepted by Ramana Bass MD on 11/18/2017 11:32 AM    sodium chloride 0.9 % infusion 50 mL/hr Continuous 11/18/2017     Sig - Route: Infuse 50 mL/hr into a venous catheter Continuous. - Intravenous          Assessment/Plan    1.Right Femur Fracture: POD #5 ORIF doing well.  ?inpt rehab to determine today?.  On lovenox5  2.HTN: better controlled this am      Sukhdev Ramirez MD  11/22/17  5:45 AM         Electronically signed by Sukhdev Ramirez MD at 11/22/2017  5:48 AM        Only active medications are shown.     Scheduled Meds Sorted by Name for Gilda Galdamez as of 11/22/17 0887      Legend:                           Inactive     Active     Linked               Medications 11/22/17 11/23/17 11/24/17 11/25/17 11/26/17 11/27/17 11/28/17   enoxaparin (LOVENOX) syringe 40 mg  Dose: 40 mg Freq: Daily Route: SC  Start: 11/19/17 0900    Admin Instructions:   Give subcutaneous in abdomen only. Do not massage site after injection. Do not change dose time until  after 48 hrs.    0900 0900 0900 0900 0900 0900 0900          levothyroxine (SYNTHROID, LEVOTHROID) tablet 25 mcg  Dose: 25 mcg Freq: Daily Route: PO  Start: 11/19/17 0900    Admin Instructions:   Take on empty stomach.    0900 0900 0900 0900 0900 0900 0900          lisinopril (PRINIVIL,ZESTRIL) tablet 20 mg  Dose: 20 mg Freq: Daily Route: PO  Start: 11/19/17 0900 0900 0900 0900 0900 0900 0900 0900          naproxen (NAPROSYN) tablet 250 mg  Dose: 250 mg Freq: Daily Route: PO  Start: 11/19/17 0900    Admin Instructions:   If given for pain, use the following pain scale:  Mild Pain = Pain Score of 1-3, CPOT 1-2  Moderate Pain = Pain Score of 4-6, CPOT 3-4  Severe Pain = Pain Score of 7-10, CPOT 5-8    0900 0900 0900 0900 0900 0900 0900                Continuous Meds Sorted by Name for Gilda Galdamez as of 11/22/17 0837      Legend:         Inactive     Active     Linked               Medications 11/22/17 11/23/17 11/24/17 11/25/17 11/26/17 11/27/17 11/28/17   lactated ringers infusion  Rate: 125 mL/hr Freq: Continuous Route: IV  Start: 11/18/17 1500             sodium chloride 0.9 % infusion  Rate: 50 mL/hr Freq: Continuous Route: IV  Last Dose: 50 mL/hr (11/21/17 1345)  Start: 11/18/17 1345             sodium chloride 0.9 % infusion  Rate: 125 mL/hr Freq: Continuous Route: IV  Last Dose: 125 mL/hr (11/18/17 1138)  Start: 11/18/17 1118                   PRN Meds Sorted by Name for Gilda Galdamez as of 11/22/17 0837      Legend:         Inactive     Active     Linked               Medications 11/22/17 11/23/17 11/24/17 11/25/17 11/26/17 11/27/17 11/28/17   acetaminophen (TYLENOL) tablet 325 mg  Dose: 325 mg Freq: Every 4 Hours PRN Route: PO  PRN Reason: Fever  PRN Comment: >101  Start: 11/18/17 1900    Admin Instructions:   Do not exceed 4 grams of  acetaminophen in a 24 hr period.    If given for pain, use the following pain scale:   Mild Pain = Pain Score of 1-3, CPOT 1-2  Moderate Pain = Pain Score of 4-6, CPOT 3-4  Severe Pain = Pain Score of 7-10, CPOT 5-8             acetaminophen (TYLENOL) tablet 650 mg  Dose: 650 mg Freq: Every 4 Hours PRN Route: PO  PRN Reason: Mild Pain   Start: 11/18/17 1900    Admin Instructions:   Do not exceed 4 grams of acetaminophen in a 24 hr period.    If given for pain, use the following pain scale:   Mild Pain = Pain Score of 1-3, CPOT 1-2  Moderate Pain = Pain Score of 4-6, CPOT 3-4  Severe Pain = Pain Score of 7-10, CPOT 5-8             HYDROmorphone (DILAUDID) injection 0.5 mg  Dose: 0.5 mg Freq: Every 3 Hours PRN Route: IV  PRN Reasons: Moderate Pain ,Severe Pain   Start: 11/18/17 1301 End: 11/28/17 1300    Admin Instructions:   If given for pain, use the following pain scale:  Mild Pain = Pain Score of 1-3, CPOT 1-2  Moderate Pain = Pain Score of 4-6, CPOT 3-4  Severe Pain = Pain Score of 7-10, CPOT 5-8          1300-D/C'd      ondansetron (ZOFRAN) injection 4 mg  Dose: 4 mg Freq: Every 8 Hours PRN Route: IV  PRN Reasons: Nausea,Vomiting  Start: 11/21/17 1102             oxyCODONE-acetaminophen (PERCOCET) 5-325 MG per tablet 1 tablet  Dose: 1 tablet Freq: Every 6 Hours PRN Route: PO  PRN Comment: pain  Start: 11/19/17 1630    Admin Instructions:   Do not exceed 4 grams of acetaminophen in a 24 hr period.    If given for pain, use the following pain scale:   Mild Pain = Pain Score of 1-3, CPOT 1-2  Moderate Pain = Pain Score of 4-6, CPOT 3-4  Severe Pain = Pain Score of 7-10, CPOT 5-8    0235                Medications 11/22/17 11/23/17 11/24/17 11/25/17 11/26/17 11/27/17 11/28/17           Consult Notes (last 24 hours) (Notes from 11/21/2017  8:37 AM through 11/22/2017  8:37 AM)     No notes of this type exist for this encounter.

## 2017-11-22 NOTE — PLAN OF CARE
Problem: Orthopaedic Fracture (Adult)  Goal: Signs and Symptoms of Listed Potential Problems Will be Absent or Manageable (Orthopaedic Fracture)  Outcome: Ongoing (interventions implemented as appropriate)    Problem: Pain, Chronic (Adult)  Goal: Identify Related Risk Factors and Signs and Symptoms  Outcome: Ongoing (interventions implemented as appropriate)  Goal: Acceptable Pain Control/Comfort Level  Outcome: Ongoing (interventions implemented as appropriate)    Problem: Activity Intolerance (Adult)  Goal: Identify Related Risk Factors and Signs and Symptoms  Outcome: Ongoing (interventions implemented as appropriate)  Goal: Activity Tolerance  Outcome: Ongoing (interventions implemented as appropriate)  Goal: Effective Energy Conservation Techniques  Outcome: Ongoing (interventions implemented as appropriate)    Problem: Patient Care Overview (Adult)  Goal: Plan of Care Review  Outcome: Ongoing (interventions implemented as appropriate)    Problem: Fall Risk (Adult)  Goal: Identify Related Risk Factors and Signs and Symptoms  Outcome: Ongoing (interventions implemented as appropriate)  Goal: Absence of Falls  Outcome: Ongoing (interventions implemented as appropriate)    Problem: Skin Integrity Impairment, Risk/Actual (Adult)  Goal: Identify Related Risk Factors and Signs and Symptoms  Outcome: Ongoing (interventions implemented as appropriate)  Goal: Skin Integrity/Wound Healing  Outcome: Ongoing (interventions implemented as appropriate)

## 2017-11-23 LAB — BACTERIA SPEC AEROBE CULT: NORMAL

## 2017-11-23 PROCEDURE — 94799 UNLISTED PULMONARY SVC/PX: CPT

## 2017-11-23 PROCEDURE — 25010000002 ENOXAPARIN PER 10 MG: Performed by: ORTHOPAEDIC SURGERY

## 2017-11-23 RX ADMIN — LISINOPRIL 20 MG: 10 TABLET ORAL at 08:11

## 2017-11-23 RX ADMIN — LEVOTHYROXINE SODIUM 25 MCG: 75 TABLET ORAL at 08:11

## 2017-11-23 RX ADMIN — SODIUM CHLORIDE 50 ML/HR: 9 INJECTION, SOLUTION INTRAVENOUS at 03:43

## 2017-11-23 RX ADMIN — OXYCODONE HYDROCHLORIDE AND ACETAMINOPHEN 1 TABLET: 5; 325 TABLET ORAL at 16:12

## 2017-11-23 RX ADMIN — SODIUM CHLORIDE 50 ML/HR: 9 INJECTION, SOLUTION INTRAVENOUS at 23:53

## 2017-11-23 RX ADMIN — ENOXAPARIN SODIUM 40 MG: 40 INJECTION SUBCUTANEOUS at 08:11

## 2017-11-23 RX ADMIN — OXYCODONE HYDROCHLORIDE AND ACETAMINOPHEN 1 TABLET: 5; 325 TABLET ORAL at 22:18

## 2017-11-23 RX ADMIN — OXYCODONE HYDROCHLORIDE AND ACETAMINOPHEN 1 TABLET: 5; 325 TABLET ORAL at 10:03

## 2017-11-23 RX ADMIN — NAPROXEN 250 MG: 250 TABLET ORAL at 08:11

## 2017-11-23 RX ADMIN — OXYCODONE HYDROCHLORIDE AND ACETAMINOPHEN 1 TABLET: 5; 325 TABLET ORAL at 03:38

## 2017-11-23 NOTE — PLAN OF CARE
Problem: Orthopaedic Fracture (Adult)  Goal: Signs and Symptoms of Listed Potential Problems Will be Absent or Manageable (Orthopaedic Fracture)  Outcome: Ongoing (interventions implemented as appropriate)    11/23/17 1106   Orthopaedic Fracture   Problems Assessed (Orthopaedic Fracture) all   Problems Present (Orthopaedic Fracture) pain;skin integrity impairment         Problem: Pain, Chronic (Adult)  Goal: Identify Related Risk Factors and Signs and Symptoms  Outcome: Ongoing (interventions implemented as appropriate)  Goal: Acceptable Pain Control/Comfort Level  Outcome: Ongoing (interventions implemented as appropriate)    Problem: Activity Intolerance (Adult)  Goal: Identify Related Risk Factors and Signs and Symptoms  Outcome: Ongoing (interventions implemented as appropriate)  Goal: Activity Tolerance  Outcome: Ongoing (interventions implemented as appropriate)  Goal: Effective Energy Conservation Techniques  Outcome: Ongoing (interventions implemented as appropriate)    Problem: Patient Care Overview (Adult)  Goal: Plan of Care Review  Outcome: Ongoing (interventions implemented as appropriate)  Goal: Adult Individualization and Mutuality  Outcome: Ongoing (interventions implemented as appropriate)  Goal: Discharge Needs Assessment  Outcome: Ongoing (interventions implemented as appropriate)    Problem: Fall Risk (Adult)  Goal: Identify Related Risk Factors and Signs and Symptoms  Outcome: Ongoing (interventions implemented as appropriate)  Goal: Absence of Falls  Outcome: Ongoing (interventions implemented as appropriate)    Problem: Skin Integrity Impairment, Risk/Actual (Adult)  Goal: Identify Related Risk Factors and Signs and Symptoms  Outcome: Ongoing (interventions implemented as appropriate)  Goal: Skin Integrity/Wound Healing  Outcome: Ongoing (interventions implemented as appropriate)

## 2017-11-23 NOTE — PLAN OF CARE
Problem: Pain, Chronic (Adult)  Goal: Identify Related Risk Factors and Signs and Symptoms  Outcome: Ongoing (interventions implemented as appropriate)    11/22/17 1329   Pain, Chronic   Related Risk Factors (Chronic Pain) surgery;knowledge deficit   Signs and Symptoms (Chronic Pain) impaired thought process/concentration;verbalization of pain descriptors       Goal: Acceptable Pain Control/Comfort Level  Outcome: Ongoing (interventions implemented as appropriate)    11/22/17 2255   Pain, Chronic (Adult)   Acceptable Pain Control/Comfort Level making progress toward outcome         Problem: Activity Intolerance (Adult)  Goal: Identify Related Risk Factors and Signs and Symptoms  Outcome: Ongoing (interventions implemented as appropriate)    11/22/17 1329   Activity Intolerance   Activity Intolerance: Related Risk Factors generalized weakness;medication side effects;pain   Signs and Symptoms (Activity Intolerance) pain/discomfort       Goal: Activity Tolerance  Outcome: Ongoing (interventions implemented as appropriate)    11/22/17 2255   Activity Intolerance (Adult)   Activity Tolerance making progress toward outcome       Goal: Effective Energy Conservation Techniques  Outcome: Ongoing (interventions implemented as appropriate)    11/22/17 2255   Activity Intolerance (Adult)   Effective Energy Conservation Techniques making progress toward outcome         Problem: Fall Risk (Adult)  Goal: Identify Related Risk Factors and Signs and Symptoms  Outcome: Ongoing (interventions implemented as appropriate)    11/22/17 1329   Fall Risk   Fall Risk: Related Risk Factors environment unfamiliar;history of falls   Fall Risk: Signs and Symptoms presence of risk factors       Goal: Absence of Falls  Outcome: Ongoing (interventions implemented as appropriate)    11/22/17 2255   Fall Risk (Adult)   Absence of Falls making progress toward outcome         Problem: Skin Integrity Impairment, Risk/Actual (Adult)  Goal: Identify  Related Risk Factors and Signs and Symptoms  Outcome: Ongoing (interventions implemented as appropriate)    11/22/17 1329   Skin Integrity Impairment, Risk/Actual   Skin Integrity Impairment, Risk/Actual: Related Risk Factors surgery/procedure       Goal: Skin Integrity/Wound Healing  Outcome: Ongoing (interventions implemented as appropriate)    11/22/17 5174   Skin Integrity Impairment, Risk/Actual (Adult)   Skin Integrity/Wound Healing making progress toward outcome

## 2017-11-23 NOTE — PROGRESS NOTES
106       Gilda Galdamez       LOS: 5 days   Patient Care Team:  Sukhdev Ramirez MD as PCP - General (Family Medicine)      Subjective     Interval History:     Patient Complaints: No complaints this morning resting comfortably  Patient Denies:    History taken from: patient    Review of Systems:    All systems were reviewed and negative     Objective     Vital Signs  Vital Signs (last 72 hrs)       11/19 0700  -  11/20 0659 11/20 0700  -  11/21 0659 11/21 0700  -  11/22 0659 11/22 0700  -  11/23 0651   Most Recent    Temp (°F) 97.8 -  97.9    98.2 -  98.6    97.6 -  98.2    97.8 -  97.9     97.9 (36.6)    Heart Rate 67 -  79    64 -  80    70 -  80    65 -  72     65    Resp 16 -  18      16    18 -  20    18 -  20     20    /57 -  (!) 192/70    148/52 -  156/59    138/60 -  178/50    149/50 -  160/57     149/50    SpO2 (%)   97    94 -  97    95 -  97    93 -  94     94          Intake & Output (last 3 days)       11/20 0701 - 11/21 0700 11/21 0701 - 11/22 0700 11/22 0701 - 11/23 0700    P.O. 1900 1200 1200    I.V. (mL/kg) 975 (13.1)  1000 (13.4)    Total Intake(mL/kg) 2875 (38.5) 1200 (16.1) 2200 (29.5)    Net +2875 +1200 +2200           Unmeasured Urine Occurrence 8 x 5 x 8 x    Unmeasured Stool Occurrence   0 x          Physical Exam:     General Appearance:    Alert, cooperative, in no acute distress   Head:    Normocephalic, without obvious abnormality, atraumatic   Eyes:            Lids and lashes normal, conjunctivae and sclerae normal, no   icterus, no pallor, corneas clear, PERRLA   Ears:    Ears appear intact with no abnormalities noted   Throat:   No oral lesions, no thrush, oral mucosa moist   Neck:   No adenopathy, supple, trachea midline, no thyromegaly, no     carotid bruit, no JVD   Back:     No kyphosis present, no scoliosis present, no skin lesions,       erythema or scars, no tenderness to percussion or                   palpation,   range of motion normal   Lungs:     Clear to  auscultation,respirations regular, even and                   unlabored    Heart:    Regular rhythm and normal rate, normal S1 and S2, no            murmur, no gallop, no rub, no click   Breast Exam:    Deferred   Abdomen:     Normal bowel sounds, no masses, no organomegaly, soft        non-tender, non-distended, no guarding, no rebound                 tenderness   Genitalia:    Deferred   Extremities:   Moves all extremities well, no edema, no cyanosis, no              Redness right leg partially immobilized limited range of motion secondary to surgery    Pulses:   Pulses palpable and equal bilaterally   Skin:   No bleeding, bruising or rash   Lymph nodes:   No palpable adenopathy   Neurologic:   Cranial nerves 2 - 12 grossly intact, sensation intact, DTR        present and equal bilaterally     Lab Results (last 24 hours)     Procedure Component Value Units Date/Time    Blood Culture - Blood, [[id number]]  (Normal) Collected:  11/18/17 1910    Specimen:  Blood from Arm, Left Updated:  11/22/17 1946     Blood Culture No growth at 4 days        Imaging Results (last 24 hours)     ** No results found for the last 24 hours. **        Hospital Medications (active)       Dose Frequency Start End    acetaminophen (TYLENOL) tablet 325 mg 325 mg Every 4 Hours PRN 11/18/2017     Sig - Route: Take 1 tablet by mouth Every 4 (Four) Hours As Needed for Fever (>101). - Oral    acetaminophen (TYLENOL) tablet 650 mg 650 mg Every 4 Hours PRN 11/18/2017     Sig - Route: Take 2 tablets by mouth Every 4 (Four) Hours As Needed for Mild Pain . - Oral    enoxaparin (LOVENOX) syringe 40 mg 40 mg Daily 11/19/2017     Sig - Route: Inject 0.4 mL under the skin Daily. - Subcutaneous    HYDROmorphone (DILAUDID) injection 0.5 mg 0.5 mg Every 3 Hours PRN 11/18/2017 11/28/2017    Sig - Route: Infuse 0.5 mL into a venous catheter Every 3 (Three) Hours As Needed for Moderate Pain  or Severe Pain . - Intravenous    lactated ringers infusion 125 mL/hr  Continuous 11/18/2017     Sig - Route: Infuse 125 mL/hr into a venous catheter Continuous. - Intravenous    levothyroxine (SYNTHROID, LEVOTHROID) tablet 25 mcg 25 mcg Daily 11/19/2017     Sig - Route: Take 1 tablet by mouth Daily. - Oral    lisinopril (PRINIVIL,ZESTRIL) tablet 20 mg 20 mg Daily 11/19/2017     Sig - Route: Take 2 tablets by mouth Daily. - Oral    naproxen (NAPROSYN) tablet 250 mg 250 mg Daily 11/19/2017     Sig - Route: Take 1 tablet by mouth Daily. - Oral    ondansetron (ZOFRAN) injection 4 mg 4 mg Every 8 Hours PRN 11/21/2017     Sig - Route: Infuse 2 mL into a venous catheter Every 8 (Eight) Hours As Needed for Nausea or Vomiting. - Intravenous    oxyCODONE-acetaminophen (PERCOCET) 5-325 MG per tablet 1 tablet 1 tablet Every 6 Hours PRN 11/19/2017     Sig - Route: Take 1 tablet by mouth Every 6 (Six) Hours As Needed (pain). - Oral    sodium chloride 0.9 % infusion 125 mL/hr Continuous 11/18/2017     Sig - Route: Infuse 125 mL/hr into a venous catheter Continuous. - Intravenous    Cosign for Ordering: Accepted by Ramana Bass MD on 11/18/2017 11:32 AM    sodium chloride 0.9 % infusion 50 mL/hr Continuous 11/18/2017     Sig - Route: Infuse 50 mL/hr into a venous catheter Continuous. - Intravenous           Results Review:     I reviewed the patient's new clinical results.    Medications Reviewed    Assessment/Plan   1.  Right femur fracture postop day 6 ORIF doing well patient will be moved to rehabilitation on Friday for  note  2.  Essential hypertension patient continues with good control  3.  Hypothyroidism  Principal Problem:    Closed displaced transverse fracture of shaft of right femur  Active Problems:    Femur fracture, right              CAROLYN Rushing  11/23/17  6:51 AM

## 2017-11-24 ENCOUNTER — HOSPITAL ENCOUNTER (INPATIENT)
Facility: HOSPITAL | Age: 62
LOS: 6 days | Discharge: HOME HEALTH | End: 2017-11-30
Attending: FAMILY MEDICINE | Admitting: FAMILY MEDICINE

## 2017-11-24 VITALS
RESPIRATION RATE: 16 BRPM | WEIGHT: 164.44 LBS | HEIGHT: 67 IN | BODY MASS INDEX: 25.81 KG/M2 | SYSTOLIC BLOOD PRESSURE: 185 MMHG | OXYGEN SATURATION: 98 % | TEMPERATURE: 98 F | DIASTOLIC BLOOD PRESSURE: 71 MMHG | HEART RATE: 70 BPM

## 2017-11-24 DIAGNOSIS — S72.321A CLOSED DISPLACED TRANSVERSE FRACTURE OF SHAFT OF RIGHT FEMUR, INITIAL ENCOUNTER (HCC): Primary | ICD-10-CM

## 2017-11-24 PROBLEM — R53.81 DEBILITY: Status: ACTIVE | Noted: 2017-11-24

## 2017-11-24 LAB
ANION GAP SERPL CALCULATED.3IONS-SCNC: 6 MMOL/L (ref 3.6–11.2)
BASOPHILS # BLD AUTO: 0.02 10*3/MM3 (ref 0–0.3)
BASOPHILS NFR BLD AUTO: 0.4 % (ref 0–2)
BUN BLD-MCNC: 15 MG/DL (ref 7–21)
BUN/CREAT SERPL: 19.2 (ref 7–25)
CALCIUM SPEC-SCNC: 8.3 MG/DL (ref 7.7–10)
CHLORIDE SERPL-SCNC: 111 MMOL/L (ref 99–112)
CO2 SERPL-SCNC: 25 MMOL/L (ref 24.3–31.9)
CREAT BLD-MCNC: 0.78 MG/DL (ref 0.43–1.29)
CRP SERPL-MCNC: 1.56 MG/DL (ref 0–0.99)
DEPRECATED RDW RBC AUTO: 45 FL (ref 37–54)
EOSINOPHIL # BLD AUTO: 0.19 10*3/MM3 (ref 0–0.7)
EOSINOPHIL NFR BLD AUTO: 3.3 % (ref 0–5)
ERYTHROCYTE [DISTWIDTH] IN BLOOD BY AUTOMATED COUNT: 13.8 % (ref 11.5–14.5)
GFR SERPL CREATININE-BSD FRML MDRD: 75 ML/MIN/1.73
GLUCOSE BLD-MCNC: 90 MG/DL (ref 70–110)
HCT VFR BLD AUTO: 32.1 % (ref 37–47)
HGB BLD-MCNC: 9.9 G/DL (ref 12–16)
IMM GRANULOCYTES # BLD: 0.02 10*3/MM3 (ref 0–0.03)
IMM GRANULOCYTES NFR BLD: 0.4 % (ref 0–0.5)
LYMPHOCYTES # BLD AUTO: 1.96 10*3/MM3 (ref 1–3)
LYMPHOCYTES NFR BLD AUTO: 34.3 % (ref 21–51)
MCH RBC QN AUTO: 29.2 PG (ref 27–33)
MCHC RBC AUTO-ENTMCNC: 30.8 G/DL (ref 33–37)
MCV RBC AUTO: 94.7 FL (ref 80–94)
MONOCYTES # BLD AUTO: 0.52 10*3/MM3 (ref 0.1–0.9)
MONOCYTES NFR BLD AUTO: 9.1 % (ref 0–10)
NEUTROPHILS # BLD AUTO: 3 10*3/MM3 (ref 1.4–6.5)
NEUTROPHILS NFR BLD AUTO: 52.5 % (ref 30–70)
OSMOLALITY SERPL CALC.SUM OF ELEC: 283.5 MOSM/KG (ref 273–305)
PLATELET # BLD AUTO: 276 10*3/MM3 (ref 130–400)
PMV BLD AUTO: 11.2 FL (ref 6–10)
POTASSIUM BLD-SCNC: 4.6 MMOL/L (ref 3.5–5.3)
RBC # BLD AUTO: 3.39 10*6/MM3 (ref 4.2–5.4)
SODIUM BLD-SCNC: 142 MMOL/L (ref 135–153)
WBC NRBC COR # BLD: 5.71 10*3/MM3 (ref 4.5–12.5)

## 2017-11-24 PROCEDURE — 94799 UNLISTED PULMONARY SVC/PX: CPT

## 2017-11-24 PROCEDURE — 85025 COMPLETE CBC W/AUTO DIFF WBC: CPT | Performed by: PHYSICIAN ASSISTANT

## 2017-11-24 PROCEDURE — 86140 C-REACTIVE PROTEIN: CPT | Performed by: PHYSICIAN ASSISTANT

## 2017-11-24 PROCEDURE — 80048 BASIC METABOLIC PNL TOTAL CA: CPT | Performed by: PHYSICIAN ASSISTANT

## 2017-11-24 PROCEDURE — 25010000002 ENOXAPARIN PER 10 MG: Performed by: ORTHOPAEDIC SURGERY

## 2017-11-24 RX ORDER — ACETAMINOPHEN 325 MG/1
650 TABLET ORAL EVERY 4 HOURS PRN
Status: DISCONTINUED | OUTPATIENT
Start: 2017-11-24 | End: 2017-11-30 | Stop reason: HOSPADM

## 2017-11-24 RX ORDER — ONDANSETRON 2 MG/ML
4 INJECTION INTRAMUSCULAR; INTRAVENOUS EVERY 6 HOURS PRN
Status: DISCONTINUED | OUTPATIENT
Start: 2017-11-24 | End: 2017-11-30 | Stop reason: HOSPADM

## 2017-11-24 RX ORDER — SODIUM CHLORIDE 9 MG/ML
50 INJECTION, SOLUTION INTRAVENOUS CONTINUOUS
Status: CANCELLED | OUTPATIENT
Start: 2017-11-24

## 2017-11-24 RX ORDER — ONDANSETRON 2 MG/ML
4 INJECTION INTRAMUSCULAR; INTRAVENOUS EVERY 8 HOURS PRN
Status: CANCELLED | OUTPATIENT
Start: 2017-11-24

## 2017-11-24 RX ORDER — SODIUM CHLORIDE, SODIUM LACTATE, POTASSIUM CHLORIDE, CALCIUM CHLORIDE 600; 310; 30; 20 MG/100ML; MG/100ML; MG/100ML; MG/100ML
125 INJECTION, SOLUTION INTRAVENOUS CONTINUOUS
Status: CANCELLED | OUTPATIENT
Start: 2017-11-24

## 2017-11-24 RX ORDER — ACETAMINOPHEN 325 MG/1
325 TABLET ORAL EVERY 4 HOURS PRN
Status: DISCONTINUED | OUTPATIENT
Start: 2017-11-24 | End: 2017-11-30 | Stop reason: HOSPADM

## 2017-11-24 RX ORDER — LEVOTHYROXINE SODIUM 0.03 MG/1
25 TABLET ORAL DAILY
Status: CANCELLED | OUTPATIENT
Start: 2017-11-24

## 2017-11-24 RX ORDER — HYDROMORPHONE HYDROCHLORIDE 1 MG/ML
0.5 INJECTION, SOLUTION INTRAMUSCULAR; INTRAVENOUS; SUBCUTANEOUS
Status: CANCELLED | OUTPATIENT
Start: 2017-11-24 | End: 2017-11-28

## 2017-11-24 RX ORDER — LEVOTHYROXINE SODIUM 0.03 MG/1
25 TABLET ORAL DAILY
Status: DISCONTINUED | OUTPATIENT
Start: 2017-11-25 | End: 2017-11-30 | Stop reason: HOSPADM

## 2017-11-24 RX ORDER — BISACODYL 10 MG
10 SUPPOSITORY, RECTAL RECTAL DAILY PRN
Status: DISCONTINUED | OUTPATIENT
Start: 2017-11-24 | End: 2017-11-30 | Stop reason: HOSPADM

## 2017-11-24 RX ORDER — OXYCODONE HYDROCHLORIDE AND ACETAMINOPHEN 5; 325 MG/1; MG/1
1 TABLET ORAL EVERY 6 HOURS PRN
Status: DISCONTINUED | OUTPATIENT
Start: 2017-11-24 | End: 2017-11-30 | Stop reason: HOSPADM

## 2017-11-24 RX ORDER — NAPROXEN 250 MG/1
250 TABLET ORAL DAILY
Status: DISCONTINUED | OUTPATIENT
Start: 2017-11-25 | End: 2017-11-30 | Stop reason: HOSPADM

## 2017-11-24 RX ORDER — ACETAMINOPHEN 325 MG/1
325 TABLET ORAL EVERY 4 HOURS PRN
Status: CANCELLED | OUTPATIENT
Start: 2017-11-24

## 2017-11-24 RX ORDER — ONDANSETRON 4 MG/1
4 TABLET, ORALLY DISINTEGRATING ORAL EVERY 6 HOURS PRN
Status: DISCONTINUED | OUTPATIENT
Start: 2017-11-24 | End: 2017-11-30 | Stop reason: HOSPADM

## 2017-11-24 RX ORDER — OXYCODONE HYDROCHLORIDE AND ACETAMINOPHEN 5; 325 MG/1; MG/1
1 TABLET ORAL EVERY 6 HOURS PRN
Status: CANCELLED | OUTPATIENT
Start: 2017-11-24

## 2017-11-24 RX ORDER — ONDANSETRON 4 MG/1
4 TABLET, FILM COATED ORAL EVERY 6 HOURS PRN
Status: DISCONTINUED | OUTPATIENT
Start: 2017-11-24 | End: 2017-11-30 | Stop reason: HOSPADM

## 2017-11-24 RX ORDER — SODIUM CHLORIDE 9 MG/ML
125 INJECTION, SOLUTION INTRAVENOUS CONTINUOUS
Status: CANCELLED | OUTPATIENT
Start: 2017-11-24

## 2017-11-24 RX ORDER — LISINOPRIL 10 MG/1
20 TABLET ORAL DAILY
Status: DISCONTINUED | OUTPATIENT
Start: 2017-11-25 | End: 2017-11-30 | Stop reason: HOSPADM

## 2017-11-24 RX ORDER — ACETAMINOPHEN 325 MG/1
650 TABLET ORAL EVERY 4 HOURS PRN
Status: CANCELLED | OUTPATIENT
Start: 2017-11-24

## 2017-11-24 RX ORDER — ONDANSETRON 2 MG/ML
4 INJECTION INTRAMUSCULAR; INTRAVENOUS EVERY 8 HOURS PRN
Status: DISCONTINUED | OUTPATIENT
Start: 2017-11-24 | End: 2017-11-30 | Stop reason: HOSPADM

## 2017-11-24 RX ORDER — NAPROXEN 250 MG/1
250 TABLET ORAL DAILY
Status: CANCELLED | OUTPATIENT
Start: 2017-11-24

## 2017-11-24 RX ORDER — LISINOPRIL 10 MG/1
20 TABLET ORAL DAILY
Status: CANCELLED | OUTPATIENT
Start: 2017-11-24

## 2017-11-24 RX ADMIN — NAPROXEN 250 MG: 250 TABLET ORAL at 08:34

## 2017-11-24 RX ADMIN — LISINOPRIL 20 MG: 10 TABLET ORAL at 08:34

## 2017-11-24 RX ADMIN — ENOXAPARIN SODIUM 40 MG: 40 INJECTION SUBCUTANEOUS at 08:34

## 2017-11-24 RX ADMIN — OXYCODONE HYDROCHLORIDE AND ACETAMINOPHEN 1 TABLET: 5; 325 TABLET ORAL at 21:50

## 2017-11-24 RX ADMIN — OXYCODONE HYDROCHLORIDE AND ACETAMINOPHEN 1 TABLET: 5; 325 TABLET ORAL at 05:08

## 2017-11-24 RX ADMIN — LEVOTHYROXINE SODIUM 25 MCG: 75 TABLET ORAL at 08:34

## 2017-11-24 RX ADMIN — OXYCODONE HYDROCHLORIDE AND ACETAMINOPHEN 1 TABLET: 5; 325 TABLET ORAL at 14:03

## 2017-11-24 NOTE — DISCHARGE SUMMARY
Date of Discharge:  11/24/2017    Discharge Diagnosis: Right femur fracture(postop day 7)                                          ORIF of the right femur                                            Essential hypertension                                             Hypothyroidism      Presenting Problem/History of Present Illness  Femur fracture, right [S72.91XA]       Hospital Course  Patient is a 62 y.o. female presented with right femur fracture on the 19th patient underwent ORIF of the right femur.  Surgery without complications by orthopedics Dr. Cifuentes.  Patient is been followed postoperatively from the last 7 days been monitored by her medical problems hypertension hypothyroidism is been followed by physical therapy and had nursing care.  Her course was complicated by elevated blood pressure is been monitored postoperatively for any signs of infection she's been followed by a rehabilitative services as a bed is become available the patient will now be transferred to acute rehabilitation for continued treatment and therapy.  Patient is stable at this point she's afebrile her vital signs are stable and we'll monitor closely for her blood pressure in rehabilitative services patient will be transferred there this morning.     Procedures Performed  Procedure(s):  FEMUR OPEN REDUCTION INTERNAL FIXATION       Consults:   Consults     Date and Time Order Name Status Description    11/18/2017 1108 Ortho (on-call MD unless specified)      11/18/2017 1107 Family Practice (T James/JANET Ramirez/Justino)            Pertinent Test Results   Lab Results (last 72 hours)     Procedure Component Value Units Date/Time    Blood Culture - Blood, [[id number]]  (Normal) Collected:  11/18/17 1910    Specimen:  Blood from Arm, Left Updated:  11/23/17 1946     Blood Culture No growth at 5 days    C-reactive Protein [[id number]]  (Abnormal) Collected:  11/24/17 0051    Specimen:  Blood Updated:  11/24/17 0309     C-Reactive Protein 1.56 (H)  mg/dL     Basic Metabolic Panel [[id number]]  (Normal) Collected:  11/24/17 0051    Specimen:  Blood Updated:  11/24/17 0309     Glucose 90 mg/dL      BUN 15 mg/dL      Creatinine 0.78 mg/dL      Sodium 142 mmol/L      Potassium 4.6 mmol/L      Chloride 111 mmol/L      CO2 25.0 mmol/L      Calcium 8.3 mg/dL      eGFR Non African Amer 75 mL/min/1.73      BUN/Creatinine Ratio 19.2     Anion Gap 6.0 mmol/L     Narrative:       GFR Normal >60  Chronic Kidney Disease <60  Kidney Failure <15    Osmolality, Calculated [[id number]]  (Normal) Collected:  11/24/17 0051    Specimen:  Blood Updated:  11/24/17 0309     Osmolality Calc 283.5 mOsm/kg     CBC & Differential [[id number]] Collected:  11/24/17 0051    Specimen:  Blood Updated:  11/24/17 0321    Narrative:       The following orders were created for panel order CBC & Differential.  Procedure                               Abnormality         Status                     ---------                               -----------         ------                     CBC Auto Differential[[id number]]        Abnormal            Final result                 Please view results for these tests on the individual orders.    CBC Auto Differential [[id number]]  (Abnormal) Collected:  11/24/17 0051    Specimen:  Blood Updated:  11/24/17 0321     WBC 5.71 10*3/mm3      RBC 3.39 (L) 10*6/mm3      Hemoglobin 9.9 (L) g/dL      Hematocrit 32.1 (L) %      MCV 94.7 (H) fL      MCH 29.2 pg      MCHC 30.8 (L) g/dL      RDW 13.8 %      RDW-SD 45.0 fl      MPV 11.2 (H) fL      Platelets 276 10*3/mm3      Neutrophil % 52.5 %      Lymphocyte % 34.3 %      Monocyte % 9.1 %      Eosinophil % 3.3 %      Basophil % 0.4 %      Immature Grans % 0.4 %      Neutrophils, Absolute 3.00 10*3/mm3      Lymphocytes, Absolute 1.96 10*3/mm3      Monocytes, Absolute 0.52 10*3/mm3      Eosinophils, Absolute 0.19 10*3/mm3      Basophils, Absolute 0.02 10*3/mm3      Immature Grans, Absolute 0.02 10*3/mm3         Imaging Results  (last 72 hours)     ** No results found for the last 72 hours. **          Condition on Discharge:  Stable    Vital Signs  Temp:  [98.1 °F (36.7 °C)-98.9 °F (37.2 °C)] 98.2 °F (36.8 °C)  Heart Rate:  [63-67] 64  Resp:  [16-20] 16  BP: (130-172)/(42-68) 146/62    Physical Exam:     General Appearance:    Alert, cooperative, in no acute distress   Head:    Normocephalic, without obvious abnormality, atraumatic   Eyes:            Lids and lashes normal, conjunctivae and sclerae normal, no   icterus, no pallor, corneas clear, PERRLA   Ears:    Ears appear intact with no abnormalities noted   Throat:   No oral lesions, no thrush, oral mucosa moist   Neck:   No adenopathy, supple, trachea midline, no thyromegaly, no     carotid bruit, no JVD   Back:     No kyphosis present, no scoliosis present, no skin lesions,       erythema or scars, no tenderness to percussion or                   palpation,   range of motion normal   Lungs:     Clear to auscultation,respirations regular, even and                   unlabored    Heart:    Regular rhythm and normal rate, normal S1 and S2, no            murmur, no gallop, no rub, no click   Breast Exam:    Deferred   Abdomen:     Normal bowel sounds, no masses, no organomegaly, soft        non-tender, non-distended, no guarding, no rebound                 tenderness   Genitalia:    Deferred   Extremities:   Moves all extremities well, no edema, no cyanosis, no              redness   Pulses:   Pulses palpable and equal bilaterally   Skin:   No bleeding, bruising or rash   Lymph nodes:   No palpable adenopathy   Neurologic:   Cranial nerves 2 - 12 grossly intact, sensation intact, DTR        present and equal bilaterally       Discharge Disposition  Rehab Facility or Unit ([facility])    Discharge Medications   Galdamez Gilda A   Home Medication Instructions DESHAUN:[id number]    Printed on:11/24/17 0654   Medication Information                      levothyroxine (SYNTHROID,  LEVOTHROID) 25 MCG tablet  Take 25 mcg by mouth Daily.             lisinopril (PRINIVIL,ZESTRIL) 20 MG tablet  Take 20 mg by mouth Daily.             naproxen sodium (ALEVE) 220 MG tablet  Take 220 mg by mouth Daily.                 Discharge Diet:  regular as tolerated    Activity at Discharge:  as tolerated    Follow-up Appointments need follow-up appointment with Dr. Sukhdev Ramirez after being discharged from rehabilitation  No future appointments.      Test Results Pending at Discharge       CAROLYN Rushing  11/24/17  6:54 AM

## 2017-11-24 NOTE — PLAN OF CARE
Problem: Orthopaedic Fracture (Adult)  Goal: Signs and Symptoms of Listed Potential Problems Will be Absent or Manageable (Orthopaedic Fracture)  Outcome: Ongoing (interventions implemented as appropriate)    Problem: Pain, Chronic (Adult)  Goal: Identify Related Risk Factors and Signs and Symptoms  Outcome: Ongoing (interventions implemented as appropriate)  Goal: Acceptable Pain Control/Comfort Level  Outcome: Ongoing (interventions implemented as appropriate)    Problem: Activity Intolerance (Adult)  Goal: Identify Related Risk Factors and Signs and Symptoms  Outcome: Ongoing (interventions implemented as appropriate)  Goal: Activity Tolerance  Outcome: Ongoing (interventions implemented as appropriate)  Goal: Effective Energy Conservation Techniques  Outcome: Ongoing (interventions implemented as appropriate)    Problem: Patient Care Overview (Adult)  Goal: Plan of Care Review  Outcome: Ongoing (interventions implemented as appropriate)  Goal: Adult Individualization and Mutuality  Outcome: Ongoing (interventions implemented as appropriate)  Goal: Discharge Needs Assessment  Outcome: Ongoing (interventions implemented as appropriate)    Problem: Fall Risk (Adult)  Goal: Identify Related Risk Factors and Signs and Symptoms  Outcome: Ongoing (interventions implemented as appropriate)  Goal: Absence of Falls  Outcome: Ongoing (interventions implemented as appropriate)    Problem: Skin Integrity Impairment, Risk/Actual (Adult)  Goal: Identify Related Risk Factors and Signs and Symptoms  Outcome: Ongoing (interventions implemented as appropriate)  Goal: Skin Integrity/Wound Healing  Outcome: Ongoing (interventions implemented as appropriate)

## 2017-11-24 NOTE — PLAN OF CARE
Problem: Orthopaedic Fracture (Adult)  Goal: Signs and Symptoms of Listed Potential Problems Will be Absent or Manageable (Orthopaedic Fracture)  Outcome: Ongoing (interventions implemented as appropriate)    Problem: Pain, Chronic (Adult)  Goal: Identify Related Risk Factors and Signs and Symptoms  Outcome: Ongoing (interventions implemented as appropriate)  Goal: Acceptable Pain Control/Comfort Level  Outcome: Ongoing (interventions implemented as appropriate)    Problem: Activity Intolerance (Adult)  Goal: Identify Related Risk Factors and Signs and Symptoms  Outcome: Ongoing (interventions implemented as appropriate)  Goal: Activity Tolerance  Outcome: Ongoing (interventions implemented as appropriate)    Problem: Patient Care Overview (Adult)  Goal: Plan of Care Review  Outcome: Ongoing (interventions implemented as appropriate)    Problem: Fall Risk (Adult)  Goal: Identify Related Risk Factors and Signs and Symptoms  Outcome: Ongoing (interventions implemented as appropriate)    Problem: Skin Integrity Impairment, Risk/Actual (Adult)  Goal: Identify Related Risk Factors and Signs and Symptoms  Outcome: Ongoing (interventions implemented as appropriate)

## 2017-11-24 NOTE — PROGRESS NOTES
Discharge Planning Assessment  YADIEL Cox     Patient Name: Gilda Galdamez  MRN: [id number]  Today's Date: 11/24/2017    Admit Date: 11/18/2017       Discharge Plan       11/24/17 1414    Final Note    Final Note Pt was discharged to [facility] acute rehab today. No other needs identified.         Discharge Placement     Facility/Agency Request Status Selected? Address Phone Number Fax Number     [facility]ze[facility] Accepted     [address] [facility] [place] [address] [phone]         Expected Discharge Date and Time     Expected Discharge Date Expected Discharge Time    Nov 24, 2017             Marlene Glasgow

## 2017-11-24 NOTE — PAYOR COMM NOTE
"CONTACT:  BASHIR SHEPPARD RN, BSN  UTILIZATION MANAGEMENT DEPT.  [facility]  [address] [facility], [address]  PHONE:  [phone]  FAX: [phone]    Clinical update for 11/23/17. Refer to Claim # [id number]. Please note that patient discharged to rehab facility on this date, 11/24/17.    Chetna Galdamez (62 y.o. Female)     Date of Birth Social Security Number Address Home Phone MRN    1955  [address] [place] [id number] [phone] [id number]    Sabianism Marital Status          Lutheran        Admission Date Admission Type Admitting Provider Attending Provider Department, Room/Bed    11/18/17 Emergency Sukhdev Ramirez MD  [address], 3330/1P    Discharge Date Discharge Disposition Discharge Destination        11/24/2017 Rehab Facility or Unit ([facility])             Attending Provider: (none)    Allergies:  No Known Allergies    Isolation:  None   Infection:  None   Code Status:  FULL    Ht:  67\" (170.2 cm)   Wt:  164 lb 7 oz (74.6 kg)    Admission Cmt:  None   Principal Problem:  Closed displaced transverse fracture of shaft of right femur [S72.321A] More...                 Active Insurance as of 11/18/2017     Primary Coverage     Payor Plan Insurance Group Employer/Plan Group    WORKERS COMPENSATION MISC WORKERS COMPENSATION      Coverage Address Coverage Phone Number Effective From Effective To    [address] [address] [phone] 11/18/2017     [place] [address]       Subscriber Name Subscriber Birth Date Member ID       CHETNA GALDAMEZ [id number]                 Emergency Contacts      (Rel.) Home Phone Work Phone Mobile Phone    George Galdamez (Friend) [phone] -- --            Vital Signs (last 24 hours)       11/23 0700  -  11/24 0659 11/24 0700  -  11/24 0948   Most Recent    Temp (°F) 98.1 -  98.9    97.7 -  98     97.7 (36.5)    Heart Rate 63 -  67    70 -  85     85    Resp 16 -  20    16 -  20     20    /42 -  " 172/58    (!) 185/71 -  (!) 187/70     (!) 187/70    SpO2 (%) 94 -  95    92 -  98     92          Intake & Output (last day)       11/23 0701 - 11/24 0700 11/24 0701 - 11/25 0700    P.O. 1200     I.V. (mL/kg)      Total Intake(mL/kg) 1200 (16.1)     Net +1200            Unmeasured Urine Occurrence 6 x         Hospital Medications (active)       Dose Frequency Start End    acetaminophen (TYLENOL) tablet 325 mg (Discontinued) 325 mg Every 4 Hours PRN 11/18/2017 11/24/2017    Sig - Route: Take 1 tablet by mouth Every 4 (Four) Hours As Needed for Fever (>101). - Oral    Reason for Discontinue: Patient Discharge    acetaminophen (TYLENOL) tablet 650 mg (Discontinued) 650 mg Every 4 Hours PRN 11/18/2017 11/24/2017    Sig - Route: Take 2 tablets by mouth Every 4 (Four) Hours As Needed for Mild Pain . - Oral    Reason for Discontinue: Patient Discharge    enoxaparin (LOVENOX) syringe 40 mg (Discontinued) 40 mg Daily 11/19/2017 11/24/2017    Sig - Route: Inject 0.4 mL under the skin Daily. - Subcutaneous    Reason for Discontinue: Patient Discharge    HYDROmorphone (DILAUDID) injection 0.5 mg (Discontinued) 0.5 mg Every 3 Hours PRN 11/18/2017 11/24/2017    Sig - Route: Infuse 0.5 mL into a venous catheter Every 3 (Three) Hours As Needed for Moderate Pain  or Severe Pain . - Intravenous    Reason for Discontinue: Patient Discharge    lactated ringers infusion (Discontinued) 125 mL/hr Continuous 11/18/2017 11/24/2017    Sig - Route: Infuse 125 mL/hr into a venous catheter Continuous. - Intravenous    Reason for Discontinue: Patient Discharge    levothyroxine (SYNTHROID, LEVOTHROID) tablet 25 mcg (Discontinued) 25 mcg Daily 11/19/2017 11/24/2017    Sig - Route: Take 1 tablet by mouth Daily. - Oral    Reason for Discontinue: Patient Discharge    lisinopril (PRINIVIL,ZESTRIL) tablet 20 mg (Discontinued) 20 mg Daily 11/19/2017 11/24/2017    Sig - Route: Take 2 tablets by mouth Daily. - Oral    Reason for Discontinue: Patient  Discharge    naproxen (NAPROSYN) tablet 250 mg (Discontinued) 250 mg Daily 11/19/2017 11/24/2017    Sig - Route: Take 1 tablet by mouth Daily. - Oral    Reason for Discontinue: Patient Discharge    ondansetron (ZOFRAN) injection 4 mg (Discontinued) 4 mg Every 8 Hours PRN 11/21/2017 11/24/2017    Sig - Route: Infuse 2 mL into a venous catheter Every 8 (Eight) Hours As Needed for Nausea or Vomiting. - Intravenous    Reason for Discontinue: Patient Discharge    oxyCODONE-acetaminophen (PERCOCET) 5-325 MG per tablet 1 tablet (Discontinued) 1 tablet Every 6 Hours PRN 11/19/2017 11/24/2017    Sig - Route: Take 1 tablet by mouth Every 6 (Six) Hours As Needed (pain). - Oral    Reason for Discontinue: Patient Discharge    sodium chloride 0.9 % infusion (Discontinued) 125 mL/hr Continuous 11/18/2017 11/24/2017    Sig - Route: Infuse 125 mL/hr into a venous catheter Continuous. - Intravenous    Reason for Discontinue: Patient Discharge    Cosign for Ordering: Accepted by Ramana Bass MD on 11/18/2017 11:32 AM    sodium chloride 0.9 % infusion (Discontinued) 50 mL/hr Continuous 11/18/2017 11/24/2017    Sig - Route: Infuse 50 mL/hr into a venous catheter Continuous. - Intravenous    Reason for Discontinue: Patient Discharge            Lab Results (last 24 hours)     Procedure Component Value Units Date/Time    Blood Culture - Blood, [[id number]]  (Normal) Collected:  11/18/17 1910    Specimen:  Blood from Arm, Left Updated:  11/23/17 1946     Blood Culture No growth at 5 days    C-reactive Protein [[id number]]  (Abnormal) Collected:  11/24/17 0051    Specimen:  Blood Updated:  11/24/17 0309     C-Reactive Protein 1.56 (H) mg/dL     Basic Metabolic Panel [[id number]]  (Normal) Collected:  11/24/17 0051    Specimen:  Blood Updated:  11/24/17 0309     Glucose 90 mg/dL      BUN 15 mg/dL      Creatinine 0.78 mg/dL      Sodium 142 mmol/L      Potassium 4.6 mmol/L      Chloride 111 mmol/L      CO2 25.0 mmol/L      Calcium 8.3 mg/dL       eGFR Non African Amer 75 mL/min/1.73      BUN/Creatinine Ratio 19.2     Anion Gap 6.0 mmol/L     Narrative:       GFR Normal >60  Chronic Kidney Disease <60  Kidney Failure <15    Osmolality, Calculated [[id number]]  (Normal) Collected:  11/24/17 0051    Specimen:  Blood Updated:  11/24/17 0309     Osmolality Calc 283.5 mOsm/kg     CBC & Differential [[id number]] Collected:  11/24/17 0051    Specimen:  Blood Updated:  11/24/17 0321    Narrative:       The following orders were created for panel order CBC & Differential.  Procedure                               Abnormality         Status                     ---------                               -----------         ------                     CBC Auto Differential[[id number]]        Abnormal            Final result                 Please view results for these tests on the individual orders.    CBC Auto Differential [[id number]]  (Abnormal) Collected:  11/24/17 0051    Specimen:  Blood Updated:  11/24/17 0321     WBC 5.71 10*3/mm3      RBC 3.39 (L) 10*6/mm3      Hemoglobin 9.9 (L) g/dL      Hematocrit 32.1 (L) %      MCV 94.7 (H) fL      MCH 29.2 pg      MCHC 30.8 (L) g/dL      RDW 13.8 %      RDW-SD 45.0 fl      MPV 11.2 (H) fL      Platelets 276 10*3/mm3      Neutrophil % 52.5 %      Lymphocyte % 34.3 %      Monocyte % 9.1 %      Eosinophil % 3.3 %      Basophil % 0.4 %      Immature Grans % 0.4 %      Neutrophils, Absolute 3.00 10*3/mm3      Lymphocytes, Absolute 1.96 10*3/mm3      Monocytes, Absolute 0.52 10*3/mm3      Eosinophils, Absolute 0.19 10*3/mm3      Basophils, Absolute 0.02 10*3/mm3      Immature Grans, Absolute 0.02 10*3/mm3         Imaging Results (last 24 hours)     ** No results found for the last 24 hours. **        Orders (last 24 hrs)     Start     Ordered    11/24/17 0651  Discharge readmit patient  Once      11/24/17 0650    11/24/17 0600  CBC & Differential  Morning Draw      11/23/17 0656    11/24/17 0600  C-reactive Protein  Morning Draw       11/23/17 0656    11/24/17 0600  Basic Metabolic Panel  Morning Draw      11/23/17 0656    11/24/17 0600  CBC Auto Differential  PROCEDURE ONCE      11/24/17 0000    11/24/17 0310  Osmolality, Calculated  Once      11/24/17 0309     Physician Progress Notes (last 24 hours) (Notes from 11/23/2017  9:48 AM through 11/24/2017  9:48 AM)         Progress Notes  Date of Service: 11/23/2017 6:51 AM  CAROLYN See   Medicine   Cosigned by: Deshaun Ramirez MD at 11/23/2017  8:22 AM   Attestation signed by Deshaun Ramirez MD at 11/23/2017 8:22 AM   I have reviewed the documentation above and agree.         Expand All Collapse All   Hide copied text  Hover for attribution information  106                                                                                                          Gilda Galdamez         LOS: 5 days   Patient Care Team:  Sukhdev Ramirez MD as PCP - General (Family Medicine)           Subjective         Interval History:      Patient Complaints: No complaints this morning resting comfortably  Patient Denies:    History taken from: patient     Review of Systems:                         All systems were reviewed and negative         Objective         Vital Signs  Vital Signs (last 72 hrs)        11/19 0700  -  11/20 0659 11/20 0700  -  11/21 0659 11/21 0700  -  11/22 0659 11/22 0700  -  11/23 0651    Most Recent     Temp (°F) 97.8 -  97.9     98.2 -  98.6     97.6 -  98.2     97.8 -  97.9       97.9 (36.6)     Heart Rate 67 -  79     64 -  80     70 -  80     65 -  72       65     Resp 16 -  18         16     18 -  20     18 -  20       20     /57 -  (!) 192/70     148/52 -  156/59     138/60 -  178/50     149/50 -  160/57         SpO2 (%)     97     94 -  97     95 -  97     93 -  94       94            Intake & Output (last 3 days)        11/20 0701 - 11/21 0700 11/21 0701 - 11/22 0700 11/22 0701 - 11/23 0700     P.O. 1900 1200 1200     I.V. (mL/kg) 975 (13.1)   1000 (13.4)     Total  Intake(mL/kg) 2875 (38.5) 1200 (16.1) 2200 (29.5)     Net +2875 +1200 +2200                 Unmeasured Urine Occurrence 8 x 5 x 8 x     Unmeasured Stool Occurrence     0 x            Physical Exam:      General Appearance:    Alert, cooperative, in no acute distress   Head:    Normocephalic, without obvious abnormality, atraumatic   Eyes:            Lids and lashes normal, conjunctivae and sclerae normal, no   icterus, no pallor, corneas clear, PERRLA   Ears:    Ears appear intact with no abnormalities noted   Throat:   No oral lesions, no thrush, oral mucosa moist   Neck:   No adenopathy, supple, trachea midline, no thyromegaly, no     carotid bruit, no JVD   Back:     No kyphosis present, no scoliosis present, no skin lesions,       erythema or scars, no tenderness to percussion or                   palpation,   range of motion normal   Lungs:     Clear to auscultation,respirations regular, even and                   unlabored    Heart:    Regular rhythm and normal rate, normal S1 and S2, no            murmur, no gallop, no rub, no click   Breast Exam:    Deferred   Abdomen:     Normal bowel sounds, no masses, no organomegaly, soft        non-tender, non-distended, no guarding, no rebound                 tenderness   Genitalia:    Deferred   Extremities:   Moves all extremities well, no edema, no cyanosis, no              Redness right leg partially immobilized limited range of motion secondary to surgery    Pulses:   Pulses palpable and equal bilaterally   Skin:   No bleeding, bruising or rash   Lymph nodes:   No palpable adenopathy   Neurologic:   Cranial nerves 2 - 12 grossly intact, sensation intact, DTR        present and equal bilaterally      Lab Results (last 24 hours)     Procedure Component Value Units Date/Time            Blood Culture - Blood, [[id number]]  (Normal) Collected:  11/18/17 1910     Specimen:  Blood from Arm, Left Updated:  11/22/17 1946       Blood Culture No growth at 4 days              Imaging Results (last 24 hours)      ** No results found for the last 24 hours. **          Hospital Medications (active)        Dose Frequency Start End     acetaminophen (TYLENOL) tablet 325 mg 325 mg Every 4 Hours PRN 11/18/2017       Sig - Route: Take 1 tablet by mouth Every 4 (Four) Hours As Needed for Fever (>101). - Oral     acetaminophen (TYLENOL) tablet 650 mg 650 mg Every 4 Hours PRN 11/18/2017       Sig - Route: Take 2 tablets by mouth Every 4 (Four) Hours As Needed for Mild Pain . - Oral     enoxaparin (LOVENOX) syringe 40 mg 40 mg Daily 11/19/2017       Sig - Route: Inject 0.4 mL under the skin Daily. - Subcutaneous     HYDROmorphone (DILAUDID) injection 0.5 mg 0.5 mg Every 3 Hours PRN 11/18/2017 11/28/2017     Sig - Route: Infuse 0.5 mL into a venous catheter Every 3 (Three) Hours As Needed for Moderate Pain  or Severe Pain . - Intravenous     lactated ringers infusion 125 mL/hr Continuous 11/18/2017       Sig - Route: Infuse 125 mL/hr into a venous catheter Continuous. - Intravenous     levothyroxine (SYNTHROID, LEVOTHROID) tablet 25 mcg 25 mcg Daily 11/19/2017       Sig - Route: Take 1 tablet by mouth Daily. - Oral     lisinopril (PRINIVIL,ZESTRIL) tablet 20 mg 20 mg Daily 11/19/2017       Sig - Route: Take 2 tablets by mouth Daily. - Oral     naproxen (NAPROSYN) tablet 250 mg 250 mg Daily 11/19/2017       Sig - Route: Take 1 tablet by mouth Daily. - Oral     ondansetron (ZOFRAN) injection 4 mg 4 mg Every 8 Hours PRN 11/21/2017       Sig - Route: Infuse 2 mL into a venous catheter Every 8 (Eight) Hours As Needed for Nausea or Vomiting. - Intravenous     oxyCODONE-acetaminophen (PERCOCET) 5-325 MG per tablet 1 tablet 1 tablet Every 6 Hours PRN 11/19/2017       Sig - Route: Take 1 tablet by mouth Every 6 (Six) Hours As Needed (pain). - Oral     sodium chloride 0.9 % infusion 125 mL/hr Continuous 11/18/2017       Sig - Route: Infuse 125 mL/hr into a venous catheter Continuous. - Intravenous     Cosign  for Ordering: Accepted by Ramana Bass MD on 11/18/2017 11:32 AM     sodium chloride 0.9 % infusion 50 mL/hr Continuous 11/18/2017       Sig - Route: Infuse 50 mL/hr into a venous catheter Continuous. - Intravenous             Results Review:                          I reviewed the patient's new clinical results.     Medications Reviewed        Assessment/Plan       1.  Right femur fracture postop day 6 ORIF doing well patient will be moved to rehabilitation on Friday for  note  2.  Essential hypertension patient continues with good control  3.  Hypothyroidism  Principal Problem:    Closed displaced transverse fracture of shaft of right femur  Active Problems:    Femur fracture, right                    CAROLYN Rushing  11/23/17  6:51 AM                    Discharge Summary      CAROLYN See at 11/24/2017  6:54 AM               Date of Discharge:  11/24/2017    Discharge Diagnosis: Right femur fracture(postop day 7)                                          ORIF of the right femur                                            Essential hypertension                                             Hypothyroidism      Presenting Problem/History of Present Illness  Femur fracture, right [S72.91XA]       Hospital Course  Patient is a 62 y.o. female presented with right femur fracture on the 19th patient underwent ORIF of the right femur.  Surgery without complications by orthopedics Dr. Cifuentes.  Patient is been followed postoperatively from the last 7 days been monitored by her medical problems hypertension hypothyroidism is been followed by physical therapy and had nursing care.  Her course was complicated by elevated blood pressure is been monitored postoperatively for any signs of infection she's been followed by a rehabilitative services as a bed is become available the patient will now be transferred to acute rehabilitation for continued treatment and therapy.  Patient is stable at this point  she's afebrile her vital signs are stable and we'll monitor closely for her blood pressure in rehabilitative services patient will be transferred there this morning.     Procedures Performed  Procedure(s):  FEMUR OPEN REDUCTION INTERNAL FIXATION       Consults:   Consults     Date and Time Order Name Status Description    11/18/2017 1108 Ortho (on-call MD unless specified)      11/18/2017 1107 Family Practice (T James/[facility]/[place])            Pertinent Test Results   Lab Results (last 72 hours)     Procedure Component Value Units Date/Time    Blood Culture - Blood, [[id number]]  (Normal) Collected:  11/18/17 1910    Specimen:  Blood from Arm, Left Updated:  11/23/17 1946     Blood Culture No growth at 5 days    C-reactive Protein [[id number]]  (Abnormal) Collected:  11/24/17 0051    Specimen:  Blood Updated:  11/24/17 0309     C-Reactive Protein 1.56 (H) mg/dL     Basic Metabolic Panel [[id number]]  (Normal) Collected:  11/24/17 0051    Specimen:  Blood Updated:  11/24/17 0309     Glucose 90 mg/dL      BUN 15 mg/dL      Creatinine 0.78 mg/dL      Sodium 142 mmol/L      Potassium 4.6 mmol/L      Chloride 111 mmol/L      CO2 25.0 mmol/L      Calcium 8.3 mg/dL      eGFR Non African Amer 75 mL/min/1.73      BUN/Creatinine Ratio 19.2     Anion Gap 6.0 mmol/L     Narrative:       GFR Normal >60  Chronic Kidney Disease <60  Kidney Failure <15    Osmolality, Calculated [[id number]]  (Normal) Collected:  11/24/17 0051    Specimen:  Blood Updated:  11/24/17 0309     Osmolality Calc 283.5 mOsm/kg     CBC & Differential [[id number]] Collected:  11/24/17 0051    Specimen:  Blood Updated:  11/24/17 0321    Narrative:       The following orders were created for panel order CBC & Differential.  Procedure                               Abnormality         Status                     ---------                               -----------         ------                     CBC Auto Differential[[id number]]        Abnormal            Final  result                 Please view results for these tests on the individual orders.    CBC Auto Differential [[id number]]  (Abnormal) Collected:  11/24/17 0051    Specimen:  Blood Updated:  11/24/17 0321     WBC 5.71 10*3/mm3      RBC 3.39 (L) 10*6/mm3      Hemoglobin 9.9 (L) g/dL      Hematocrit 32.1 (L) %      MCV 94.7 (H) fL      MCH 29.2 pg      MCHC 30.8 (L) g/dL      RDW 13.8 %      RDW-SD 45.0 fl      MPV 11.2 (H) fL      Platelets 276 10*3/mm3      Neutrophil % 52.5 %      Lymphocyte % 34.3 %      Monocyte % 9.1 %      Eosinophil % 3.3 %      Basophil % 0.4 %      Immature Grans % 0.4 %      Neutrophils, Absolute 3.00 10*3/mm3      Lymphocytes, Absolute 1.96 10*3/mm3      Monocytes, Absolute 0.52 10*3/mm3      Eosinophils, Absolute 0.19 10*3/mm3      Basophils, Absolute 0.02 10*3/mm3      Immature Grans, Absolute 0.02 10*3/mm3         Imaging Results (last 72 hours)     ** No results found for the last 72 hours. **          Condition on Discharge:  Stable    Vital Signs  Temp:  [98.1 °F (36.7 °C)-98.9 °F (37.2 °C)] 98.2 °F (36.8 °C)  Heart Rate:  [63-67] 64  Resp:  [16-20] 16  BP: (130-172)/(42-68) 146/62    Physical Exam:     General Appearance:    Alert, cooperative, in no acute distress   Head:    Normocephalic, without obvious abnormality, atraumatic   Eyes:            Lids and lashes normal, conjunctivae and sclerae normal, no   icterus, no pallor, corneas clear, PERRLA   Ears:    Ears appear intact with no abnormalities noted   Throat:   No oral lesions, no thrush, oral mucosa moist   Neck:   No adenopathy, supple, trachea midline, no thyromegaly, no     carotid bruit, no JVD   Back:     No kyphosis present, no scoliosis present, no skin lesions,       erythema or scars, no tenderness to percussion or                   palpation,   range of motion normal   Lungs:     Clear to auscultation,respirations regular, even and                   unlabored    Heart:    Regular rhythm and normal rate, normal S1  and S2, no            murmur, no gallop, no rub, no click   Breast Exam:    Deferred   Abdomen:     Normal bowel sounds, no masses, no organomegaly, soft        non-tender, non-distended, no guarding, no rebound                 tenderness   Genitalia:    Deferred   Extremities:   Moves all extremities well, no edema, no cyanosis, no              redness   Pulses:   Pulses palpable and equal bilaterally   Skin:   No bleeding, bruising or rash   Lymph nodes:   No palpable adenopathy   Neurologic:   Cranial nerves 2 - 12 grossly intact, sensation intact, DTR        present and equal bilaterally       Discharge Disposition  Rehab Facility or Unit ([facility])    Discharge Medications   Gilda Galdamez   Home Medication Instructions DESHAUN:[id number]    Printed on:11/24/17 0654   Medication Information                      levothyroxine (SYNTHROID, LEVOTHROID) 25 MCG tablet  Take 25 mcg by mouth Daily.             lisinopril (PRINIVIL,ZESTRIL) 20 MG tablet  Take 20 mg by mouth Daily.             naproxen sodium (ALEVE) 220 MG tablet  Take 220 mg by mouth Daily.                 Discharge Diet:  regular as tolerated    Activity at Discharge:  as tolerated    Follow-up Appointments need follow-up appointment with Dr. Sukhdev Ramirez after being discharged from rehabilitation  No future appointments.      Test Results Pending at Discharge       CAROLYN Rushing  11/24/17  6:54 AM       Electronically signed by Deshaun Ramirez MD at 11/24/2017  7:39 AM        Discharge Order     Start     Ordered    11/24/17 0651  Discharge readmit patient  Once     Expected Discharge Date:  11/24/17    Discharge Disposition:  Rehab Facility or Unit ([facility])        11/24/17 0650

## 2017-11-25 LAB
ALBUMIN SERPL-MCNC: 3.5 G/DL (ref 3.4–4.8)
ALBUMIN/GLOB SERPL: 1.7 G/DL (ref 1.5–2.5)
ALP SERPL-CCNC: 65 U/L (ref 35–104)
ALT SERPL W P-5'-P-CCNC: 26 U/L (ref 10–36)
ANION GAP SERPL CALCULATED.3IONS-SCNC: 5 MMOL/L (ref 3.6–11.2)
AST SERPL-CCNC: 44 U/L (ref 10–30)
BASOPHILS # BLD AUTO: 0.02 10*3/MM3 (ref 0–0.3)
BASOPHILS NFR BLD AUTO: 0.3 % (ref 0–2)
BILIRUB SERPL-MCNC: 0.4 MG/DL (ref 0.2–1.8)
BUN BLD-MCNC: 12 MG/DL (ref 7–21)
BUN/CREAT SERPL: 17.9 (ref 7–25)
CALCIUM SPEC-SCNC: 8.7 MG/DL (ref 7.7–10)
CHLORIDE SERPL-SCNC: 111 MMOL/L (ref 99–112)
CO2 SERPL-SCNC: 26 MMOL/L (ref 24.3–31.9)
CREAT BLD-MCNC: 0.67 MG/DL (ref 0.43–1.29)
DEPRECATED RDW RBC AUTO: 45.3 FL (ref 37–54)
EOSINOPHIL # BLD AUTO: 0.21 10*3/MM3 (ref 0–0.7)
EOSINOPHIL NFR BLD AUTO: 3.5 % (ref 0–5)
ERYTHROCYTE [DISTWIDTH] IN BLOOD BY AUTOMATED COUNT: 14 % (ref 11.5–14.5)
GFR SERPL CREATININE-BSD FRML MDRD: 89 ML/MIN/1.73
GLOBULIN UR ELPH-MCNC: 2.1 GM/DL
GLUCOSE BLD-MCNC: 103 MG/DL (ref 70–110)
HCT VFR BLD AUTO: 32.3 % (ref 37–47)
HGB BLD-MCNC: 10 G/DL (ref 12–16)
IMM GRANULOCYTES # BLD: 0.02 10*3/MM3 (ref 0–0.03)
IMM GRANULOCYTES NFR BLD: 0.3 % (ref 0–0.5)
LYMPHOCYTES # BLD AUTO: 1.42 10*3/MM3 (ref 1–3)
LYMPHOCYTES NFR BLD AUTO: 23.6 % (ref 21–51)
MCH RBC QN AUTO: 28.9 PG (ref 27–33)
MCHC RBC AUTO-ENTMCNC: 31 G/DL (ref 33–37)
MCV RBC AUTO: 93.4 FL (ref 80–94)
MONOCYTES # BLD AUTO: 0.45 10*3/MM3 (ref 0.1–0.9)
MONOCYTES NFR BLD AUTO: 7.5 % (ref 0–10)
NEUTROPHILS # BLD AUTO: 3.9 10*3/MM3 (ref 1.4–6.5)
NEUTROPHILS NFR BLD AUTO: 64.8 % (ref 30–70)
OSMOLALITY SERPL CALC.SUM OF ELEC: 283.1 MOSM/KG (ref 273–305)
PLATELET # BLD AUTO: 286 10*3/MM3 (ref 130–400)
PMV BLD AUTO: 10.7 FL (ref 6–10)
POTASSIUM BLD-SCNC: 4.1 MMOL/L (ref 3.5–5.3)
PROT SERPL-MCNC: 5.6 G/DL (ref 6–8)
RBC # BLD AUTO: 3.46 10*6/MM3 (ref 4.2–5.4)
SODIUM BLD-SCNC: 142 MMOL/L (ref 135–153)
WBC NRBC COR # BLD: 6.02 10*3/MM3 (ref 4.5–12.5)

## 2017-11-25 PROCEDURE — 97110 THERAPEUTIC EXERCISES: CPT

## 2017-11-25 PROCEDURE — 97116 GAIT TRAINING THERAPY: CPT

## 2017-11-25 PROCEDURE — 80053 COMPREHEN METABOLIC PANEL: CPT | Performed by: FAMILY MEDICINE

## 2017-11-25 PROCEDURE — 97530 THERAPEUTIC ACTIVITIES: CPT

## 2017-11-25 PROCEDURE — 97163 PT EVAL HIGH COMPLEX 45 MIN: CPT

## 2017-11-25 PROCEDURE — 85025 COMPLETE CBC W/AUTO DIFF WBC: CPT | Performed by: FAMILY MEDICINE

## 2017-11-25 PROCEDURE — 25010000002 ENOXAPARIN PER 10 MG: Performed by: FAMILY MEDICINE

## 2017-11-25 PROCEDURE — 97167 OT EVAL HIGH COMPLEX 60 MIN: CPT

## 2017-11-25 RX ADMIN — ENOXAPARIN SODIUM 40 MG: 40 INJECTION SUBCUTANEOUS at 09:19

## 2017-11-25 RX ADMIN — OXYCODONE HYDROCHLORIDE AND ACETAMINOPHEN 1 TABLET: 5; 325 TABLET ORAL at 12:24

## 2017-11-25 RX ADMIN — LISINOPRIL 20 MG: 10 TABLET ORAL at 09:19

## 2017-11-25 RX ADMIN — OXYCODONE HYDROCHLORIDE AND ACETAMINOPHEN 1 TABLET: 5; 325 TABLET ORAL at 05:42

## 2017-11-25 RX ADMIN — NAPROXEN 250 MG: 250 TABLET ORAL at 09:19

## 2017-11-25 RX ADMIN — LEVOTHYROXINE SODIUM 25 MCG: 25 TABLET ORAL at 09:19

## 2017-11-25 RX ADMIN — OXYCODONE HYDROCHLORIDE AND ACETAMINOPHEN 1 TABLET: 5; 325 TABLET ORAL at 18:21

## 2017-11-25 NOTE — PAYOR COMM NOTE
"[facility]   NIKI JARAMILLO  PHONE  [phone]  FAX  [phone]    PATIENT D/C 11/24/17    Chetna Hsu (62 y.o. Female)     Date of Birth Social Security Number Address Home Phone MRN    1955  [address] [place], [place] [id number] [phone] [id number]    [employer] Marital Status          Quaker        Admission Date Admission Type Admitting Provider Attending Provider Department, Room/Bed    11/18/17 Emergency Sukhdev Ramirez MD  [address], 3330/1P    Discharge Date Discharge Disposition Discharge Destination        11/24/2017 Rehab Facility or Unit ([facility])             Attending Provider: (none)    Allergies:  No Known Allergies    Isolation:  None   Infection:  None   Code Status:  Prior    Ht:  67\" (170.2 cm)   Wt:  164 lb 7 oz (74.6 kg)    Admission Cmt:  None   Principal Problem:  Closed displaced transverse fracture of shaft of right femur [S72.321A] More...                 Active Insurance as of 11/18/2017     Primary Coverage     Payor Plan Insurance Group Employer/Plan Group    WORKERS COMPENSATION MISC WORKERS COMPENSATION      Coverage Address Coverage Phone Number Effective From Effective To    [address] [address] [phone] 11/18/2017     [place] [address]       Subscriber Name Subscriber Birth Date Member ID       CHETNA HSU [id number]                 Emergency Contacts      (Rel.) Home Phone Work Phone Mobile Phone    George Hsu (Friend) [phone] -- --               Discharge Summary      CAROLYN See at 11/24/2017  6:54 AM               Date of Discharge:  11/24/2017    Discharge Diagnosis: Right femur fracture(postop day 7)                                          ORIF of the right femur                                            Essential hypertension                                             Hypothyroidism      Presenting Problem/History of Present Illness  Femur fracture, right " [S72.91XA]       Hospital Course  Patient is a 62 y.o. female presented with right femur fracture on the 19th patient underwent ORIF of the right femur.  Surgery without complications by orthopedics Dr. Cifuentes.  Patient is been followed postoperatively from the last 7 days been monitored by her medical problems hypertension hypothyroidism is been followed by physical therapy and had nursing care.  Her course was complicated by elevated blood pressure is been monitored postoperatively for any signs of infection she's been followed by a rehabilitative services as a bed is become available the patient will now be transferred to acute rehabilitation for continued treatment and therapy.  Patient is stable at this point she's afebrile her vital signs are stable and we'll monitor closely for her blood pressure in rehabilitative services patient will be transferred there this morning.     Procedures Performed  Procedure(s):  FEMUR OPEN REDUCTION INTERNAL FIXATION       Consults:   Consults     Date and Time Order Name Status Description    11/18/2017 1108 Ortho (on-call MD unless specified)      11/18/2017 1107 Family Practice (T James/JANET Ramirez/Justino)            Pertinent Test Results   Lab Results (last 72 hours)     Procedure Component Value Units Date/Time    Blood Culture - Blood, [[id number]]  (Normal) Collected:  11/18/17 1910    Specimen:  Blood from Arm, Left Updated:  11/23/17 1946     Blood Culture No growth at 5 days    C-reactive Protein [[id number]]  (Abnormal) Collected:  11/24/17 0051    Specimen:  Blood Updated:  11/24/17 0309     C-Reactive Protein 1.56 (H) mg/dL     Basic Metabolic Panel [[id number]]  (Normal) Collected:  11/24/17 0051    Specimen:  Blood Updated:  11/24/17 0309     Glucose 90 mg/dL      BUN 15 mg/dL      Creatinine 0.78 mg/dL      Sodium 142 mmol/L      Potassium 4.6 mmol/L      Chloride 111 mmol/L      CO2 25.0 mmol/L      Calcium 8.3 mg/dL      eGFR Non African Amer 75 mL/min/1.73       BUN/Creatinine Ratio 19.2     Anion Gap 6.0 mmol/L     Narrative:       GFR Normal >60  Chronic Kidney Disease <60  Kidney Failure <15    Osmolality, Calculated [[id number]]  (Normal) Collected:  11/24/17 0051    Specimen:  Blood Updated:  11/24/17 0309     Osmolality Calc 283.5 mOsm/kg     CBC & Differential [[id number]] Collected:  11/24/17 0051    Specimen:  Blood Updated:  11/24/17 0321    Narrative:       The following orders were created for panel order CBC & Differential.  Procedure                               Abnormality         Status                     ---------                               -----------         ------                     CBC Auto Differential[[id number]]        Abnormal            Final result                 Please view results for these tests on the individual orders.    CBC Auto Differential [[id number]]  (Abnormal) Collected:  11/24/17 0051    Specimen:  Blood Updated:  11/24/17 0321     WBC 5.71 10*3/mm3      RBC 3.39 (L) 10*6/mm3      Hemoglobin 9.9 (L) g/dL      Hematocrit 32.1 (L) %      MCV 94.7 (H) fL      MCH 29.2 pg      MCHC 30.8 (L) g/dL      RDW 13.8 %      RDW-SD 45.0 fl      MPV 11.2 (H) fL      Platelets 276 10*3/mm3      Neutrophil % 52.5 %      Lymphocyte % 34.3 %      Monocyte % 9.1 %      Eosinophil % 3.3 %      Basophil % 0.4 %      Immature Grans % 0.4 %      Neutrophils, Absolute 3.00 10*3/mm3      Lymphocytes, Absolute 1.96 10*3/mm3      Monocytes, Absolute 0.52 10*3/mm3      Eosinophils, Absolute 0.19 10*3/mm3      Basophils, Absolute 0.02 10*3/mm3      Immature Grans, Absolute 0.02 10*3/mm3         Imaging Results (last 72 hours)     ** No results found for the last 72 hours. **          Condition on Discharge:  Stable    Vital Signs  Temp:  [98.1 °F (36.7 °C)-98.9 °F (37.2 °C)] 98.2 °F (36.8 °C)  Heart Rate:  [63-67] 64  Resp:  [16-20] 16  BP: (130-172)/(42-68) 146/62    Physical Exam:     General Appearance:    Alert, cooperative, in no acute distress   Head:     Normocephalic, without obvious abnormality, atraumatic   Eyes:            Lids and lashes normal, conjunctivae and sclerae normal, no   icterus, no pallor, corneas clear, PERRLA   Ears:    Ears appear intact with no abnormalities noted   Throat:   No oral lesions, no thrush, oral mucosa moist   Neck:   No adenopathy, supple, trachea midline, no thyromegaly, no     carotid bruit, no JVD   Back:     No kyphosis present, no scoliosis present, no skin lesions,       erythema or scars, no tenderness to percussion or                   palpation,   range of motion normal   Lungs:     Clear to auscultation,respirations regular, even and                   unlabored    Heart:    Regular rhythm and normal rate, normal S1 and S2, no            murmur, no gallop, no rub, no click   Breast Exam:    Deferred   Abdomen:     Normal bowel sounds, no masses, no organomegaly, soft        non-tender, non-distended, no guarding, no rebound                 tenderness   Genitalia:    Deferred   Extremities:   Moves all extremities well, no edema, no cyanosis, no              redness   Pulses:   Pulses palpable and equal bilaterally   Skin:   No bleeding, bruising or rash   Lymph nodes:   No palpable adenopathy   Neurologic:   Cranial nerves 2 - 12 grossly intact, sensation intact, DTR        present and equal bilaterally       Discharge Disposition  Rehab Facility or Unit ([facility])    Discharge Medications   Gilda Galdamez   Home Medication Instructions DESHAUN:[id number]    Printed on:11/24/17 0654   Medication Information                      levothyroxine (SYNTHROID, LEVOTHROID) 25 MCG tablet  Take 25 mcg by mouth Daily.             lisinopril (PRINIVIL,ZESTRIL) 20 MG tablet  Take 20 mg by mouth Daily.             naproxen sodium (ALEVE) 220 MG tablet  Take 220 mg by mouth Daily.                 Discharge Diet:  regular as tolerated    Activity at Discharge:  as tolerated    Follow-up Appointments need follow-up  appointment with Dr. Sukhdev Ramirez after being discharged from rehabilitation  No future appointments.      Test Results Pending at Discharge       CAROLYN Rushing  11/24/17  6:54 AM       Electronically signed by Deshaun Ramirez MD at 11/24/2017  7:39 AM

## 2017-11-26 PROCEDURE — 94799 UNLISTED PULMONARY SVC/PX: CPT

## 2017-11-26 PROCEDURE — 25010000002 ENOXAPARIN PER 10 MG: Performed by: FAMILY MEDICINE

## 2017-11-26 RX ADMIN — LEVOTHYROXINE SODIUM 25 MCG: 25 TABLET ORAL at 09:57

## 2017-11-26 RX ADMIN — ENOXAPARIN SODIUM 40 MG: 40 INJECTION SUBCUTANEOUS at 09:57

## 2017-11-26 RX ADMIN — LISINOPRIL 20 MG: 10 TABLET ORAL at 09:56

## 2017-11-26 RX ADMIN — OXYCODONE HYDROCHLORIDE AND ACETAMINOPHEN 1 TABLET: 5; 325 TABLET ORAL at 06:06

## 2017-11-26 RX ADMIN — NAPROXEN 250 MG: 250 TABLET ORAL at 09:56

## 2017-11-26 RX ADMIN — OXYCODONE HYDROCHLORIDE AND ACETAMINOPHEN 1 TABLET: 5; 325 TABLET ORAL at 21:28

## 2017-11-26 RX ADMIN — OXYCODONE HYDROCHLORIDE AND ACETAMINOPHEN 1 TABLET: 5; 325 TABLET ORAL at 14:56

## 2017-11-27 PROCEDURE — 97116 GAIT TRAINING THERAPY: CPT

## 2017-11-27 PROCEDURE — 25010000002 ENOXAPARIN PER 10 MG: Performed by: FAMILY MEDICINE

## 2017-11-27 PROCEDURE — 97535 SELF CARE MNGMENT TRAINING: CPT

## 2017-11-27 PROCEDURE — 97110 THERAPEUTIC EXERCISES: CPT

## 2017-11-27 PROCEDURE — 97530 THERAPEUTIC ACTIVITIES: CPT

## 2017-11-27 RX ORDER — HYDROCHLOROTHIAZIDE 12.5 MG/1
12.5 CAPSULE, GELATIN COATED ORAL
Status: DISCONTINUED | OUTPATIENT
Start: 2017-11-27 | End: 2017-11-30 | Stop reason: HOSPADM

## 2017-11-27 RX ADMIN — OXYCODONE HYDROCHLORIDE AND ACETAMINOPHEN 1 TABLET: 5; 325 TABLET ORAL at 01:46

## 2017-11-27 RX ADMIN — OXYCODONE HYDROCHLORIDE AND ACETAMINOPHEN 1 TABLET: 5; 325 TABLET ORAL at 21:42

## 2017-11-27 RX ADMIN — ENOXAPARIN SODIUM 40 MG: 40 INJECTION SUBCUTANEOUS at 08:46

## 2017-11-27 RX ADMIN — OXYCODONE HYDROCHLORIDE AND ACETAMINOPHEN 1 TABLET: 5; 325 TABLET ORAL at 14:39

## 2017-11-27 RX ADMIN — OXYCODONE HYDROCHLORIDE AND ACETAMINOPHEN 1 TABLET: 5; 325 TABLET ORAL at 06:21

## 2017-11-27 RX ADMIN — LISINOPRIL 20 MG: 10 TABLET ORAL at 08:45

## 2017-11-27 RX ADMIN — LEVOTHYROXINE SODIUM 25 MCG: 25 TABLET ORAL at 08:46

## 2017-11-27 RX ADMIN — NAPROXEN 250 MG: 250 TABLET ORAL at 08:46

## 2017-11-27 RX ADMIN — HYDROCHLOROTHIAZIDE 12.5 MG: 12.5 CAPSULE, GELATIN COATED ORAL at 17:16

## 2017-11-28 PROCEDURE — 25010000002 ENOXAPARIN PER 10 MG: Performed by: FAMILY MEDICINE

## 2017-11-28 PROCEDURE — 97535 SELF CARE MNGMENT TRAINING: CPT

## 2017-11-28 PROCEDURE — 94799 UNLISTED PULMONARY SVC/PX: CPT

## 2017-11-28 PROCEDURE — 97110 THERAPEUTIC EXERCISES: CPT

## 2017-11-28 PROCEDURE — 97530 THERAPEUTIC ACTIVITIES: CPT

## 2017-11-28 PROCEDURE — 97116 GAIT TRAINING THERAPY: CPT

## 2017-11-28 RX ORDER — CLONIDINE HYDROCHLORIDE 0.1 MG/1
0.1 TABLET ORAL EVERY 6 HOURS PRN
Status: DISCONTINUED | OUTPATIENT
Start: 2017-11-28 | End: 2017-11-30 | Stop reason: HOSPADM

## 2017-11-28 RX ADMIN — OXYCODONE HYDROCHLORIDE AND ACETAMINOPHEN 1 TABLET: 5; 325 TABLET ORAL at 21:13

## 2017-11-28 RX ADMIN — CLONIDINE HYDROCHLORIDE 0.1 MG: 0.1 TABLET ORAL at 17:54

## 2017-11-28 RX ADMIN — LISINOPRIL 20 MG: 10 TABLET ORAL at 08:28

## 2017-11-28 RX ADMIN — NAPROXEN 250 MG: 250 TABLET ORAL at 08:29

## 2017-11-28 RX ADMIN — HYDROCHLOROTHIAZIDE 12.5 MG: 12.5 CAPSULE, GELATIN COATED ORAL at 08:30

## 2017-11-28 RX ADMIN — ENOXAPARIN SODIUM 40 MG: 40 INJECTION SUBCUTANEOUS at 08:28

## 2017-11-28 RX ADMIN — OXYCODONE HYDROCHLORIDE AND ACETAMINOPHEN 1 TABLET: 5; 325 TABLET ORAL at 05:47

## 2017-11-28 RX ADMIN — LEVOTHYROXINE SODIUM 25 MCG: 25 TABLET ORAL at 08:30

## 2017-11-28 RX ADMIN — OXYCODONE HYDROCHLORIDE AND ACETAMINOPHEN 1 TABLET: 5; 325 TABLET ORAL at 12:44

## 2017-11-29 PROCEDURE — 97110 THERAPEUTIC EXERCISES: CPT

## 2017-11-29 PROCEDURE — 97530 THERAPEUTIC ACTIVITIES: CPT

## 2017-11-29 PROCEDURE — 97535 SELF CARE MNGMENT TRAINING: CPT

## 2017-11-29 PROCEDURE — 25010000002 ENOXAPARIN PER 10 MG: Performed by: FAMILY MEDICINE

## 2017-11-29 PROCEDURE — 97116 GAIT TRAINING THERAPY: CPT

## 2017-11-29 RX ADMIN — OXYCODONE HYDROCHLORIDE AND ACETAMINOPHEN 1 TABLET: 5; 325 TABLET ORAL at 02:36

## 2017-11-29 RX ADMIN — ENOXAPARIN SODIUM 40 MG: 40 INJECTION SUBCUTANEOUS at 08:48

## 2017-11-29 RX ADMIN — LEVOTHYROXINE SODIUM 25 MCG: 25 TABLET ORAL at 08:48

## 2017-11-29 RX ADMIN — OXYCODONE HYDROCHLORIDE AND ACETAMINOPHEN 1 TABLET: 5; 325 TABLET ORAL at 21:16

## 2017-11-29 RX ADMIN — OXYCODONE HYDROCHLORIDE AND ACETAMINOPHEN 1 TABLET: 5; 325 TABLET ORAL at 15:28

## 2017-11-29 RX ADMIN — CLONIDINE HYDROCHLORIDE 0.1 MG: 0.1 TABLET ORAL at 07:27

## 2017-11-29 RX ADMIN — NAPROXEN 250 MG: 250 TABLET ORAL at 08:48

## 2017-11-29 RX ADMIN — LISINOPRIL 20 MG: 10 TABLET ORAL at 08:48

## 2017-11-29 RX ADMIN — HYDROCHLOROTHIAZIDE 12.5 MG: 12.5 CAPSULE, GELATIN COATED ORAL at 08:48

## 2017-11-30 VITALS
HEIGHT: 67 IN | WEIGHT: 164 LBS | DIASTOLIC BLOOD PRESSURE: 68 MMHG | BODY MASS INDEX: 25.74 KG/M2 | HEART RATE: 68 BPM | TEMPERATURE: 97.8 F | OXYGEN SATURATION: 96 % | SYSTOLIC BLOOD PRESSURE: 136 MMHG | RESPIRATION RATE: 18 BRPM

## 2017-11-30 PROCEDURE — 97535 SELF CARE MNGMENT TRAINING: CPT

## 2017-11-30 PROCEDURE — 25010000002 ENOXAPARIN PER 10 MG: Performed by: FAMILY MEDICINE

## 2017-11-30 PROCEDURE — 97530 THERAPEUTIC ACTIVITIES: CPT

## 2017-11-30 PROCEDURE — 97110 THERAPEUTIC EXERCISES: CPT

## 2017-11-30 RX ORDER — OXYCODONE HYDROCHLORIDE AND ACETAMINOPHEN 5; 325 MG/1; MG/1
1 TABLET ORAL EVERY 6 HOURS PRN
Qty: 30 TABLET | Refills: 0 | Status: SHIPPED | OUTPATIENT
Start: 2017-11-30

## 2017-11-30 RX ORDER — LISINOPRIL AND HYDROCHLOROTHIAZIDE 20; 12.5 MG/1; MG/1
1 TABLET ORAL DAILY
Qty: 30 TABLET | Refills: 0 | Status: SHIPPED | OUTPATIENT
Start: 2017-11-30 | End: 2017-11-30

## 2017-11-30 RX ORDER — LISINOPRIL AND HYDROCHLOROTHIAZIDE 20; 12.5 MG/1; MG/1
1 TABLET ORAL DAILY
Qty: 30 TABLET | Refills: 0 | Status: SHIPPED | OUTPATIENT
Start: 2017-11-30 | End: 2022-01-01

## 2017-11-30 RX ADMIN — LISINOPRIL 20 MG: 10 TABLET ORAL at 08:21

## 2017-11-30 RX ADMIN — OXYCODONE HYDROCHLORIDE AND ACETAMINOPHEN 1 TABLET: 5; 325 TABLET ORAL at 05:32

## 2017-11-30 RX ADMIN — LEVOTHYROXINE SODIUM 25 MCG: 25 TABLET ORAL at 08:21

## 2017-11-30 RX ADMIN — ENOXAPARIN SODIUM 40 MG: 40 INJECTION SUBCUTANEOUS at 08:21

## 2017-11-30 RX ADMIN — HYDROCHLOROTHIAZIDE 12.5 MG: 12.5 CAPSULE, GELATIN COATED ORAL at 08:21

## 2017-11-30 RX ADMIN — NAPROXEN 250 MG: 250 TABLET ORAL at 08:21

## 2017-12-08 NOTE — PROGRESS NOTES
PPS CMG Coordinator  Inpatient Rehabilitation Discharge    Mode of Locomotion: Walking.    Discharge Against Medical Advice:  No.  Discharge Information  Patient Discharged Alive:  Yes  Discharge Destination/Living Setting: Home with Home Health Services  Diagnosis for Interruption/Death:    Impairment Group: 08.2 Status Post Femur (shaft) Fracture    Comorbidities:    Complications:      MAYRA Bladder Accidents: 0  - Accidents.  Bladder Score = 6. Patient has not had an accident, but uses a  device/medication.  MAYRA Bowel Accident: 0  - Accidents.  Bowel Score = 6. Patient has no accidents, but uses a device/medications.    Signed by: Brielle Gonzalez, Supervisor

## 2018-06-01 ENCOUNTER — TRANSCRIBE ORDERS (OUTPATIENT)
Dept: CARDIOLOGY | Facility: HOSPITAL | Age: 63
End: 2018-06-01

## 2018-06-01 DIAGNOSIS — R60.0 LOWER EXTREMITY EDEMA: Primary | ICD-10-CM

## 2018-06-07 ENCOUNTER — HOSPITAL ENCOUNTER (OUTPATIENT)
Dept: CARDIOLOGY | Facility: HOSPITAL | Age: 63
Discharge: HOME | End: 2018-06-07
Admitting: FAMILY MEDICINE

## 2018-06-07 DIAGNOSIS — R60.0 LOWER EXTREMITY EDEMA: ICD-10-CM

## 2018-06-07 PROCEDURE — 93971 EXTREMITY STUDY: CPT | Performed by: RADIOLOGY

## 2018-06-07 PROCEDURE — 93971 EXTREMITY STUDY: CPT

## 2018-11-15 ENCOUNTER — HOSPITAL ENCOUNTER (OUTPATIENT)
Dept: MAMMOGRAPHY | Facility: HOSPITAL | Age: 63
Discharge: HOME | End: 2018-11-15
Admitting: FAMILY MEDICINE

## 2018-11-15 DIAGNOSIS — Z12.39 SCREENING BREAST EXAMINATION: ICD-10-CM

## 2018-11-15 PROCEDURE — 77067 SCR MAMMO BI INCL CAD: CPT

## 2018-11-15 PROCEDURE — 77063 BREAST TOMOSYNTHESIS BI: CPT | Performed by: RADIOLOGY

## 2018-11-15 PROCEDURE — 77063 BREAST TOMOSYNTHESIS BI: CPT

## 2018-11-15 PROCEDURE — 77067 SCR MAMMO BI INCL CAD: CPT | Performed by: RADIOLOGY

## 2018-12-05 ENCOUNTER — HOSPITAL ENCOUNTER (OUTPATIENT)
Dept: ULTRASOUND IMAGING | Facility: HOSPITAL | Age: 63
Discharge: HOME | End: 2018-12-05
Admitting: RADIOLOGY

## 2018-12-05 DIAGNOSIS — R92.8 ABNORMAL MAMMOGRAM: ICD-10-CM

## 2018-12-05 PROCEDURE — 76642 ULTRASOUND BREAST LIMITED: CPT

## 2018-12-05 PROCEDURE — 76642 ULTRASOUND BREAST LIMITED: CPT | Performed by: RADIOLOGY

## 2022-01-01 ENCOUNTER — HOSPITAL ENCOUNTER (EMERGENCY)
Facility: HOSPITAL | Age: 67
Discharge: HOME | End: 2022-06-07
Attending: STUDENT IN AN ORGANIZED HEALTH CARE EDUCATION/TRAINING PROGRAM | Admitting: STUDENT IN AN ORGANIZED HEALTH CARE EDUCATION/TRAINING PROGRAM

## 2022-01-01 ENCOUNTER — APPOINTMENT (OUTPATIENT)
Dept: CT IMAGING | Facility: HOSPITAL | Age: 67
End: 2022-01-01

## 2022-01-01 ENCOUNTER — HOSPITAL ENCOUNTER (EMERGENCY)
Facility: HOSPITAL | Age: 67
Discharge: HOME | End: 2022-08-20
Attending: EMERGENCY MEDICINE | Admitting: EMERGENCY MEDICINE

## 2022-01-01 ENCOUNTER — APPOINTMENT (OUTPATIENT)
Dept: GENERAL RADIOLOGY | Facility: HOSPITAL | Age: 67
End: 2022-01-01

## 2022-01-01 ENCOUNTER — HOSPITAL ENCOUNTER (EMERGENCY)
Facility: HOSPITAL | Age: 67
Discharge: HOME | End: 2022-08-16
Attending: STUDENT IN AN ORGANIZED HEALTH CARE EDUCATION/TRAINING PROGRAM | Admitting: STUDENT IN AN ORGANIZED HEALTH CARE EDUCATION/TRAINING PROGRAM

## 2022-01-01 ENCOUNTER — OFFICE VISIT (OUTPATIENT)
Dept: GASTROENTEROLOGY | Facility: CLINIC | Age: 67
End: 2022-01-01

## 2022-01-01 ENCOUNTER — PATIENT OUTREACH (OUTPATIENT)
Dept: CASE MANAGEMENT | Facility: OTHER | Age: 67
End: 2022-01-01

## 2022-01-01 VITALS
RESPIRATION RATE: 18 BRPM | TEMPERATURE: 96.8 F | HEIGHT: 67 IN | HEART RATE: 97 BPM | BODY MASS INDEX: 26.84 KG/M2 | WEIGHT: 171 LBS | DIASTOLIC BLOOD PRESSURE: 83 MMHG | OXYGEN SATURATION: 94 % | SYSTOLIC BLOOD PRESSURE: 183 MMHG

## 2022-01-01 VITALS
RESPIRATION RATE: 18 BRPM | OXYGEN SATURATION: 99 % | TEMPERATURE: 97 F | DIASTOLIC BLOOD PRESSURE: 61 MMHG | BODY MASS INDEX: 20.88 KG/M2 | SYSTOLIC BLOOD PRESSURE: 127 MMHG | HEART RATE: 78 BPM | WEIGHT: 133 LBS | HEIGHT: 67 IN

## 2022-01-01 VITALS
DIASTOLIC BLOOD PRESSURE: 86 MMHG | OXYGEN SATURATION: 98 % | SYSTOLIC BLOOD PRESSURE: 108 MMHG | WEIGHT: 133 LBS | TEMPERATURE: 98.5 F | HEIGHT: 67 IN | RESPIRATION RATE: 16 BRPM | HEART RATE: 80 BPM | BODY MASS INDEX: 20.88 KG/M2

## 2022-01-01 VITALS — HEIGHT: 67 IN | WEIGHT: 133 LBS | BODY MASS INDEX: 20.88 KG/M2

## 2022-01-01 DIAGNOSIS — S82.101A CLOSED FRACTURE OF PROXIMAL END OF RIGHT TIBIA, UNSPECIFIED FRACTURE MORPHOLOGY, INITIAL ENCOUNTER: Primary | ICD-10-CM

## 2022-01-01 DIAGNOSIS — K86.1 CHRONIC PANCREATITIS, UNSPECIFIED PANCREATITIS TYPE: ICD-10-CM

## 2022-01-01 DIAGNOSIS — R11.2 NAUSEA AND VOMITING, UNSPECIFIED VOMITING TYPE: Primary | ICD-10-CM

## 2022-01-01 DIAGNOSIS — R11.0 NAUSEA: Primary | ICD-10-CM

## 2022-01-01 DIAGNOSIS — R63.4 WEIGHT LOSS, ABNORMAL: ICD-10-CM

## 2022-01-01 DIAGNOSIS — R11.2 NAUSEA AND VOMITING, UNSPECIFIED VOMITING TYPE: ICD-10-CM

## 2022-01-01 DIAGNOSIS — R11.10 VOMITING, UNSPECIFIED VOMITING TYPE, UNSPECIFIED WHETHER NAUSEA PRESENT: ICD-10-CM

## 2022-01-01 DIAGNOSIS — J18.9 PNEUMONIA OF RIGHT LOWER LOBE DUE TO INFECTIOUS ORGANISM: Primary | ICD-10-CM

## 2022-01-01 LAB
A-A DO2: 37.6 MMHG (ref 0–300)
ALBUMIN SERPL-MCNC: 4.25 G/DL (ref 3.5–5.2)
ALBUMIN SERPL-MCNC: 4.58 G/DL (ref 3.5–5.2)
ALBUMIN/GLOB SERPL: 1.5 G/DL
ALBUMIN/GLOB SERPL: 1.6 G/DL
ALP SERPL-CCNC: 84 U/L (ref 39–117)
ALP SERPL-CCNC: 98 U/L (ref 39–117)
ALT SERPL W P-5'-P-CCNC: 7 U/L (ref 1–33)
ALT SERPL W P-5'-P-CCNC: 7 U/L (ref 1–33)
AMPHET+METHAMPHET UR QL: NEGATIVE
AMPHETAMINES UR QL: NEGATIVE
ANION GAP SERPL CALCULATED.3IONS-SCNC: 20.2 MMOL/L (ref 5–15)
ANION GAP SERPL CALCULATED.3IONS-SCNC: 20.9 MMOL/L (ref 5–15)
ARTERIAL PATENCY WRIST A: ABNORMAL
AST SERPL-CCNC: 21 U/L (ref 1–32)
AST SERPL-CCNC: 22 U/L (ref 1–32)
ATMOSPHERIC PRESS: 727 MMHG
BACTERIA SPEC AEROBE CULT: NORMAL
BACTERIA SPEC AEROBE CULT: NORMAL
BACTERIA UR QL AUTO: ABNORMAL /HPF
BARBITURATES UR QL SCN: NEGATIVE
BASE EXCESS BLDA CALC-SCNC: -3.3 MMOL/L (ref 0–2)
BASOPHILS # BLD AUTO: 0.01 10*3/MM3 (ref 0–0.2)
BASOPHILS # BLD AUTO: 0.02 10*3/MM3 (ref 0–0.2)
BASOPHILS NFR BLD AUTO: 0.1 % (ref 0–1.5)
BASOPHILS NFR BLD AUTO: 0.1 % (ref 0–1.5)
BDY SITE: ABNORMAL
BENZODIAZ UR QL SCN: NEGATIVE
BILIRUB SERPL-MCNC: 0.8 MG/DL (ref 0–1.2)
BILIRUB SERPL-MCNC: 0.9 MG/DL (ref 0–1.2)
BILIRUB UR QL STRIP: ABNORMAL
BODY TEMPERATURE: 0 C
BUN SERPL-MCNC: 36 MG/DL (ref 8–23)
BUN SERPL-MCNC: 45 MG/DL (ref 8–23)
BUN/CREAT SERPL: 35.6 (ref 7–25)
BUN/CREAT SERPL: 40.5 (ref 7–25)
BUPRENORPHINE SERPL-MCNC: NEGATIVE NG/ML
CALCIUM SPEC-SCNC: 9.8 MG/DL (ref 8.6–10.5)
CALCIUM SPEC-SCNC: 9.9 MG/DL (ref 8.6–10.5)
CANNABINOIDS SERPL QL: NEGATIVE
CHLORIDE SERPL-SCNC: 96 MMOL/L (ref 98–107)
CHLORIDE SERPL-SCNC: 98 MMOL/L (ref 98–107)
CK SERPL-CCNC: 56 U/L (ref 20–180)
CLARITY UR: ABNORMAL
CO2 BLDA-SCNC: 19.4 MMOL/L (ref 22–33)
CO2 SERPL-SCNC: 17.8 MMOL/L (ref 22–29)
CO2 SERPL-SCNC: 18.1 MMOL/L (ref 22–29)
COCAINE UR QL: NEGATIVE
COHGB MFR BLD: 0.9 % (ref 0–5)
COLOR UR: ABNORMAL
CREAT SERPL-MCNC: 1.01 MG/DL (ref 0.57–1)
CREAT SERPL-MCNC: 1.11 MG/DL (ref 0.57–1)
CRP SERPL-MCNC: 1.16 MG/DL (ref 0–0.5)
CRP SERPL-MCNC: 1.27 MG/DL (ref 0–0.5)
D-LACTATE SERPL-SCNC: 1.8 MMOL/L (ref 0.5–2)
D-LACTATE SERPL-SCNC: 2.9 MMOL/L (ref 0.5–2)
DEPRECATED RDW RBC AUTO: 44.9 FL (ref 37–54)
DEPRECATED RDW RBC AUTO: 46.2 FL (ref 37–54)
EGFRCR SERPLBLD CKD-EPI 2021: 54.6 ML/MIN/1.73
EGFRCR SERPLBLD CKD-EPI 2021: 61.1 ML/MIN/1.73
EOSINOPHIL # BLD AUTO: 0.02 10*3/MM3 (ref 0–0.4)
EOSINOPHIL # BLD AUTO: 0.04 10*3/MM3 (ref 0–0.4)
EOSINOPHIL NFR BLD AUTO: 0.1 % (ref 0.3–6.2)
EOSINOPHIL NFR BLD AUTO: 0.4 % (ref 0.3–6.2)
ERYTHROCYTE [DISTWIDTH] IN BLOOD BY AUTOMATED COUNT: 15 % (ref 12.3–15.4)
ERYTHROCYTE [DISTWIDTH] IN BLOOD BY AUTOMATED COUNT: 15.2 % (ref 12.3–15.4)
ERYTHROCYTE [SEDIMENTATION RATE] IN BLOOD: 7 MM/HR (ref 0–30)
ETHANOL BLD-MCNC: <10 MG/DL (ref 0–10)
ETHANOL BLD-MCNC: <10 MG/DL (ref 0–10)
ETHANOL UR QL: <0.01 %
ETHANOL UR QL: <0.01 %
FLUAV RNA RESP QL NAA+PROBE: NOT DETECTED
FLUAV SUBTYP SPEC NAA+PROBE: NOT DETECTED
FLUBV RNA ISLT QL NAA+PROBE: NOT DETECTED
FLUBV RNA RESP QL NAA+PROBE: NOT DETECTED
GLOBULIN UR ELPH-MCNC: 2.9 GM/DL
GLOBULIN UR ELPH-MCNC: 2.9 GM/DL
GLUCOSE SERPL-MCNC: 107 MG/DL (ref 65–99)
GLUCOSE SERPL-MCNC: 113 MG/DL (ref 65–99)
GLUCOSE UR STRIP-MCNC: NEGATIVE MG/DL
HCO3 BLDA-SCNC: 18.6 MMOL/L (ref 20–26)
HCT VFR BLD AUTO: 48.9 % (ref 34–46.6)
HCT VFR BLD AUTO: 50.1 % (ref 34–46.6)
HCT VFR BLD CALC: 46.8 % (ref 38–51)
HGB BLD-MCNC: 16 G/DL (ref 12–15.9)
HGB BLD-MCNC: 16.3 G/DL (ref 12–15.9)
HGB BLDA-MCNC: 15.3 G/DL (ref 13.5–17.5)
HGB UR QL STRIP.AUTO: NEGATIVE
HYALINE CASTS UR QL AUTO: ABNORMAL /LPF
IMM GRANULOCYTES # BLD AUTO: 0.08 10*3/MM3 (ref 0–0.05)
IMM GRANULOCYTES # BLD AUTO: 0.1 10*3/MM3 (ref 0–0.05)
IMM GRANULOCYTES NFR BLD AUTO: 0.7 % (ref 0–0.5)
IMM GRANULOCYTES NFR BLD AUTO: 0.8 % (ref 0–0.5)
INHALED O2 CONCENTRATION: 21 %
KETONES UR QL STRIP: ABNORMAL
LEUKOCYTE ESTERASE UR QL STRIP.AUTO: ABNORMAL
LIPASE SERPL-CCNC: 162 U/L (ref 13–60)
LIPASE SERPL-CCNC: 184 U/L (ref 13–60)
LYMPHOCYTES # BLD AUTO: 1.28 10*3/MM3 (ref 0.7–3.1)
LYMPHOCYTES # BLD AUTO: 2.18 10*3/MM3 (ref 0.7–3.1)
LYMPHOCYTES NFR BLD AUTO: 12.3 % (ref 19.6–45.3)
LYMPHOCYTES NFR BLD AUTO: 15.8 % (ref 19.6–45.3)
Lab: ABNORMAL
MAGNESIUM SERPL-MCNC: 2.5 MG/DL (ref 1.6–2.4)
MCH RBC QN AUTO: 27.5 PG (ref 26.6–33)
MCH RBC QN AUTO: 27.9 PG (ref 26.6–33)
MCHC RBC AUTO-ENTMCNC: 32.5 G/DL (ref 31.5–35.7)
MCHC RBC AUTO-ENTMCNC: 32.7 G/DL (ref 31.5–35.7)
MCV RBC AUTO: 84 FL (ref 79–97)
MCV RBC AUTO: 85.6 FL (ref 79–97)
METHADONE UR QL SCN: NEGATIVE
METHGB BLD QL: 0.2 % (ref 0–3)
MODALITY: ABNORMAL
MONOCYTES # BLD AUTO: 0.6 10*3/MM3 (ref 0.1–0.9)
MONOCYTES # BLD AUTO: 0.8 10*3/MM3 (ref 0.1–0.9)
MONOCYTES NFR BLD AUTO: 5.8 % (ref 5–12)
MONOCYTES NFR BLD AUTO: 5.8 % (ref 5–12)
NEUTROPHILS NFR BLD AUTO: 10.64 10*3/MM3 (ref 1.7–7)
NEUTROPHILS NFR BLD AUTO: 77.5 % (ref 42.7–76)
NEUTROPHILS NFR BLD AUTO: 8.41 10*3/MM3 (ref 1.7–7)
NEUTROPHILS NFR BLD AUTO: 80.6 % (ref 42.7–76)
NITRITE UR QL STRIP: NEGATIVE
NOTE: ABNORMAL
NRBC BLD AUTO-RTO: 0 /100 WBC (ref 0–0.2)
NRBC BLD AUTO-RTO: 0 /100 WBC (ref 0–0.2)
NT-PROBNP SERPL-MCNC: 1056 PG/ML (ref 0–900)
NT-PROBNP SERPL-MCNC: 3987 PG/ML (ref 0–900)
OPIATES UR QL: NEGATIVE
OXYCODONE UR QL SCN: NEGATIVE
OXYHGB MFR BLDV: 94.9 % (ref 94–99)
PCO2 BLDA: 25.7 MM HG (ref 35–45)
PCO2 TEMP ADJ BLD: ABNORMAL MM[HG]
PCP UR QL SCN: NEGATIVE
PH BLDA: 7.47 PH UNITS (ref 7.35–7.45)
PH UR STRIP.AUTO: <=5 [PH] (ref 5–8)
PH, TEMP CORRECTED: ABNORMAL
PLATELET # BLD AUTO: 166 10*3/MM3 (ref 140–450)
PLATELET # BLD AUTO: 251 10*3/MM3 (ref 140–450)
PMV BLD AUTO: 11.3 FL (ref 6–12)
PMV BLD AUTO: 12.3 FL (ref 6–12)
PO2 BLDA: 76.1 MM HG (ref 83–108)
PO2 TEMP ADJ BLD: ABNORMAL MM[HG]
POTASSIUM SERPL-SCNC: 4 MMOL/L (ref 3.5–5.2)
POTASSIUM SERPL-SCNC: 4.3 MMOL/L (ref 3.5–5.2)
PROPOXYPH UR QL: NEGATIVE
PROT SERPL-MCNC: 7.1 G/DL (ref 6–8.5)
PROT SERPL-MCNC: 7.5 G/DL (ref 6–8.5)
PROT UR QL STRIP: ABNORMAL
QT INTERVAL: 438 MS
QT INTERVAL: 454 MS
QTC INTERVAL: 455 MS
QTC INTERVAL: 475 MS
RBC # BLD AUTO: 5.82 10*6/MM3 (ref 3.77–5.28)
RBC # BLD AUTO: 5.85 10*6/MM3 (ref 3.77–5.28)
RBC # UR STRIP: ABNORMAL /HPF
REF LAB TEST METHOD: ABNORMAL
SAO2 % BLDCOA: 96 % (ref 94–99)
SARS-COV-2 RNA PNL SPEC NAA+PROBE: NOT DETECTED
SARS-COV-2 RNA RESP QL NAA+PROBE: NOT DETECTED
SODIUM SERPL-SCNC: 134 MMOL/L (ref 136–145)
SODIUM SERPL-SCNC: 137 MMOL/L (ref 136–145)
SP GR UR STRIP: 1.02 (ref 1–1.03)
SQUAMOUS #/AREA URNS HPF: ABNORMAL /HPF
T4 FREE SERPL-MCNC: 1.05 NG/DL (ref 0.93–1.7)
T4 FREE SERPL-MCNC: 1.34 NG/DL (ref 0.93–1.7)
TRICYCLICS UR QL SCN: NEGATIVE
TROPONIN T SERPL-MCNC: <0.01 NG/ML (ref 0–0.03)
TSH SERPL DL<=0.05 MIU/L-ACNC: 1.05 UIU/ML (ref 0.27–4.2)
TSH SERPL DL<=0.05 MIU/L-ACNC: 1.57 UIU/ML (ref 0.27–4.2)
UROBILINOGEN UR QL STRIP: ABNORMAL
VENTILATOR MODE: ABNORMAL
WBC # UR STRIP: ABNORMAL /HPF
WBC NRBC COR # BLD: 10.42 10*3/MM3 (ref 3.4–10.8)
WBC NRBC COR # BLD: 13.76 10*3/MM3 (ref 3.4–10.8)
YEAST URNS QL MICRO: ABNORMAL /HPF

## 2022-01-01 PROCEDURE — 71045 X-RAY EXAM CHEST 1 VIEW: CPT | Performed by: RADIOLOGY

## 2022-01-01 PROCEDURE — 87636 SARSCOV2 & INF A&B AMP PRB: CPT | Performed by: EMERGENCY MEDICINE

## 2022-01-01 PROCEDURE — 82550 ASSAY OF CK (CPK): CPT | Performed by: EMERGENCY MEDICINE

## 2022-01-01 PROCEDURE — 93005 ELECTROCARDIOGRAM TRACING: CPT | Performed by: EMERGENCY MEDICINE

## 2022-01-01 PROCEDURE — 96375 TX/PRO/DX INJ NEW DRUG ADDON: CPT

## 2022-01-01 PROCEDURE — 85652 RBC SED RATE AUTOMATED: CPT | Performed by: EMERGENCY MEDICINE

## 2022-01-01 PROCEDURE — 83880 ASSAY OF NATRIURETIC PEPTIDE: CPT | Performed by: PHYSICIAN ASSISTANT

## 2022-01-01 PROCEDURE — 93010 ELECTROCARDIOGRAM REPORT: CPT | Performed by: INTERNAL MEDICINE

## 2022-01-01 PROCEDURE — 93005 ELECTROCARDIOGRAM TRACING: CPT | Performed by: PHYSICIAN ASSISTANT

## 2022-01-01 PROCEDURE — 25010000002 ONDANSETRON PER 1 MG: Performed by: PHYSICIAN ASSISTANT

## 2022-01-01 PROCEDURE — 87636 SARSCOV2 & INF A&B AMP PRB: CPT | Performed by: PHYSICIAN ASSISTANT

## 2022-01-01 PROCEDURE — 99283 EMERGENCY DEPT VISIT LOW MDM: CPT

## 2022-01-01 PROCEDURE — 83605 ASSAY OF LACTIC ACID: CPT | Performed by: PHYSICIAN ASSISTANT

## 2022-01-01 PROCEDURE — 86140 C-REACTIVE PROTEIN: CPT | Performed by: PHYSICIAN ASSISTANT

## 2022-01-01 PROCEDURE — 86140 C-REACTIVE PROTEIN: CPT | Performed by: EMERGENCY MEDICINE

## 2022-01-01 PROCEDURE — 84484 ASSAY OF TROPONIN QUANT: CPT | Performed by: EMERGENCY MEDICINE

## 2022-01-01 PROCEDURE — 83690 ASSAY OF LIPASE: CPT | Performed by: PHYSICIAN ASSISTANT

## 2022-01-01 PROCEDURE — 85025 COMPLETE CBC W/AUTO DIFF WBC: CPT | Performed by: PHYSICIAN ASSISTANT

## 2022-01-01 PROCEDURE — 83735 ASSAY OF MAGNESIUM: CPT | Performed by: EMERGENCY MEDICINE

## 2022-01-01 PROCEDURE — 84443 ASSAY THYROID STIM HORMONE: CPT | Performed by: EMERGENCY MEDICINE

## 2022-01-01 PROCEDURE — 83690 ASSAY OF LIPASE: CPT | Performed by: EMERGENCY MEDICINE

## 2022-01-01 PROCEDURE — 36415 COLL VENOUS BLD VENIPUNCTURE: CPT

## 2022-01-01 PROCEDURE — 71045 X-RAY EXAM CHEST 1 VIEW: CPT

## 2022-01-01 PROCEDURE — 83050 HGB METHEMOGLOBIN QUAN: CPT

## 2022-01-01 PROCEDURE — 80053 COMPREHEN METABOLIC PANEL: CPT | Performed by: EMERGENCY MEDICINE

## 2022-01-01 PROCEDURE — 84439 ASSAY OF FREE THYROXINE: CPT | Performed by: PHYSICIAN ASSISTANT

## 2022-01-01 PROCEDURE — 84484 ASSAY OF TROPONIN QUANT: CPT | Performed by: PHYSICIAN ASSISTANT

## 2022-01-01 PROCEDURE — 74177 CT ABD & PELVIS W/CONTRAST: CPT

## 2022-01-01 PROCEDURE — 82375 ASSAY CARBOXYHB QUANT: CPT

## 2022-01-01 PROCEDURE — 25010000002 CEFTRIAXONE PER 250 MG: Performed by: PHYSICIAN ASSISTANT

## 2022-01-01 PROCEDURE — 80306 DRUG TEST PRSMV INSTRMNT: CPT | Performed by: PHYSICIAN ASSISTANT

## 2022-01-01 PROCEDURE — 82077 ASSAY SPEC XCP UR&BREATH IA: CPT | Performed by: EMERGENCY MEDICINE

## 2022-01-01 PROCEDURE — 84443 ASSAY THYROID STIM HORMONE: CPT | Performed by: PHYSICIAN ASSISTANT

## 2022-01-01 PROCEDURE — 96365 THER/PROPH/DIAG IV INF INIT: CPT

## 2022-01-01 PROCEDURE — 84439 ASSAY OF FREE THYROXINE: CPT | Performed by: EMERGENCY MEDICINE

## 2022-01-01 PROCEDURE — 25010000002 IOPAMIDOL 61 % SOLUTION: Performed by: STUDENT IN AN ORGANIZED HEALTH CARE EDUCATION/TRAINING PROGRAM

## 2022-01-01 PROCEDURE — 74177 CT ABD & PELVIS W/CONTRAST: CPT | Performed by: RADIOLOGY

## 2022-01-01 PROCEDURE — 87040 BLOOD CULTURE FOR BACTERIA: CPT | Performed by: PHYSICIAN ASSISTANT

## 2022-01-01 PROCEDURE — 82077 ASSAY SPEC XCP UR&BREATH IA: CPT | Performed by: PHYSICIAN ASSISTANT

## 2022-01-01 PROCEDURE — 80053 COMPREHEN METABOLIC PANEL: CPT | Performed by: PHYSICIAN ASSISTANT

## 2022-01-01 PROCEDURE — 81001 URINALYSIS AUTO W/SCOPE: CPT | Performed by: PHYSICIAN ASSISTANT

## 2022-01-01 PROCEDURE — 36600 WITHDRAWAL OF ARTERIAL BLOOD: CPT

## 2022-01-01 PROCEDURE — 82805 BLOOD GASES W/O2 SATURATION: CPT

## 2022-01-01 PROCEDURE — 83880 ASSAY OF NATRIURETIC PEPTIDE: CPT | Performed by: EMERGENCY MEDICINE

## 2022-01-01 PROCEDURE — 85025 COMPLETE CBC W/AUTO DIFF WBC: CPT | Performed by: EMERGENCY MEDICINE

## 2022-01-01 PROCEDURE — 73562 X-RAY EXAM OF KNEE 3: CPT

## 2022-01-01 PROCEDURE — 73562 X-RAY EXAM OF KNEE 3: CPT | Performed by: RADIOLOGY

## 2022-01-01 PROCEDURE — 99204 OFFICE O/P NEW MOD 45 MIN: CPT | Performed by: INTERNAL MEDICINE

## 2022-01-01 RX ORDER — DICYCLOMINE HCL 20 MG
20 TABLET ORAL EVERY 8 HOURS PRN
Qty: 30 TABLET | Refills: 0 | Status: SHIPPED | OUTPATIENT
Start: 2022-01-01

## 2022-01-01 RX ORDER — CEFDINIR 300 MG/1
300 CAPSULE ORAL 2 TIMES DAILY
Qty: 20 CAPSULE | Refills: 0 | Status: SHIPPED | OUTPATIENT
Start: 2022-01-01 | End: 2022-01-01

## 2022-01-01 RX ORDER — ONDANSETRON 2 MG/ML
4 INJECTION INTRAMUSCULAR; INTRAVENOUS ONCE
Status: COMPLETED | OUTPATIENT
Start: 2022-01-01 | End: 2022-01-01

## 2022-01-01 RX ORDER — SODIUM CHLORIDE 0.9 % (FLUSH) 0.9 %
10 SYRINGE (ML) INJECTION AS NEEDED
Status: DISCONTINUED | OUTPATIENT
Start: 2022-01-01 | End: 2022-01-01 | Stop reason: HOSPADM

## 2022-01-01 RX ORDER — LISINOPRIL 20 MG/1
20 TABLET ORAL DAILY
COMMUNITY

## 2022-01-01 RX ORDER — ONDANSETRON 4 MG/1
4 TABLET, ORALLY DISINTEGRATING ORAL EVERY 8 HOURS PRN
Qty: 21 TABLET | Refills: 0 | Status: SHIPPED | OUTPATIENT
Start: 2022-01-01 | End: 2022-01-01

## 2022-01-01 RX ORDER — PROCHLORPERAZINE MALEATE 10 MG
10 TABLET ORAL EVERY 6 HOURS PRN
Qty: 30 TABLET | Refills: 0 | Status: SHIPPED | OUTPATIENT
Start: 2022-01-01

## 2022-01-01 RX ORDER — ONDANSETRON 4 MG/1
8 TABLET, ORALLY DISINTEGRATING ORAL ONCE
Status: DISCONTINUED | OUTPATIENT
Start: 2022-01-01 | End: 2022-01-01

## 2022-01-01 RX ADMIN — IOPAMIDOL 100 ML: 612 INJECTION, SOLUTION INTRAVENOUS at 13:00

## 2022-01-01 RX ADMIN — CEFTRIAXONE 2 G: 2 INJECTION, POWDER, FOR SOLUTION INTRAMUSCULAR; INTRAVENOUS at 14:48

## 2022-01-01 RX ADMIN — ONDANSETRON 4 MG: 2 INJECTION INTRAMUSCULAR; INTRAVENOUS at 11:59

## 2022-01-01 RX ADMIN — SODIUM CHLORIDE 1000 ML: 9 INJECTION, SOLUTION INTRAVENOUS at 11:59

## 2022-01-01 RX ADMIN — SODIUM CHLORIDE 1000 ML: 9 INJECTION, SOLUTION INTRAVENOUS at 09:32

## 2022-06-07 NOTE — ED PROVIDER NOTES
Subjective   66-year-old female with past medical history of thyroid disease, right femur fracture, and hypertension presents to the emergency room with right knee pain after a fall she sustained to the hospital parking lot.  Patient states that she brought her  to the cancer center for cancer treatment and went out to get the car when she tripped on the curb.  She states that she came down directly on her right knee.  She states that she is concerned because she has had a previous right femur surgery.  She states pain is elicited with bearing weight.  Denies any alleviating factors.  Denies any other complaints or concerns at this time.      History provided by:  Patient   used: No        Review of Systems   Constitutional: Negative.  Negative for fever.   HENT: Negative.    Respiratory: Negative.    Cardiovascular: Negative.  Negative for chest pain.   Gastrointestinal: Negative.  Negative for abdominal pain.   Endocrine: Negative.    Genitourinary: Negative.  Negative for dysuria.   Musculoskeletal:        (+) right knee pain   Skin: Negative.    Neurological: Negative.    Psychiatric/Behavioral: Negative.    All other systems reviewed and are negative.      Past Medical History:   Diagnosis Date   • Disease of thyroid gland    • Femur fracture, right (CMS/HCC)    • Hypertension        No Known Allergies    Past Surgical History:   Procedure Laterality Date   • FEMUR OPEN REDUCTION INTERNAL FIXATION Right 11/18/2017    Procedure: FEMUR OPEN REDUCTION INTERNAL FIXATION;  Surgeon: Ian Cifuentes MD;  Location: [facility];  Service:    • TONSILLECTOMY         Family History   Problem Relation Age of Onset   • Cancer Father    • Breast cancer Neg Hx        Social History     Socioeconomic History   • Marital status:    Tobacco Use   • Smoking status: Former Smoker   • Smokeless tobacco: Never Used   Substance and Sexual Activity   • Alcohol use: No   • Drug use: No            Objective   Physical Exam  Vitals and nursing note reviewed.   Constitutional:       General: She is not in acute distress.     Appearance: She is well-developed. She is not diaphoretic.   HENT:      Head: Normocephalic and atraumatic.      Right Ear: External ear normal.      Left Ear: External ear normal.      Nose: Nose normal.   Eyes:      Conjunctiva/sclera: Conjunctivae normal.      Pupils: Pupils are equal, round, and reactive to light.   Neck:      Vascular: No JVD.      Trachea: No tracheal deviation.   Cardiovascular:      Rate and Rhythm: Normal rate and regular rhythm.      Heart sounds: Normal heart sounds. No murmur heard.  Pulmonary:      Effort: Pulmonary effort is normal. No respiratory distress.      Breath sounds: Normal breath sounds. No wheezing.   Abdominal:      General: Bowel sounds are normal.      Palpations: Abdomen is soft.      Tenderness: There is no abdominal tenderness.   Musculoskeletal:         General: No deformity. Normal range of motion.      Cervical back: Normal range of motion and neck supple.      Right lower leg: Tenderness and bony tenderness present.      Left lower leg: Normal.        Legs:       Comments: RLQ neurovascularly intact   Skin:     General: Skin is warm and dry.      Coloration: Skin is not pale.      Findings: No erythema or rash.   Neurological:      Mental Status: She is alert and oriented to person, place, and time.      Cranial Nerves: No cranial nerve deficit.   Psychiatric:         Behavior: Behavior normal.         Thought Content: Thought content normal.         Splint - Cast - Strapping    Date/Time: 6/7/2022 3:08 PM  Performed by: Mar Ramirez PA-C  Authorized by: Ko Boswell DO     Consent:     Consent obtained:  Verbal    Consent given by:  Patient    Risks, benefits, and alternatives were discussed: yes      Risks discussed:  Discoloration, numbness, pain and swelling    Alternatives discussed:  No treatment, delayed  treatment, alternative treatment, observation and referral  Universal protocol:     Procedure explained and questions answered to patient or proxy's satisfaction: yes      Relevant documents present and verified: yes      Test results available: yes      Imaging studies available: yes      Required blood products, implants, devices, and special equipment available: yes      Site/side marked: yes      Immediately prior to procedure a time out was called: yes      Patient identity confirmed:  Verbally with patient, arm band, provided demographic data and hospital-assigned identification number  Pre-procedure details:     Distal neurologic exam:  Normal    Distal perfusion: distal pulses strong    Procedure details:     Location:  Leg    Leg location: right long leg.    Cast type:  Long leg    Supplies:  Cotton padding, elastic bandage and fiberglass    Splint applied and adjusted personally by me: Splint applied by tech, checked by me.    Post-procedure details:     Distal neurologic exam:  Normal    Distal perfusion: distal pulses strong      Procedure completion:  Tolerated well, no immediate complications  Comments:      Patient tolerated splint application well, per tech. No acute complications. Neurovascularly intact.                 ED Course  ED Course as of 06/07/22 1512   Tue Jun 07, 2022   1459 XR Knee 3 View Right [TK]   1503 Spoke with Dr. Delaney, images reviewed and he recommends long leg posterior splint & outpatient follow up in clinic. [TK]      ED Course User Index  [TK] Mar Ramirez, SHARONDA                                                 MDM  Number of Diagnoses or Management Options  Closed fracture of proximal end of right tibia, unspecified fracture morphology, initial encounter: new and requires workup     Amount and/or Complexity of Data Reviewed  Tests in the radiology section of CPT®: reviewed and ordered  Discuss the patient with other providers: yes (Rhett)    Risk of Complications,  Morbidity, and/or Mortality  Presenting problems: moderate  Diagnostic procedures: moderate  Management options: moderate    Patient Progress  Patient progress: stable      Final diagnoses:   Closed fracture of proximal end of right tibia, unspecified fracture morphology, initial encounter       ED Disposition  ED Disposition     ED Disposition   Discharge    Condition   Stable    Comment   --             Rafael Hutton, DO  [address] [place] [id number] [phone]    In 2 days           Medication List      No changes were made to your prescriptions during this visit.          Mar Ramirez, PA-C  06/07/22 1512

## 2022-08-16 NOTE — ED PROVIDER NOTES
Subjective   This is a 66 year old female patient who presents to the ER with chief complaint of nausea and vomiting. PMH significant for HTN, hypothyroidism and chronic pain. For 6 weeks, the patient has had generalized abdominal pain, nausea and vomiting multiple times daily. Denies diarrhea, constipation, urinary symptoms, fever. Denies chest pain, SOB.           Review of Systems   Constitutional: Negative.  Negative for fever.   HENT: Negative.    Respiratory: Negative.    Cardiovascular: Negative.  Negative for chest pain.   Gastrointestinal: Positive for abdominal pain, nausea and vomiting. Negative for abdominal distention, anal bleeding, blood in stool, constipation, diarrhea and rectal pain.   Endocrine: Negative.    Genitourinary: Negative.  Negative for dysuria.   Skin: Negative.    Neurological: Negative.    Psychiatric/Behavioral: Negative.    All other systems reviewed and are negative.      Past Medical History:   Diagnosis Date   • Disease of thyroid gland    • Femur fracture, right (CMS/HCC)    • Hypertension        No Known Allergies    Past Surgical History:   Procedure Laterality Date   • FEMUR OPEN REDUCTION INTERNAL FIXATION Right 11/18/2017    Procedure: FEMUR OPEN REDUCTION INTERNAL FIXATION;  Surgeon: Ian Cifuentes MD;  Location: [facility];  Service:    • TONSILLECTOMY         Family History   Problem Relation Age of Onset   • Cancer Father    • Breast cancer Neg Hx        Social History     Socioeconomic History   • Marital status:    Tobacco Use   • Smoking status: Former Smoker   • Smokeless tobacco: Never Used   Substance and Sexual Activity   • Alcohol use: No   • Drug use: No           Objective   Physical Exam  Vitals and nursing note reviewed.   Constitutional:       General: She is not in acute distress.     Appearance: She is well-developed. She is not diaphoretic.   HENT:      Head: Normocephalic and atraumatic.      Right Ear: External ear normal.      Left Ear:  External ear normal.      Nose: Nose normal.   Eyes:      Conjunctiva/sclera: Conjunctivae normal.      Pupils: Pupils are equal, round, and reactive to light.   Neck:      Vascular: No JVD.      Trachea: No tracheal deviation.   Cardiovascular:      Rate and Rhythm: Normal rate and regular rhythm.      Heart sounds: Normal heart sounds. No murmur heard.  Pulmonary:      Effort: Pulmonary effort is normal. No respiratory distress.      Breath sounds: Normal breath sounds. No wheezing.   Abdominal:      General: Bowel sounds are normal.      Palpations: Abdomen is soft.      Tenderness: There is no abdominal tenderness. There is no right CVA tenderness, left CVA tenderness, guarding or rebound.   Musculoskeletal:         General: No deformity. Normal range of motion.      Cervical back: Normal range of motion and neck supple.   Skin:     General: Skin is warm and dry.      Coloration: Skin is not pale.      Findings: No erythema or rash.   Neurological:      Mental Status: She is alert and oriented to person, place, and time.      Cranial Nerves: No cranial nerve deficit.   Psychiatric:         Behavior: Behavior normal.         Thought Content: Thought content normal.         Procedures           ED Course  ED Course as of 08/16/22 1607   Tue Aug 16, 2022   1143 EKG noted sinus rhythm.  65 bpm.  QTc 455.  No acute ST elevation.  ST depression in the inferior and lateral leads. [SF]   1334 XR Chest 1 View  IMPRESSION:    Stable unremarkable chest.     This report was finalized on 8/16/2022 11:23 AM by Dr. Nasir Hardin MD [MM]   0639 CT Abdomen Pelvis With Contrast  IMPRESSION:  1.  Right posterior basilar airspace disease which favors atelectasis or  pneumonia. Follow-up to resolution is recommended.  2.  Atrophy left kidney.  3.  Stable left adrenal hypertrophy and probable small right adrenal  nodules.  4.  7.6 cm cyst left ovary for which follow-up ultrasound is  recommended.  5.  Very mild fatty infiltration  of liver.  6.  Otherwise no acute findings within abdomen or pelvis. Other  incidental findings as above.     This report was finalized on 8/16/2022 1:34 PM by Dr. Nasir Hardin MD [MM]   8622 Patient diagnosed with RLL pneumonia and nausea/ vomiting. Feeling much better. Will be d/c home with rx for omnicef and zofran. Will f/u with PCP in 2 days or return to ER if symptoms worsen.  [MM]      ED Course User Index  [MM] Lubna Goldberg PA  [SF] Ko Boswell, DO                                           MDM  Number of Diagnoses or Management Options     Amount and/or Complexity of Data Reviewed  Clinical lab tests: ordered and reviewed  Tests in the radiology section of CPT®: ordered and reviewed  Decide to obtain previous medical records or to obtain history from someone other than the patient: yes  Discuss the patient with other providers: yes        Final diagnoses:   Pneumonia of right lower lobe due to infectious organism   Nausea and vomiting, unspecified vomiting type       ED Disposition  ED Disposition     ED Disposition   Discharge    Condition   Stable    Comment   --             Sukhdev Ramirez MD  [address] [place] [address]  [phone]    In 2 days           Medication List      New Prescriptions    cefdinir 300 MG capsule  Commonly known as: OMNICEF  Take 1 capsule by mouth 2 (Two) Times a Day for 10 days.     ondansetron ODT 4 MG disintegrating tablet  Commonly known as: ZOFRAN-ODT  Place 1 tablet on the tongue Every 8 (Eight) Hours As Needed for Nausea for up to 7 days.           Where to Get Your Medications      These medications were sent to [facility] [address] [place] [address] [phone] [place] [phone] [place] [address]    Phone: [phone]   · cefdinir 300 MG capsule  · ondansetron ODT 4 MG disintegrating tablet          Lubna Goldberg PA  08/16/22 4785

## 2022-08-16 NOTE — DISCHARGE INSTRUCTIONS
Please take your medications as prescribed and follow up with your PCP in 2 days or return to ER if symptoms worsen.

## 2022-08-19 NOTE — OUTREACH NOTE
AMBULATORY CASE MANAGEMENT NOTE    Name and Relationship of Patient/Support Person: Gilda Galdamez A - Self     Patient Outreach    RN-ACM outreach for follow-up of ED visit 8/16/22; pt dx'd with pneumonia, has hx COPD. Patient reports she has been taking medications as prescribed and is feeling a lot better. AVS instructions and 24/7 nurse line reviewed, pt v/u; Education provided on symptom mgmt, prevention of disease exac; pt v/u. Pt to follow-up with PCP.    Adult Patient Profile  Questions/Answers    Flowsheet Row Most Recent Value   Symptoms/Conditions Managed at Home respiratory   Barriers to Managing Health none   Respiratory Symptoms/Conditions COPD   Respiratory Management Strategies medication therapy, breathing exercise, adequate rest   Taking Medications Not Prescribed no   Missed Doses of Prescribed Medications During Past Week no   Taken Prescribed Medications at Different Time or Schedule During Past Week no   Taken More or Less Medication Than Prescribed no   Barriers to Taking Medication as Prescribed none          Education Documentation  Unresolved/Worsening Symptoms, taught by Teresa Jameson, RN at 8/19/2022  3:51 PM.  Learner: Patient  Readiness: Acceptance  Method: Explanation, Teach Back  Response: Verbalizes Understanding    VTE Prevention, taught by Teresa Jameson, RN at 8/19/2022  3:51 PM.  Learner: Patient  Readiness: Acceptance  Method: Explanation, Teach Back  Response: Verbalizes Understanding    Tobacco Use, Smoke Exposure, taught by Teresa Jameson, RN at 8/19/2022  3:51 PM.  Learner: Patient  Readiness: Acceptance  Method: Explanation, Teach Back  Response: Verbalizes Understanding    Pulmonary Hygiene, taught by Teresa Jameson, RN at 8/19/2022  3:51 PM.  Learner: Patient  Readiness: Acceptance  Method: Explanation, Teach Back  Response: Verbalizes Understanding    Medication Management, taught by Teresa Jameson, RN at 8/19/2022  3:51 PM.  Learner: Patient  Readiness:  Acceptance  Method: Explanation, Teach Back  Response: Verbalizes Understanding    Infection Prevention, taught by Daniel Jameson, RN at 8/19/2022  3:51 PM.  Learner: Patient  Readiness: Acceptance  Method: Explanation, Teach Back  Response: Verbalizes Understanding    Immunizations, taught by Daniel Jameson, RN at 8/19/2022  3:51 PM.  Learner: Patient  Readiness: Acceptance  Method: Explanation, Teach Back  Response: Verbalizes Understanding    Fluid/Food Intake, taught by Daniel Jameson, RN at 8/19/2022  3:51 PM.  Learner: Patient  Readiness: Acceptance  Method: Explanation, Teach Back  Response: Verbalizes Understanding    Activity, taught by Daniel Jameson, RN at 8/19/2022  3:51 PM.  Learner: Patient  Readiness: Acceptance  Method: Explanation, Teach Back  Response: Verbalizes Understanding          DANIEL HATHAWAY  Ambulatory Case Management    8/19/2022, 15:54 EDT

## 2022-08-23 NOTE — ED PROVIDER NOTES
Subjective     History provided by:  Patient   used: No    Weakness - Generalized  Severity:  Mild  Onset quality:  Gradual  Timing:  Constant  Progression:  Worsening  Chronicity:  Chronic  Context: not alcohol use, not allergies, not change in medication, not decreased sleep, not dehydration, not drug use, not increased activity, not pinched nerve, not recent infection, not stress and not urinary tract infection    Relieved by:  Nothing  Worsened by:  Nothing  Ineffective treatments:  None tried  Associated symptoms: no abdominal pain, no anorexia, no aphasia, no arthralgias, no ataxia, no chest pain, no cough, no diarrhea, no difficulty walking, no dizziness, no drooling, no dysphagia, no dysuria, no numbness in extremities, no falls, no fever, no foul-smelling urine, no frequency, no headaches, no hematochezia, no lethargy, no loss of consciousness, no melena, no myalgias, no nausea, no near-syncope, no seizures, no sensory-motor deficit, no shortness of breath, no stroke symptoms, no syncope, no urgency, no vision change and no vomiting    Risk factors: no anemia, no congestive heart failure, no coronary artery disease, no diabetes, no excessive menstruation, no family hx of stroke, no heart disease, no neurologic disease, no new medications and no recent stressors        Review of Systems   Constitutional: Negative for activity change, appetite change, chills, diaphoresis, fatigue and fever.   HENT: Negative for congestion, drooling, ear pain and sore throat.    Eyes: Negative for redness.   Respiratory: Negative for cough, chest tightness, shortness of breath and wheezing.    Cardiovascular: Negative for chest pain, palpitations, leg swelling, syncope and near-syncope.   Gastrointestinal: Negative for abdominal pain, anorexia, diarrhea, dysphagia, hematochezia, melena, nausea and vomiting.   Genitourinary: Negative for dysuria, frequency and urgency.   Musculoskeletal: Negative for  arthralgias, back pain, falls, myalgias and neck pain.   Skin: Negative for pallor, rash and wound.   Neurological: Negative for dizziness, seizures, loss of consciousness, speech difficulty, weakness and headaches.   Psychiatric/Behavioral: Negative for agitation, behavioral problems, confusion and decreased concentration.   All other systems reviewed and are negative.      Past Medical History:   Diagnosis Date   • Disease of thyroid gland    • Femur fracture, right (CMS/HCC)    • Hypertension        No Known Allergies    Past Surgical History:   Procedure Laterality Date   • FEMUR OPEN REDUCTION INTERNAL FIXATION Right 11/18/2017    Procedure: FEMUR OPEN REDUCTION INTERNAL FIXATION;  Surgeon: Ian Cifuentes MD;  Location: [facility];  Service:    • TONSILLECTOMY         Family History   Problem Relation Age of Onset   • Cancer Father    • Breast cancer Neg Hx        Social History     Socioeconomic History   • Marital status:    Tobacco Use   • Smoking status: Former Smoker   • Smokeless tobacco: Never Used   Substance and Sexual Activity   • Alcohol use: No   • Drug use: No           Objective   Physical Exam  Vitals and nursing note reviewed.   Constitutional:       General: She is not in acute distress.     Appearance: Normal appearance. She is well-developed. She is not toxic-appearing or diaphoretic.   HENT:      Head: Normocephalic and atraumatic.      Right Ear: External ear normal.      Left Ear: External ear normal.      Nose: Nose normal.      Mouth/Throat:      Pharynx: No oropharyngeal exudate.      Tonsils: No tonsillar exudate.   Eyes:      General: Lids are normal.      Conjunctiva/sclera: Conjunctivae normal.      Pupils: Pupils are equal, round, and reactive to light.   Neck:      Thyroid: No thyromegaly.   Cardiovascular:      Rate and Rhythm: Normal rate and regular rhythm.      Pulses: Normal pulses.      Heart sounds: Normal heart sounds, S1 normal and S2 normal.   Pulmonary:       Effort: Pulmonary effort is normal. No tachypnea or respiratory distress.      Breath sounds: Normal breath sounds. No decreased breath sounds, wheezing or rales.   Chest:      Chest wall: No tenderness.   Abdominal:      General: Bowel sounds are normal. There is no distension.      Palpations: Abdomen is soft.      Tenderness: There is no abdominal tenderness. There is no guarding or rebound.   Musculoskeletal:         General: No tenderness or deformity. Normal range of motion.      Cervical back: Full passive range of motion without pain, normal range of motion and neck supple.   Lymphadenopathy:      Cervical: No cervical adenopathy.   Skin:     General: Skin is warm and dry.      Coloration: Skin is not pale.      Findings: No erythema or rash.   Neurological:      Mental Status: She is alert and oriented to person, place, and time.      GCS: GCS eye subscore is 4. GCS verbal subscore is 5. GCS motor subscore is 6.      Cranial Nerves: No cranial nerve deficit.      Sensory: No sensory deficit.   Psychiatric:         Speech: Speech normal.         Behavior: Behavior normal.         Thought Content: Thought content normal.         Judgment: Judgment normal.         Procedures           ED Course  ED Course as of 08/23/22 0748   Sat Aug 20, 2022   1013 XR Chest 1 View    IMPRESSION:  Subtle opacity left lung base unclear if this is air space disease artifact or scarring. An early developing infiltrate is possible. Correlate clinically. [ES]   1355 ECG 12 Lead  Vent. Rate :  66 BPM     Atrial Rate :  66 BPM     P-R Int : 126 ms          QRS Dur :  80 ms      QT Int : 454 ms       P-R-T Axes :  72  31 241 degrees     QTc Int : 475 ms     Normal sinus rhythm  ST & T wave abnormality, consider inferior ischemia  ST & T wave abnormality, consider anterolateral ischemia  Prolonged QT  Abnormal ECG  When compared with ECG of 16-AUG-2022 10:58,  No significant change was found [ES]      ED Course User Index  [ES]  Harish Ortega MD                                           MDM  Number of Diagnoses or Management Options  Chronic pancreatitis, unspecified pancreatitis type (HCC): established and worsening  Nausea and vomiting, unspecified vomiting type: new and requires workup     Amount and/or Complexity of Data Reviewed  Clinical lab tests: reviewed and ordered  Tests in the radiology section of CPT®: reviewed and ordered  Tests in the medicine section of CPT®: reviewed and ordered  Review and summarize past medical records: yes  Independent visualization of images, tracings, or specimens: yes    Risk of Complications, Morbidity, and/or Mortality  Presenting problems: moderate  Diagnostic procedures: moderate  Management options: moderate    Patient Progress  Patient progress: stable      Final diagnoses:   Nausea and vomiting, unspecified vomiting type   Chronic pancreatitis, unspecified pancreatitis type (HCC)       ED Disposition  ED Disposition     ED Disposition   Discharge    Condition   Stable    Comment   --             Cecilia Collins MD  [address] [place] [address] [address]  [place] [address]  [phone]    Schedule an appointment as soon as possible for a visit in 1 day  EVALUATE         Medication List      New Prescriptions    dicyclomine 20 MG tablet  Commonly known as: BENTYL  Take 1 tablet by mouth Every 8 (Eight) Hours As Needed (spasm).     prochlorperazine 10 MG tablet  Commonly known as: COMPAZINE  Take 1 tablet by mouth Every 6 (Six) Hours As Needed for Nausea or Vomiting.           Where to Get Your Medications      These medications were sent to [facility] [address] [place] [address] - [address] [place] [address] - [phone]  - [phone] [address] [address]    Phone: [phone]   · dicyclomine 20 MG tablet  · prochlorperazine 10 MG tablet          Harish Ortega MD  08/23/22 07

## 2022-08-25 NOTE — PROGRESS NOTES
Subjective     Gilda Galdamez is a 66 y.o. female who presents to the office today as a consultation from Self Referring for evaluation of Pancreatitis        History of Present Illness:  The patient presents for evaluation of nausea and vomiting for the past 7 weeks which began soon after her knee surgery..  She was given phenergan and abx by her PCP.  She was also seen in the ER twice.  The first time she was given compazine and Abx even though previously treated with the same regimen by her PCP.  The second visit to the ER, she was told at the time of her last visit she had pancreatitis. A CT scan was performed that did not show pancreatitis.  She did have a mildly elevated lipase.  She has a intermittent feeling of nausea throughout the day.  The vomitus is yellow or brown fluid.  She denies heartburn or reflux.  She has lost 40 lbs 7 weeks.  Nothing seems to make her symptoms better except phenergan.  She is on Percocet due to right knee pain after falling on her knee in the parking lot.  It was repaired in July. She has been on Percocet since.  She has bowel movements.  She is able to eat ice cream sandwich and a bowl of frosted flakes. She has never had an EGD or colonoscopy.  She only takes Percocet twice a day.  There is associated early satiety.  She is not diabetic. Zofran does not help.      Review of Systems:  Review of Systems   Constitutional: Positive for appetite change.   HENT: Negative.    Eyes: Negative.    Respiratory: Negative.    Cardiovascular: Negative.    Gastrointestinal: Positive for nausea and vomiting. Negative for abdominal distention, abdominal pain, anal bleeding, blood in stool, constipation and diarrhea.   Endocrine: Negative.    Genitourinary: Negative.    Musculoskeletal: Negative.    Skin: Negative.    Allergic/Immunologic: Negative.    Neurological: Negative.    Hematological: Negative.    Psychiatric/Behavioral: Negative.        Past Medical History:  Past Medical History:  "  Diagnosis Date   • Disease of thyroid gland    • Femur fracture, right (HCC)    • Hypertension        Past Surgical History:  Past Surgical History:   Procedure Laterality Date   • FEMUR OPEN REDUCTION INTERNAL FIXATION Right 11/18/2017    Procedure: FEMUR OPEN REDUCTION INTERNAL FIXATION;  Surgeon: Ian Cifuentes MD;  Location: [facility];  Service:    • TONSILLECTOMY         Family History:  Family History   Problem Relation Age of Onset   • Cancer Father    • Breast cancer Neg Hx        Social History:  Social History     Socioeconomic History   • Marital status:    Tobacco Use   • Smoking status: Former Smoker   • Smokeless tobacco: Never Used   Substance and Sexual Activity   • Alcohol use: No   • Drug use: No       Current Medication List:    Current Outpatient Medications:   •  dicyclomine (BENTYL) 20 MG tablet, Take 1 tablet by mouth Every 8 (Eight) Hours As Needed (spasm)., Disp: 30 tablet, Rfl: 0  •  levothyroxine (SYNTHROID, LEVOTHROID) 25 MCG tablet, Take 25 mcg by mouth Daily., Disp: , Rfl:   •  lisinopril (PRINIVIL,ZESTRIL) 20 MG tablet, Take 20 mg by mouth Daily., Disp: , Rfl:   •  oxyCODONE-acetaminophen (PERCOCET) 5-325 MG per tablet, Take 1 tablet by mouth Every 6 (Six) Hours As Needed (pain)., Disp: 30 tablet, Rfl: 0  •  prochlorperazine (COMPAZINE) 10 MG tablet, Take 1 tablet by mouth Every 6 (Six) Hours As Needed for Nausea or Vomiting., Disp: 30 tablet, Rfl: 0    Allergies:   Patient has no known allergies.    Vitals:  Ht 170.2 cm (67\")   Wt 60.3 kg (133 lb)   BMI 20.83 kg/m²     Physical Exam:  Physical Exam  Constitutional:       Appearance: She is normal weight.   HENT:      Head: Normocephalic and atraumatic.      Nose: Nose normal. No congestion or rhinorrhea.   Eyes:      General: No scleral icterus.     Extraocular Movements: Extraocular movements intact.      Conjunctiva/sclera: Conjunctivae normal.      Pupils: Pupils are equal, round, and reactive to light. "   Cardiovascular:      Rate and Rhythm: Normal rate and regular rhythm.      Pulses: Normal pulses.      Heart sounds: Normal heart sounds.   Pulmonary:      Effort: Pulmonary effort is normal.      Breath sounds: Normal breath sounds.   Abdominal:      General: Abdomen is flat. Bowel sounds are normal. There is no distension.      Palpations: Abdomen is soft. There is no shifting dullness, fluid wave, hepatomegaly, splenomegaly, mass or pulsatile mass.      Tenderness: There is no abdominal tenderness. There is no guarding or rebound.      Hernia: No hernia is present.   Musculoskeletal:         General: No swelling or tenderness.      Cervical back: Normal range of motion and neck supple.      Comments: The patient is in a wheelchair for stability   Skin:     General: Skin is warm and dry.      Coloration: Skin is not jaundiced.   Neurological:      General: No focal deficit present.      Mental Status: She is alert and oriented to person, place, and time.   Psychiatric:         Mood and Affect: Mood normal.         Behavior: Behavior normal.         Results Review:  Lab Results:   Admission on 08/20/2022, Discharged on 08/20/2022   Component Date Value Ref Range Status   • Glucose 08/20/2022 113 (A) 65 - 99 mg/dL Final   • BUN 08/20/2022 36 (A) 8 - 23 mg/dL Final   • Creatinine 08/20/2022 1.01 (A) 0.57 - 1.00 mg/dL Final   • Sodium 08/20/2022 137  136 - 145 mmol/L Final   • Potassium 08/20/2022 4.0  3.5 - 5.2 mmol/L Final    Slight hemolysis detected by analyzer. Results may be affected.   • Chloride 08/20/2022 98  98 - 107 mmol/L Final   • CO2 08/20/2022 18.1 (A) 22.0 - 29.0 mmol/L Final   • Calcium 08/20/2022 9.8  8.6 - 10.5 mg/dL Final   • Total Protein 08/20/2022 7.1  6.0 - 8.5 g/dL Final   • Albumin 08/20/2022 4.25  3.50 - 5.20 g/dL Final   • ALT (SGPT) 08/20/2022 7  1 - 33 U/L Final   • AST (SGOT) 08/20/2022 22  1 - 32 U/L Final   • Alkaline Phosphatase 08/20/2022 84  39 - 117 U/L Final   • Total Bilirubin  08/20/2022 0.9  0.0 - 1.2 mg/dL Final   • Globulin 08/20/2022 2.9  gm/dL Final   • A/G Ratio 08/20/2022 1.5  g/dL Final   • BUN/Creatinine Ratio 08/20/2022 35.6 (A) 7.0 - 25.0 Final   • Anion Gap 08/20/2022 20.9 (A) 5.0 - 15.0 mmol/L Final   • eGFR 08/20/2022 61.1  >60.0 mL/min/1.73 Final    National Kidney Foundation and American Society of Nephrology (ASN) Task Force recommended calculation based on the Chronic Kidney Disease Epidemiology Collaboration (CKD-EPI) equation refit without adjustment for race.   • TSH 08/20/2022 1.570  0.270 - 4.200 uIU/mL Final   • Free T4 08/20/2022 1.05  0.93 - 1.70 ng/dL Final   • Magnesium 08/20/2022 2.5 (A) 1.6 - 2.4 mg/dL Final   • Creatine Kinase 08/20/2022 56  20 - 180 U/L Final   • Ethanol 08/20/2022 <10  0 - 10 mg/dL Final   • Ethanol % 08/20/2022 <0.010  % Final   • C-Reactive Protein 08/20/2022 1.27 (A) 0.00 - 0.50 mg/dL Final   • Sed Rate 08/20/2022 7  0 - 30 mm/hr Final   • Troponin T 08/20/2022 <0.010  0.000 - 0.030 ng/mL Final   • Troponin T 08/20/2022 <0.010  0.000 - 0.030 ng/mL Final   • proBNP 08/20/2022 3,987.0 (A) 0.0 - 900.0 pg/mL Final   • Lipase 08/20/2022 162 (A) 13 - 60 U/L Final   • COVID19 08/20/2022 Not Detected  Not Detected - Ref. Range Final   • Influenza A PCR 08/20/2022 Not Detected  Not Detected Final   • Influenza B PCR 08/20/2022 Not Detected  Not Detected Final   • QT Interval 08/20/2022 454  ms Final   • QTC Interval 08/20/2022 475  ms Final   • WBC 08/20/2022 10.42  3.40 - 10.80 10*3/mm3 Final   • RBC 08/20/2022 5.82 (A) 3.77 - 5.28 10*6/mm3 Final   • Hemoglobin 08/20/2022 16.0 (A) 12.0 - 15.9 g/dL Final   • Hematocrit 08/20/2022 48.9 (A) 34.0 - 46.6 % Final   • MCV 08/20/2022 84.0  79.0 - 97.0 fL Final   • MCH 08/20/2022 27.5  26.6 - 33.0 pg Final   • MCHC 08/20/2022 32.7  31.5 - 35.7 g/dL Final   • RDW 08/20/2022 15.2  12.3 - 15.4 % Final   • RDW-SD 08/20/2022 44.9  37.0 - 54.0 fl Final   • MPV 08/20/2022 11.3  6.0 - 12.0 fL Final   •  Platelets 08/20/2022 166  140 - 450 10*3/mm3 Final   • Neutrophil % 08/20/2022 80.6 (A) 42.7 - 76.0 % Final   • Lymphocyte % 08/20/2022 12.3 (A) 19.6 - 45.3 % Final   • Monocyte % 08/20/2022 5.8  5.0 - 12.0 % Final   • Eosinophil % 08/20/2022 0.4  0.3 - 6.2 % Final   • Basophil % 08/20/2022 0.1  0.0 - 1.5 % Final   • Immature Grans % 08/20/2022 0.8 (A) 0.0 - 0.5 % Final   • Neutrophils, Absolute 08/20/2022 8.41 (A) 1.70 - 7.00 10*3/mm3 Final   • Lymphocytes, Absolute 08/20/2022 1.28  0.70 - 3.10 10*3/mm3 Final   • Monocytes, Absolute 08/20/2022 0.60  0.10 - 0.90 10*3/mm3 Final   • Eosinophils, Absolute 08/20/2022 0.04  0.00 - 0.40 10*3/mm3 Final   • Basophils, Absolute 08/20/2022 0.01  0.00 - 0.20 10*3/mm3 Final   • Immature Grans, Absolute 08/20/2022 0.08 (A) 0.00 - 0.05 10*3/mm3 Final   • nRBC 08/20/2022 0.0  0.0 - 0.2 /100 WBC Final   Admission on 08/16/2022, Discharged on 08/16/2022   Component Date Value Ref Range Status   • Glucose 08/16/2022 107 (A) 65 - 99 mg/dL Final   • BUN 08/16/2022 45 (A) 8 - 23 mg/dL Final   • Creatinine 08/16/2022 1.11 (A) 0.57 - 1.00 mg/dL Final   • Sodium 08/16/2022 134 (A) 136 - 145 mmol/L Final   • Potassium 08/16/2022 4.3  3.5 - 5.2 mmol/L Final    Slight hemolysis detected by analyzer. Results may be affected.   • Chloride 08/16/2022 96 (A) 98 - 107 mmol/L Final   • CO2 08/16/2022 17.8 (A) 22.0 - 29.0 mmol/L Final   • Calcium 08/16/2022 9.9  8.6 - 10.5 mg/dL Final   • Total Protein 08/16/2022 7.5  6.0 - 8.5 g/dL Final   • Albumin 08/16/2022 4.58  3.50 - 5.20 g/dL Final   • ALT (SGPT) 08/16/2022 7  1 - 33 U/L Final   • AST (SGOT) 08/16/2022 21  1 - 32 U/L Final   • Alkaline Phosphatase 08/16/2022 98  39 - 117 U/L Final   • Total Bilirubin 08/16/2022 0.8  0.0 - 1.2 mg/dL Final   • Globulin 08/16/2022 2.9  gm/dL Final   • A/G Ratio 08/16/2022 1.6  g/dL Final   • BUN/Creatinine Ratio 08/16/2022 40.5 (A) 7.0 - 25.0 Final   • Anion Gap 08/16/2022 20.2 (A) 5.0 - 15.0 mmol/L Final   •  eGFR 08/16/2022 54.6 (A) >60.0 mL/min/1.73 Final    National Kidney Foundation and American Society of Nephrology (ASN) Task Force recommended calculation based on the Chronic Kidney Disease Epidemiology Collaboration (CKD-EPI) equation refit without adjustment for race.   • COVID19 08/16/2022 Not Detected  Not Detected - Ref. Range Final   • Influenza A PCR 08/16/2022 Not Detected  Not Detected Final   • Influenza B PCR 08/16/2022 Not Detected  Not Detected Final   • QT Interval 08/16/2022 438  ms Final   • QTC Interval 08/16/2022 455  ms Final   • Troponin T 08/16/2022 <0.010  0.000 - 0.030 ng/mL Final   • proBNP 08/16/2022 1,056.0 (A) 0.0 - 900.0 pg/mL Final   • Color, UA 08/16/2022 Dark Yellow (A) Yellow, Straw Final   • Appearance, UA 08/16/2022 Cloudy (A) Clear Final   • pH, UA 08/16/2022 <=5.0  5.0 - 8.0 Final   • Specific Gravity, UA 08/16/2022 1.025  1.005 - 1.030 Final   • Glucose, UA 08/16/2022 Negative  Negative Final   • Ketones, UA 08/16/2022 15 mg/dL (1+) (A) Negative Final   • Bilirubin, UA 08/16/2022 Moderate (2+) (A) Negative Final   • Blood, UA 08/16/2022 Negative  Negative Final   • Protein, UA 08/16/2022 Trace (A) Negative Final   • Leuk Esterase, UA 08/16/2022 Small (1+) (A) Negative Final   • Nitrite, UA 08/16/2022 Negative  Negative Final   • Urobilinogen, UA 08/16/2022 1.0 E.U./dL  0.2 - 1.0 E.U./dL Final   • Lipase 08/16/2022 184 (A) 13 - 60 U/L Final   • THC, Screen, Urine 08/16/2022 Negative  Negative Final   • Phencyclidine (PCP), Urine 08/16/2022 Negative  Negative Final   • Cocaine Screen, Urine 08/16/2022 Negative  Negative Final   • Methamphetamine, Ur 08/16/2022 Negative  Negative Final   • Opiate Screen 08/16/2022 Negative  Negative Final   • Amphetamine Screen, Urine 08/16/2022 Negative  Negative Final   • Benzodiazepine Screen, Urine 08/16/2022 Negative  Negative Final   • Tricyclic Antidepressants Screen 08/16/2022 Negative  Negative Final   • Methadone Screen, Urine 08/16/2022  Negative  Negative Final   • Barbiturates Screen, Urine 08/16/2022 Negative  Negative Final   • Oxycodone Screen, Urine 08/16/2022 Negative  Negative Final   • Propoxyphene Screen 08/16/2022 Negative  Negative Final   • Buprenorphine, Screen, Urine 08/16/2022 Negative  Negative Final   • Ethanol 08/16/2022 <10  0 - 10 mg/dL Final   • Ethanol % 08/16/2022 <0.010  % Final   • C-Reactive Protein 08/16/2022 1.16 (A) 0.00 - 0.50 mg/dL Final   • TSH 08/16/2022 1.050  0.270 - 4.200 uIU/mL Final   • Free T4 08/16/2022 1.34  0.93 - 1.70 ng/dL Final   • Lactate 08/16/2022 2.9 (A) 0.5 - 2.0 mmol/L Final   • Blood Culture 08/16/2022 No growth at 5 days   Final   • Blood Culture 08/16/2022 No growth at 5 days   Final   • WBC 08/16/2022 13.76 (A) 3.40 - 10.80 10*3/mm3 Final   • RBC 08/16/2022 5.85 (A) 3.77 - 5.28 10*6/mm3 Final   • Hemoglobin 08/16/2022 16.3 (A) 12.0 - 15.9 g/dL Final   • Hematocrit 08/16/2022 50.1 (A) 34.0 - 46.6 % Final   • MCV 08/16/2022 85.6  79.0 - 97.0 fL Final   • MCH 08/16/2022 27.9  26.6 - 33.0 pg Final   • MCHC 08/16/2022 32.5  31.5 - 35.7 g/dL Final   • RDW 08/16/2022 15.0  12.3 - 15.4 % Final   • RDW-SD 08/16/2022 46.2  37.0 - 54.0 fl Final   • MPV 08/16/2022 12.3 (A) 6.0 - 12.0 fL Final   • Platelets 08/16/2022 251  140 - 450 10*3/mm3 Final   • Neutrophil % 08/16/2022 77.5 (A) 42.7 - 76.0 % Final   • Lymphocyte % 08/16/2022 15.8 (A) 19.6 - 45.3 % Final   • Monocyte % 08/16/2022 5.8  5.0 - 12.0 % Final   • Eosinophil % 08/16/2022 0.1 (A) 0.3 - 6.2 % Final   • Basophil % 08/16/2022 0.1  0.0 - 1.5 % Final   • Immature Grans % 08/16/2022 0.7 (A) 0.0 - 0.5 % Final   • Neutrophils, Absolute 08/16/2022 10.64 (A) 1.70 - 7.00 10*3/mm3 Final   • Lymphocytes, Absolute 08/16/2022 2.18  0.70 - 3.10 10*3/mm3 Final   • Monocytes, Absolute 08/16/2022 0.80  0.10 - 0.90 10*3/mm3 Final   • Eosinophils, Absolute 08/16/2022 0.02  0.00 - 0.40 10*3/mm3 Final   • Basophils, Absolute 08/16/2022 0.02  0.00 - 0.20 10*3/mm3  Final   • Immature Grans, Absolute 08/16/2022 0.10 (A) 0.00 - 0.05 10*3/mm3 Final   • nRBC 08/16/2022 0.0  0.0 - 0.2 /100 WBC Final   • Site 08/16/2022 Left Brachial   Final   • Franky's Test 08/16/2022 N/A   Final   • pH, Arterial 08/16/2022 7.469 (A) 7.350 - 7.450 pH units Final    83 Value above reference range   • pCO2, Arterial 08/16/2022 25.7 (A) 35.0 - 45.0 mm Hg Final    84 Value below reference range   • pO2, Arterial 08/16/2022 76.1 (A) 83.0 - 108.0 mm Hg Final    84 Value below reference range   • HCO3, Arterial 08/16/2022 18.6 (A) 20.0 - 26.0 mmol/L Final    84 Value below reference range   • Base Excess, Arterial 08/16/2022 -3.3 (A) 0.0 - 2.0 mmol/L Final   • O2 Saturation, Arterial 08/16/2022 96.0  94.0 - 99.0 % Final   • Hemoglobin, Blood Gas 08/16/2022 15.3  13.5 - 17.5 g/dL Final   • Hematocrit, Blood Gas 08/16/2022 46.8  38.0 - 51.0 % Final   • Oxyhemoglobin 08/16/2022 94.9  94 - 99 % Final   • Methemoglobin 08/16/2022 0.20  0.00 - 3.00 % Final   • Carboxyhemoglobin 08/16/2022 0.9  0 - 5 % Final   • A-a DO2 08/16/2022 37.6  0.0 - 300.0 mmHg Final   • CO2 Content 08/16/2022 19.4 (A) 22 - 33 mmol/L Final   • Temperature 08/16/2022 0.0  C Final   • Barometric Pressure for Blood Gas 08/16/2022 727  mmHg Final   • Modality 08/16/2022 Room Air   Final   • FIO2 08/16/2022 21  % Final   • Ventilator Mode 08/16/2022 NA   Final   • Collected by 08/16/2022 [id number]   Final    Meter: [id number]     :  [id number]   • RBC, UA 08/16/2022 None Seen  None Seen, 0-2 /HPF Final   • WBC, UA 08/16/2022 0-2  None Seen, 0-2 /HPF Final   • Bacteria, UA 08/16/2022 Trace (A) None Seen /HPF Final   • Squamous Epithelial Cells, UA 08/16/2022 3-6 (A) None Seen, 0-2 /HPF Final   • Yeast, UA 08/16/2022 Small/1+ Budding Yeast  None Seen /HPF Final   • Hyaline Casts, UA 08/16/2022 None Seen  None Seen /LPF Final   • Methodology 08/16/2022 Manual Light Microscopy   Final   • Lactate 08/16/2022 1.8  0.5 - 2.0 mmol/L Final    • Troponin T 08/16/2022 <0.010  0.000 - 0.030 ng/mL Final       Assessment & Plan     Visit Diagnoses:    ICD-10-CM ICD-9-CM   1. Nausea and vomiting, unspecified vomiting type  R11.2 787.01   2. Weight loss, abnormal  R63.4 783.21       Plan:  The patient will proceed with the EGD secondary to nausea vomiting and abnormal weight loss.  I will try to get this scheduled next week.  She will follow-up after her procedure.    * Surgery not found *      MEDS ORDERED DURING VISIT:  No orders of the defined types were placed in this encounter.      No follow-ups on file.             This document has been electronically signed by Cecilia Collins MD   August 29, 2022 09:37 EDT        Part of this note may be an electronic transcription/translation of spoken language to printed text using the Dragon Dictation System.

## 2022-08-27 ENCOUNTER — HOSPITAL ENCOUNTER (INPATIENT)
Dept: HOSPITAL 79 - ER1 | Age: 67
LOS: 11 days | DRG: 871 | End: 2022-09-07
Attending: INTERNAL MEDICINE | Admitting: INTERNAL MEDICINE
Payer: MEDICARE

## 2022-08-27 VITALS — BODY MASS INDEX: 24.54 KG/M2 | HEIGHT: 63 IN | WEIGHT: 138.5 LBS

## 2022-08-27 DIAGNOSIS — E83.39: ICD-10-CM

## 2022-08-27 DIAGNOSIS — D69.6: ICD-10-CM

## 2022-08-27 DIAGNOSIS — A41.9: Primary | ICD-10-CM

## 2022-08-27 DIAGNOSIS — J93.83: ICD-10-CM

## 2022-08-27 DIAGNOSIS — E87.4: ICD-10-CM

## 2022-08-27 DIAGNOSIS — I10: ICD-10-CM

## 2022-08-27 DIAGNOSIS — G93.41: ICD-10-CM

## 2022-08-27 DIAGNOSIS — E87.6: ICD-10-CM

## 2022-08-27 DIAGNOSIS — E87.0: ICD-10-CM

## 2022-08-27 DIAGNOSIS — E03.9: ICD-10-CM

## 2022-08-27 DIAGNOSIS — Z99.81: ICD-10-CM

## 2022-08-27 DIAGNOSIS — Z99.11: ICD-10-CM

## 2022-08-27 DIAGNOSIS — N17.9: ICD-10-CM

## 2022-08-27 DIAGNOSIS — J12.82: ICD-10-CM

## 2022-08-27 DIAGNOSIS — E87.5: ICD-10-CM

## 2022-08-27 DIAGNOSIS — Z82.49: ICD-10-CM

## 2022-08-27 DIAGNOSIS — U07.1: ICD-10-CM

## 2022-08-27 DIAGNOSIS — R65.21: ICD-10-CM

## 2022-08-27 DIAGNOSIS — Z82.5: ICD-10-CM

## 2022-08-27 LAB
BUN/CREATININE RATIO: 32 (ref 0–10)
HGB BLD-MCNC: 18.4 GM/DL (ref 12.3–15.3)
RED BLOOD COUNT: 6.77 M/UL (ref 4–5.1)
WHITE BLOOD COUNT: 14.4 K/UL (ref 4.5–11)

## 2022-08-27 PROCEDURE — U0002 COVID-19 LAB TEST NON-CDC: HCPCS

## 2022-08-27 PROCEDURE — 3E03329 INTRODUCTION OF OTHER ANTI-INFECTIVE INTO PERIPHERAL VEIN, PERCUTANEOUS APPROACH: ICD-10-PCS | Performed by: INTERNAL MEDICINE

## 2022-08-27 PROCEDURE — C9113 INJ PANTOPRAZOLE SODIUM, VIA: HCPCS

## 2022-08-27 PROCEDURE — P9045 ALBUMIN (HUMAN), 5%, 250 ML: HCPCS

## 2022-08-28 LAB
BUN/CREATININE RATIO: 36 (ref 0–10)
HGB BLD-MCNC: 16.3 GM/DL (ref 12.3–15.3)
RED BLOOD COUNT: 5.81 M/UL (ref 4–5.1)
WHITE BLOOD COUNT: 9.2 K/UL (ref 4.5–11)

## 2022-08-28 PROCEDURE — 8E0ZXY6 ISOLATION: ICD-10-PCS | Performed by: INTERNAL MEDICINE

## 2022-08-28 PROCEDURE — 3E0333Z INTRODUCTION OF ANTI-INFLAMMATORY INTO PERIPHERAL VEIN, PERCUTANEOUS APPROACH: ICD-10-PCS | Performed by: INTERNAL MEDICINE

## 2022-08-29 LAB
BUN/CREATININE RATIO: 50 (ref 0–10)
HGB BLD-MCNC: 15.8 GM/DL (ref 12.3–15.3)
RED BLOOD COUNT: 5.66 M/UL (ref 4–5.1)
WHITE BLOOD COUNT: 10.2 K/UL (ref 4.5–11)

## 2022-08-29 PROCEDURE — XW033E5 INTRODUCTION OF REMDESIVIR ANTI-INFECTIVE INTO PERIPHERAL VEIN, PERCUTANEOUS APPROACH, NEW TECHNOLOGY GROUP 5: ICD-10-PCS | Performed by: INTERNAL MEDICINE

## 2022-08-29 PROCEDURE — B24BZZZ ULTRASONOGRAPHY OF HEART WITH AORTA: ICD-10-PCS | Performed by: INTERNAL MEDICINE

## 2022-08-30 LAB
BUN/CREATININE RATIO: 55 (ref 0–10)
HGB BLD-MCNC: 15.5 GM/DL (ref 12.3–15.3)
RED BLOOD COUNT: 5.6 M/UL (ref 4–5.1)
WHITE BLOOD COUNT: 12.7 K/UL (ref 4.5–11)

## 2022-08-31 LAB
BUN/CREATININE RATIO: 55 (ref 0–10)
HGB BLD-MCNC: 15.8 GM/DL (ref 12.3–15.3)
RED BLOOD COUNT: 5.71 M/UL (ref 4–5.1)
WHITE BLOOD COUNT: 10 K/UL (ref 4.5–11)

## 2022-08-31 NOTE — NUR
dr. gill made rounds earlier and reported unable to give am medications to
patient d/t she spit out medicine, also reported that patient will be stoic
and no response when spoken to. dr. gill acknowledged

## 2022-09-01 LAB
BUN/CREATININE RATIO: 57 (ref 0–10)
HGB BLD-MCNC: 15.5 GM/DL (ref 12.3–15.3)
RED BLOOD COUNT: 5.64 M/UL (ref 4–5.1)
WHITE BLOOD COUNT: 10.6 K/UL (ref 4.5–11)

## 2022-09-01 PROCEDURE — 4A00X4Z MEASUREMENT OF CENTRAL NERVOUS ELECTRICAL ACTIVITY, EXTERNAL APPROACH: ICD-10-PCS | Performed by: PSYCHIATRY & NEUROLOGY

## 2022-09-01 NOTE — NUR
dr. anthony on the floor and informed of patient's K+ level of 2.9 protocol
in placed. he acknowledged

## 2022-09-01 NOTE — NUR
patient taken her medications with the help of his son over the phone. patient
taken her medications, demonstrated able to swallow with the verbal command.

## 2022-09-01 NOTE — NUR
reported to dr. jairo MARTÍNEZ spoken to me r/t not to give anything
by mouth to patient and to keep her npo and he stated they have called him
too. received new order.

## 2022-09-01 NOTE — NUR
unable to give antibiotics and iv fluids patient does not have iv access and
awaiting for vascular rn to insert access.
awaiting for eeg to finish.

## 2022-09-02 LAB
BUN/CREATININE RATIO: 59 (ref 0–10)
HGB BLD-MCNC: 14.7 GM/DL (ref 12.3–15.3)
RED BLOOD COUNT: 5.43 M/UL (ref 4–5.1)
WHITE BLOOD COUNT: 10.9 K/UL (ref 4.5–11)

## 2022-09-03 LAB
BUN/CREATININE RATIO: 55 (ref 0–10)
BUN/CREATININE RATIO: 57 (ref 0–10)
BUN/CREATININE RATIO: 57 (ref 0–10)
HGB BLD-MCNC: 14.9 GM/DL (ref 12.3–15.3)
RED BLOOD COUNT: 5.42 M/UL (ref 4–5.1)
WHITE BLOOD COUNT: 10.9 K/UL (ref 4.5–11)

## 2022-09-03 NOTE — NUR
MD NOTIFIED OF PATIENT CONDITION. PATIENT LETHARGIC, UNABLE TO SPEAK, ONLY
VOCALIZES INCOHERENTLY WITH PAIN. MD CAME TO FLOOR DISCOUNTINUED FLUIDS DUE TO
SODIUM LEVEL  AND ORDERED FREE WATER 100 CC PER HOUR VIA NG TUBE.

## 2022-09-03 NOTE — NUR
pt responds to pain, no verbal.Md aware. nurse spoke with both  and son
concerning change in patient They sated that the son would be up today,to get
family debit card.informed them i would speek to [facility]
 
Nursre notified House Supervisor of patient condition and that family has been
unable to see due to covid,House supervisor stated that one familt member
could come to visit patient if they used PPE.
 I called  back informed that 1 person could come in the room with
patient,Son came to visit,Patient did try to interact with him

## 2022-09-04 LAB
BUN/CREATININE RATIO: 57 (ref 0–10)
BUN/CREATININE RATIO: 60 (ref 0–10)
BUN/CREATININE RATIO: 62 (ref 0–10)
HGB BLD-MCNC: 13.2 GM/DL (ref 12.3–15.3)
RED BLOOD COUNT: 4.83 M/UL (ref 4–5.1)
WHITE BLOOD COUNT: 7.8 K/UL (ref 4.5–11)

## 2022-09-05 LAB
BUN/CREATININE RATIO: 50 (ref 0–10)
BUN/CREATININE RATIO: 57 (ref 0–10)
BUN/CREATININE RATIO: 59 (ref 0–10)
HGB BLD-MCNC: 13.5 GM/DL (ref 12.3–15.3)
RED BLOOD COUNT: 4.95 M/UL (ref 4–5.1)
WHITE BLOOD COUNT: 7.2 K/UL (ref 4.5–11)

## 2022-09-06 LAB
BUN/CREATININE RATIO: 38 (ref 0–10)
BUN/CREATININE RATIO: 39 (ref 0–10)
BUN/CREATININE RATIO: 44 (ref 0–10)
BUN/CREATININE RATIO: 57 (ref 0–10)
HGB BLD-MCNC: 14.1 GM/DL (ref 12.3–15.3)
HGB BLD-MCNC: 9.9 GM/DL (ref 12.3–15.3)
RED BLOOD COUNT: 3.6 M/UL (ref 4–5.1)
RED BLOOD COUNT: 5.11 M/UL (ref 4–5.1)
WHITE BLOOD COUNT: 10.4 K/UL (ref 4.5–11)
WHITE BLOOD COUNT: 11 K/UL (ref 4.5–11)

## 2022-09-06 PROCEDURE — 0W9B3ZX DRAINAGE OF LEFT PLEURAL CAVITY, PERCUTANEOUS APPROACH, DIAGNOSTIC: ICD-10-PCS

## 2022-09-06 PROCEDURE — B548ZZA ULTRASONOGRAPHY OF SUPERIOR VENA CAVA, GUIDANCE: ICD-10-PCS | Performed by: INTERNAL MEDICINE

## 2022-09-06 PROCEDURE — 0BH17EZ INSERTION OF ENDOTRACHEAL AIRWAY INTO TRACHEA, VIA NATURAL OR ARTIFICIAL OPENING: ICD-10-PCS | Performed by: INTERNAL MEDICINE

## 2022-09-06 PROCEDURE — 4A133B1 MONITORING OF ARTERIAL PRESSURE, PERIPHERAL, PERCUTANEOUS APPROACH: ICD-10-PCS | Performed by: INTERNAL MEDICINE

## 2022-09-06 PROCEDURE — 4A133J1 MONITORING OF ARTERIAL PULSE, PERIPHERAL, PERCUTANEOUS APPROACH: ICD-10-PCS | Performed by: INTERNAL MEDICINE

## 2022-09-06 PROCEDURE — 03HY32Z INSERTION OF MONITORING DEVICE INTO UPPER ARTERY, PERCUTANEOUS APPROACH: ICD-10-PCS | Performed by: INTERNAL MEDICINE

## 2022-09-06 PROCEDURE — 0W9B30Z DRAINAGE OF LEFT PLEURAL CAVITY WITH DRAINAGE DEVICE, PERCUTANEOUS APPROACH: ICD-10-PCS

## 2022-09-06 PROCEDURE — 3E043XZ INTRODUCTION OF VASOPRESSOR INTO CENTRAL VEIN, PERCUTANEOUS APPROACH: ICD-10-PCS | Performed by: INTERNAL MEDICINE

## 2022-09-06 PROCEDURE — 02HV33Z INSERTION OF INFUSION DEVICE INTO SUPERIOR VENA CAVA, PERCUTANEOUS APPROACH: ICD-10-PCS | Performed by: INTERNAL MEDICINE

## 2022-09-06 PROCEDURE — 5A1935Z RESPIRATORY VENTILATION, LESS THAN 24 CONSECUTIVE HOURS: ICD-10-PCS | Performed by: INTERNAL MEDICINE

## 2022-09-06 NOTE — NUR
was called to patient room @ 0925 by ST- noted patient having agonal
breathing. contacted dr. Arora and RT for assistance, Clara Willson my
supervisor was on the floor and I asked for assistance as well.
Dr. Arora happened to be walking towards [employer] station and gave him report
of patient condition and Ms. Willson went to the room, Patient vital signs:
b/p 96/71 heart rate 107 resp 26 pulse ox 91%, pupil dilated.
was instructed to call patient family by dr. Arora r/t code status.
0935 unable to contact family after attempting several times.
0937 able to contact son and dr. arora spoke to him and per md son wanted to
continue patient as full code. patient was intubated, chest x ray obtained,
0956 report to ICU nurse
0958 transffered to ICU by Elizabeth and .

## 2022-09-06 NOTE — NUR
This morning as I was rounding on the unit the ST yelled out from [id number]
and stated that she was having alot of trouble breathing. This was around
0935. Ivy immediately went and looked at the patient and stated that she
did not look good and that she was going to call Dr. Rogers. I gowned up and
went into the patients room at this time she was very tachypinc. RR 32. Her o2
was 95% on 5L NC. /113. At this time Dr. Rogers entered the patients
room and assessed the patient. At this time I told Dr. Rogers that she was
responding to commands but was very short of air and having a difficult time
breathing. I also told Dr. Rogers that the patients pupils were a 4 and non
reactive. Respiratory was at the patients bedside at this time and Dr. Rogers
said to get an ABG. Dr. Rogers told Ivy the primary RN to call the
family. He spoke with the patients son and he stated that he wanted her to
reamin a full code. At this time Dr. Rogers entered the pt room and said that
he believes she needs to be intubated due to her breathing. 0943 her BP was
126/56, HR 56, O2 97%, RR 32. I called the  and made her aware of
the situation and I also called the OR to get assistance intubating the
patient. The  responded to the patients bedside. Two respiratory
therapist were at the bedside, Negrita Haynes, Dr. Rogers, myself, Deepali Olson were at the patients bedside. At 0950 Faith Olson intubated the
patient. She used a 7.5 ET tube and it was placed at 21. The patient responded
well. HR 87, O2 97%. Ivy called report to ICU and the RT and myself
transported the patient to ICU room 2128. The primary RN and the pulmonologist
met us at bedside. The patients HR was 87 and her O2 was 95%. I hooked
the patient up to the bedside monitor. I took the patients belonging with us.
This included some clothing, a pair of slippers, her dentures, and a hair
brush.

## 2022-09-07 LAB
BUN/CREATININE RATIO: 29 (ref 0–10)
HEPARIN INDUCED PLATELET AB: 0.08 OD (ref 0–0.4)

## 2022-09-07 PROCEDURE — 5A12012 PERFORMANCE OF CARDIAC OUTPUT, SINGLE, MANUAL: ICD-10-PCS | Performed by: INTERNAL MEDICINE

## (undated) DEVICE — SCRW LK STRDRV TI 5X44MM STRL
Type: IMPLANTABLE DEVICE | Status: NON-FUNCTIONAL
Removed: 2017-11-18

## (undated) DEVICE — SCRW LK STRDRV TI 5X40M STRL
Type: IMPLANTABLE DEVICE | Status: NON-FUNCTIONAL
Removed: 2017-11-18

## (undated) DEVICE — PK EXTREM LOWR 70

## (undated) DEVICE — UNDERCAST PADDING: Brand: DEROYAL

## (undated) DEVICE — DRP C/ARM W/BAND W/CLIPS 41X74IN

## (undated) DEVICE — Device

## (undated) DEVICE — PROXIMATE RH ROTATING HEAD SKIN STAPLERS (35 WIDE) CONTAINS 35 STAINLESS STEEL STAPLES: Brand: PROXIMATE

## (undated) DEVICE — HOLDER: Brand: DEROYAL

## (undated) DEVICE — GW FOR TROCH NAIL 3.2X400MM

## (undated) DEVICE — GLV SURG TRIUMPH ORTHO W/ALOE PF LTX 8 STRL

## (undated) DEVICE — DRSNG PAD ABD 8X10IN STRL

## (undated) DEVICE — TWIST DRILL FOR COLE RADIOLUCENT                                    DRILL 4.0MM: Brand: RUSSELL-TAYLOR